# Patient Record
Sex: MALE | Race: OTHER | NOT HISPANIC OR LATINO | URBAN - METROPOLITAN AREA
[De-identification: names, ages, dates, MRNs, and addresses within clinical notes are randomized per-mention and may not be internally consistent; named-entity substitution may affect disease eponyms.]

---

## 2022-11-01 ENCOUNTER — EMERGENCY (EMERGENCY)
Facility: HOSPITAL | Age: 87
LOS: 1 days | Discharge: HOME | End: 2022-11-01
Attending: EMERGENCY MEDICINE | Admitting: EMERGENCY MEDICINE
Payer: COMMERCIAL

## 2022-11-01 VITALS
HEART RATE: 78 BPM | WEIGHT: 164.91 LBS | OXYGEN SATURATION: 98 % | TEMPERATURE: 98 F | HEIGHT: 69 IN | SYSTOLIC BLOOD PRESSURE: 202 MMHG | DIASTOLIC BLOOD PRESSURE: 94 MMHG | RESPIRATION RATE: 17 BRPM

## 2022-11-01 VITALS
DIASTOLIC BLOOD PRESSURE: 86 MMHG | HEART RATE: 70 BPM | SYSTOLIC BLOOD PRESSURE: 153 MMHG | OXYGEN SATURATION: 98 % | RESPIRATION RATE: 17 BRPM | TEMPERATURE: 97 F

## 2022-11-01 PROBLEM — Z00.00 ENCOUNTER FOR PREVENTIVE HEALTH EXAMINATION: Status: ACTIVE | Noted: 2022-11-01

## 2022-11-01 PROCEDURE — 93971 EXTREMITY STUDY: CPT

## 2022-11-01 PROCEDURE — 73610 X-RAY EXAM OF ANKLE: CPT | Mod: 26,LT

## 2022-11-01 PROCEDURE — 99284 EMERGENCY DEPT VISIT MOD MDM: CPT

## 2022-11-01 PROCEDURE — 93971 EXTREMITY STUDY: CPT | Mod: 26,LT

## 2022-11-01 PROCEDURE — 99284 EMERGENCY DEPT VISIT MOD MDM: CPT | Mod: 25

## 2022-11-01 PROCEDURE — 73610 X-RAY EXAM OF ANKLE: CPT

## 2022-11-01 RX ORDER — FUROSEMIDE 40 MG
20 TABLET ORAL ONCE
Refills: 0 | Status: COMPLETED | OUTPATIENT
Start: 2022-11-01 | End: 2022-11-01

## 2022-11-01 RX ORDER — METOPROLOL TARTRATE 50 MG
25 TABLET ORAL ONCE
Refills: 0 | Status: COMPLETED | OUTPATIENT
Start: 2022-11-01 | End: 2022-11-01

## 2022-11-01 RX ORDER — ACETAMINOPHEN 500 MG
650 TABLET ORAL ONCE
Refills: 0 | Status: COMPLETED | OUTPATIENT
Start: 2022-11-01 | End: 2022-11-01

## 2022-11-01 RX ADMIN — Medication 25 MILLIGRAM(S): at 22:15

## 2022-11-01 RX ADMIN — Medication 20 MILLIGRAM(S): at 22:15

## 2022-11-01 RX ADMIN — Medication 650 MILLIGRAM(S): at 22:15

## 2022-11-01 NOTE — ED PROVIDER NOTE - PSYCHIATRIC, MLM
Form received from: [employer]    Form requesting following info/need: STD    TAZ needed?: No    Location of form: [employer]'s desk    When completed the route for return: Fax [phone]     Alert and oriented to person, place, time/situation. normal mood and affect. no apparent risk to self or others.

## 2022-11-01 NOTE — ED PROVIDER NOTE - MUSCULOSKELETAL, MLM
LLE: mild nonpitting edema of ankle and prox foot. mild anterior ankle ttp.  no rash. no increased warmth. normal color. 2+ dp. FROM ankle/toes, knee/hip

## 2022-11-01 NOTE — ED ADULT NURSE NOTE - OBJECTIVE STATEMENT
Pt presents to the ED with c/o swelling to left ankle he noticed today. Denies any recent falls or trauma to leg. Denies cp/sob.

## 2022-11-01 NOTE — ED PROVIDER NOTE - OBJECTIVE STATEMENT
pt c/o L lower leg/ankle swelling for 1 days assoc c mild ankle achy pain. no recalled trauma. worse c walking. no fever. no cp/sob.

## 2022-11-01 NOTE — ED PROVIDER NOTE - PATIENT PORTAL LINK FT
You can access the FollowMyHealth Patient Portal offered by [facility] by registering at the following website: [email or web address]. By joining [employer]’s FollowMyHealth portal, you will also be able to view your health information using other applications (apps) compatible with our system.

## 2022-11-01 NOTE — ED PROVIDER NOTE - NSFOLLOWUPINSTRUCTIONS_ED_ALL_ED_FT
- take tylenol as needed for pain  - rest, elevated swollen leg    Follow Up in 1-3 Days with your own doctor or with  [address] [address]  Phone: [phone]        Leg Edema    WHAT YOU NEED TO KNOW:    Leg edema is swelling caused by fluid buildup. Your legs may swell if you sit or stand for long periods of time, are pregnant, or are injured. Swelling may also occur if you have heart failure or circulation problems. This means that your heart does not pump blood through your body as it should.  Lower Leg Edema         DISCHARGE INSTRUCTIONS:    Call your local emergency number (911 in the [place]) for any of the following:   •You cannot walk.      •You have chest pain or trouble breathing that is worse when you lie down.      •You suddenly feel lightheaded and have trouble breathing.      •You have new and sudden chest pain. You may have more pain when you take deep breaths or cough.      •You cough up blood.      Return to the emergency department if:   •You feel faint or confused.      •Your skin turns blue or gray.      •Your leg feels warm, tender, and painful. It may be swollen and red.      Call your doctor if:   •You have a fever or feel more tired than usual.      •The veins in your legs look larger than usual. They may look full or bulging.      •Your legs itch or feel heavy.      •You have red or white areas or sores on your legs. The skin may also appear dimpled or have indentations.      •You are gaining weight.      •You have trouble moving your ankles.      •The swelling does not go away, or other parts of your body swell.      •You have questions or concerns about your condition or care.      Self-care:   •Elevate your legs. Raise your legs above the level of your heart as often as you can. This will help decrease swelling and pain. Prop your legs on pillows or blankets to keep them elevated comfortably.  Elevate Leg           •Wear pressure stockings, if directed. These tight stockings put pressure on your legs to promote blood flow and prevent blood clots. Put them on before you get out of bed. Wear the stockings during the day. Do not wear them while you sleep.  Pressure Stockings            •Stay active. Do not stand or sit for long periods of time. Ask your healthcare provider about the best exercise plan for you.  Walking for Exercise           •Eat healthy foods. Healthy foods include fruits, vegetables, whole-grain breads, low-fat dairy products, beans, lean meats, and fish. Ask if you need to be on a special diet.  Healthy Foods           •Limit sodium (salt). Salt will make your body hold even more fluid. Your healthcare provider will tell you how many milligrams (mg) of salt you can have each day.             Follow up with your doctor as directed: Write down your questions so you remember to ask them during your visits.

## 2022-11-01 NOTE — ED ADULT TRIAGE NOTE - BMI (KG/M2)
Labor Analgesia    Date/Time: 5/22/2021 6:47 PM  Performed by: Malachi Lopez MD  Authorized by: Malachi Lopez MD       General Information and Staff    Start Time:   Anesthesiologist: Malachi Lopez MD  Performed by:   Anesthesiologist  Patient Loca
24.4

## 2022-11-01 NOTE — ED PROVIDER NOTE - CPE EDP HEME LYMPH NORM
"  Contact Dermatitis  Contact dermatitis is a skin rash caused by something that touches the skin and makes it irritated and inflamed. Your skin may be red, swollen, dry, and may be cracked. Blisters may form and ooze. The rash will itch.  Contact dermatitis can form on the face and neck, backs of hands, forearms, genitals, and lower legs.  People can get contact dermatitis from lots of sources. These include:  · Plants such as poison ivy, oak, or sumac  · Chemicals in hair dyes and rinses, soaps, solvents, waxes, fingernail polish, and deodorants   · Jewelry or watchbands made of nickel  Contact dermatitis is not passed from person to person.  Talk with your healthcare provider about what may have caused the rash. A type of allergy testing called "patch testing" may be used to discover what you are allergic to. You will need to avoid the source of your rash in the future to prevent it from coming back.  Treatment is done to relieve itching and prevent the rash from coming back. The rash should go away in a few days to a few weeks.  Home care  Your healthcare provider may prescribe medicine to relieve swelling and itching. Follow all instructions when using these medicines.  General care:  · Avoid anything that heats up your skin, such as hot showers or baths, or direct sunlight. This can make itching worse.  · Apply cold compresses to soothe your sores to help relieve your symptoms. Do this for 30 minutes 3 to 4 times a day. You can make a cold compress by soaking a cloth in cold water. Squeeze out excess water. You can add colloidal oatmeal to the water to help reduce itching. For severe itching in a small area, apply an ice pack wrapped in a thin towel. Do this for 20 minutes 3 to 4 times a day.  · You can also try wet dressings. One way to do this is to wear a wet piece of clothing under a dry one. Wear a damp shirt under a dry shirt if your upper body is affected. This can relieve itching and prevent you from " scratching the affected area.  · You can also help relieve large areas of itching by taking a lukewarm bath with colloidal oatmeal added to the water.  · Use hydrocortisone cream for redness and irritation, unless another medicine was prescribed. You can also use benzocaine anesthetic cream or spray. Calamine lotion can also relieve mild symptoms.  · Use oral diphenhydramine to help reduce itching. You can buy this antihistamine at drug and grocery stores. It can make you sleepy, so use lower doses during the daytime. Or you can use loratadine. This is an antihistamine that will not make you sleepy. Do not use diphenhydramine if you have glaucoma or have trouble urinating due to an enlarged prostate.  · If a plant causes your rash, make sure to wash your skin and the clothes you were wearing when you came into contact with the plant. This is to wash away the plant oils that gave you the rash and prevent more or worse symptoms.  · Stay away from the substance or object that causes your symptoms. If you cant avoid it, wear gloves or some other type of protection.  Follow-up care  Follow up with your healthcare provider, or as advised.  When to seek medical advice  Call your healthcare provider right away if any of these occur:  · Spreading of the rash to other parts of your body  · Severe swelling of your face, eyelids, mouth, throat or tongue  · Trouble urinating due to swelling in the genital area  · Fever of 100.4°F (38°C) or higher  · Redness or swelling that gets worse  · Pain that gets worse  · Foul-smelling fluid leaking from the skin  · Yellow-brown crusts on the open blisters  Date Last Reviewed: 9/1/2016  © 1804-0741 [employer]. [address] [place] [address], [place] [address]. All rights reserved. This information is not intended as a substitute for professional medical care. Always follow your healthcare professional's instructions.        Nonspecific Dermatitis  Dermatitis is a skin rash caused  by something that touches the skin and makes it irritated and inflamed.  Your skin may be red, swollen, dry, and may be cracked. Blisters may form and ooze. The rash will itch.  Dermatitis can form on the face and neck, backs of hands, forearms, genitals, and lower legs. Dermatitis is not passed from person to person.  Talk with your health care provider about what may have caused the rash. Common things that cause skin allergies are metal in jewelry, plants like poison ivy or poison oak, and certain skin care products. You will need to avoid the source of your rash in the future to prevent it from coming back. In some cases, the cause of the dermatitis may not be found.  Treatment is done to relieve itching and prevent the rash from coming back. The rash should go away in a few days to a few weeks.  Home care  The health care provider may prescribe medications to relieve swelling and itching. Follow all instructions when using these medications.  · Avoid anything that heats up your skin, such as hot showers or baths, or direct sunlight. This can make itching worse.  · Stay away from whatever you think caused the rash.  · Bathe in warm, not hot, water. Apply a moisturizing lotion after bathing to prevent dry skin.  · Avoid skin irritants such as wool or silk clothing, grease, oils, harsh soaps, and detergents.  · Apply cold compresses to soothe your sores to help relieve your symptoms. Do this for 30 minutes 3 to 4 times a day. You can make a cold compress by soaking a cloth in cold water. Squeeze out excess water. You can add colloidal oatmeal to the water to help reduce itching. For severe itching in a small area, apply an ice pack wrapped in a thin towel. Do this for 20 minutes 3 to 4 times a day.  · You can also help relieve large areas of itching by taking a lukewarm bath with colloidal oatmeal added to the water.  · Use hydrocortisone cream for redness and irritation, unless another medicine was prescribed.  You can also use benzocaine anesthetic cream or spray.  · Use oral diphenhydramine to help reduce itching. This is an antihistamine you can buy at drug and grocery stores. It can make you sleepy, so use lower doses during the daytime. Or you can use loratadine. This is an antihistamine that will not make you sleepy. Dont use diphenhydramine if you have glaucoma or have trouble urinating because of an enlarged prostate.  · Wash your hands or use an antibacterial gel often to prevent the spread of the rash.  Follow-up care  Follow up with your health care provider. Make an appointment with your health care provider if your symptoms do not get better in the next 1 to 2 weeks.  When to seek medical advice  Call your health care provider right away if any of these occur:  · Spreading of the rash to other parts of your body  · Severe swelling of your face, eyelids, mouth, throat or tongue  · Trouble urinating due to swelling in the genital area  · Fever of 100.4°F (38°C) or higher  · Redness or swelling that gets worse  · Pain that gets worse  · Foul-smelling fluid leaking from the skin  · Yellow-brown crusts on the open blisters  · Joint pain   Date Last Reviewed: 7/23/2014  © 3727-9097 [employer] [address] [place] [address], [place] [address]. All rights reserved. This information is not intended as a substitute for professional medical care. Always follow your healthcare professional's instructions.         - - -

## 2022-11-01 NOTE — ED PROVIDER NOTE - CLINICAL SUMMARY MEDICAL DECISION MAKING FREE TEXT BOX
patient pt c L ankle/foot pain/swelling 1 days. no cp/sob. no fever. check xr r/o bony injury/fx. check duplex r/o DVT. pt on lasix 20mg qd . will give additional dose for swelling and high bp at triage. no s/s hypertensive emergency. will reassess bp.     xr neg. sono shows NO dvt. bp improved. ?arthritis, mild sprain. tylenol for pain . f/u pmd

## 2022-11-02 ENCOUNTER — APPOINTMENT (OUTPATIENT)
Dept: GERIATRICS | Facility: CLINIC | Age: 87
End: 2022-11-02

## 2022-11-02 ENCOUNTER — NON-APPOINTMENT (OUTPATIENT)
Age: 87
End: 2022-11-02

## 2022-11-02 VITALS
DIASTOLIC BLOOD PRESSURE: 79 MMHG | SYSTOLIC BLOOD PRESSURE: 138 MMHG | OXYGEN SATURATION: 99 % | RESPIRATION RATE: 18 BRPM | TEMPERATURE: 98.2 F | HEIGHT: 69 IN | WEIGHT: 164 LBS | BODY MASS INDEX: 24.29 KG/M2 | HEART RATE: 77 BPM

## 2022-11-02 DIAGNOSIS — Z85.46 PERSONAL HISTORY OF MALIGNANT NEOPLASM OF PROSTATE: ICD-10-CM

## 2022-11-02 DIAGNOSIS — G47.9 SLEEP DISORDER, UNSPECIFIED: ICD-10-CM

## 2022-11-02 DIAGNOSIS — Z82.0 FAMILY HISTORY OF EPILEPSY AND OTHER DISEASES OF THE NERVOUS SYSTEM: ICD-10-CM

## 2022-11-02 DIAGNOSIS — R32 UNSPECIFIED URINARY INCONTINENCE: ICD-10-CM

## 2022-11-02 DIAGNOSIS — G25.0 ESSENTIAL TREMOR: ICD-10-CM

## 2022-11-02 DIAGNOSIS — I25.10 ATHEROSCLEROTIC HEART DISEASE OF NATIVE CORONARY ARTERY W/OUT ANGINA PECTORIS: ICD-10-CM

## 2022-11-02 DIAGNOSIS — I83.93 ASYMPTOMATIC VARICOSE VEINS OF BILATERAL LOWER EXTREMITIES: ICD-10-CM

## 2022-11-02 DIAGNOSIS — Z82.49 FAMILY HISTORY OF ISCHEMIC HEART DISEASE AND OTHER DISEASES OF THE CIRCULATORY SYSTEM: ICD-10-CM

## 2022-11-02 DIAGNOSIS — L03.90 CELLULITIS, UNSPECIFIED: ICD-10-CM

## 2022-11-02 DIAGNOSIS — Z95.1 PRESENCE OF AORTOCORONARY BYPASS GRAFT: ICD-10-CM

## 2022-11-02 DIAGNOSIS — M10.9 GOUT, UNSPECIFIED: ICD-10-CM

## 2022-11-02 DIAGNOSIS — Z95.0 PRESENCE OF CARDIAC PACEMAKER: ICD-10-CM

## 2022-11-02 PROCEDURE — 99205 OFFICE O/P NEW HI 60 MIN: CPT

## 2022-11-02 RX ORDER — CLOPIDOGREL BISULFATE 75 MG/1
75 TABLET, FILM COATED ORAL
Qty: 90 | Refills: 0 | Status: ACTIVE | COMMUNITY
Start: 2022-11-02

## 2022-11-02 RX ORDER — FUROSEMIDE 20 MG/1
20 TABLET ORAL
Qty: 90 | Refills: 2 | Status: ACTIVE | COMMUNITY
Start: 2022-11-02

## 2022-11-02 RX ORDER — COLCHICINE 0.6 MG/1
0.6 TABLET ORAL
Qty: 30 | Refills: 1 | Status: ACTIVE | COMMUNITY
Start: 2022-11-02 | End: 1900-01-01

## 2022-11-02 RX ORDER — MULTIVIT-MIN/FOLIC/VIT K/LYCOP 400-300MCG
1000 TABLET ORAL
Qty: 90 | Refills: 1 | Status: ACTIVE | COMMUNITY
Start: 2022-11-02

## 2022-11-02 RX ORDER — PRIMIDONE 50 MG/1
50 TABLET ORAL
Qty: 180 | Refills: 0 | Status: ACTIVE | COMMUNITY
Start: 2022-11-02

## 2022-11-02 RX ORDER — ELECTROLYTES/DEXTROSE
SOLUTION, ORAL ORAL
Qty: 30 | Refills: 6 | Status: ACTIVE | COMMUNITY
Start: 2022-11-02

## 2022-11-02 RX ORDER — CEPHALEXIN 500 MG/1
500 CAPSULE ORAL
Qty: 10 | Refills: 0 | Status: ACTIVE | COMMUNITY
Start: 2022-11-02 | End: 1900-01-01

## 2022-11-02 RX ORDER — DOXAZOSIN 1 MG/1
1 TABLET ORAL
Refills: 0 | Status: ACTIVE | COMMUNITY
Start: 2022-11-02

## 2022-11-02 RX ORDER — ATORVASTATIN CALCIUM 40 MG/1
40 TABLET, FILM COATED ORAL
Qty: 90 | Refills: 3 | Status: ACTIVE | COMMUNITY
Start: 2022-11-02

## 2022-11-08 NOTE — CHART NOTE - NSCHARTNOTEFT_GEN_A_CORE
SW called pt to discuss and assist with follow up care. Pt is a 89 y/o male presented to ED for swelling of legs.  As per [facility], pt has a scheduled primary care appt with Katerina Marie on 11/2/22.
As per [facility], patient has scheduled NPA with Geriatrics Dr Katerina Marc on 11/2/22.

## 2022-11-09 RX ORDER — METOPROLOL TARTRATE 25 MG/1
25 TABLET, FILM COATED ORAL TWICE DAILY
Qty: 60 | Refills: 6 | Status: ACTIVE | COMMUNITY
Start: 2022-11-02 | End: 1900-01-01

## 2022-11-09 RX ORDER — POTASSIUM CHLORIDE 750 MG/1
10 TABLET, FILM COATED, EXTENDED RELEASE ORAL
Qty: 30 | Refills: 4 | Status: ACTIVE | COMMUNITY
Start: 2022-11-02 | End: 1900-01-01

## 2022-11-10 ENCOUNTER — OUTPATIENT (OUTPATIENT)
Dept: OUTPATIENT SERVICES | Facility: HOSPITAL | Age: 87
LOS: 1 days | End: 2022-11-10
Payer: COMMERCIAL

## 2022-11-10 ENCOUNTER — APPOINTMENT (OUTPATIENT)
Dept: CT IMAGING | Facility: CLINIC | Age: 87
End: 2022-11-10

## 2022-11-10 DIAGNOSIS — S85.182A: ICD-10-CM

## 2022-11-10 PROBLEM — I10 ESSENTIAL (PRIMARY) HYPERTENSION: Chronic | Status: ACTIVE | Noted: 2022-11-03

## 2022-11-10 PROBLEM — Z95.0 PRESENCE OF CARDIAC PACEMAKER: Chronic | Status: ACTIVE | Noted: 2022-11-03

## 2022-11-10 PROBLEM — I25.10 ATHEROSCLEROTIC HEART DISEASE OF NATIVE CORONARY ARTERY WITHOUT ANGINA PECTORIS: Chronic | Status: ACTIVE | Noted: 2022-11-03

## 2022-11-10 PROCEDURE — 73700 CT LOWER EXTREMITY W/O DYE: CPT | Mod: 26,LT

## 2022-11-10 PROCEDURE — 73700 CT LOWER EXTREMITY W/O DYE: CPT

## 2022-11-21 DIAGNOSIS — Z23 ENCOUNTER FOR IMMUNIZATION: ICD-10-CM

## 2022-11-21 NOTE — REVIEW OF SYSTEMS
[Lower Ext Edema] : lower extremity edema [Incontinence] : incontinence [As Noted in HPI] : as noted in HPI [Fever] : no fever [Eyesight Problems] : no eyesight problems [Loss Of Hearing] : no hearing loss [Chest Pain] : no chest pain [Palpitations] : no palpitations [Cough] : no cough [Constipation] : no constipation [Dysuria] : no dysuria

## 2022-11-21 NOTE — HISTORY OF PRESENT ILLNESS
[No falls in past year] : Patient reported no falls in the past year [Completely Independent] : Completely independent. [0] : 2) Feeling down, depressed, or hopeless: Not at all (0) [PHQ-2 Negative - No further assessment needed] : PHQ-2 Negative - No further assessment needed [With Patient/Caregiver] : , with patient/caregiver [Designated Healthcare Proxy] : Designated healthcare proxy [Name: ___] : Health Care Proxy's Name: [unfilled]  [Relationship: ___] : Relationship: [unfilled] [FreeTextEntry1] : 89yoM with Essential Tremor, CAD s/p CABG, PPM, HLD, hx of prostate cancer, gout, seen as new visit.\par dtr Katie\par went to ER 11/1/22 with lower ext edema has Doppler that was negative for DVT.\par \par was living in [place], but moved to [place] to be with his daugther\par \par CAD s/p triple bybass CABG\par PPM- \par PCI x 4\par TAVR 2020\par Dr. Joon Becker [facility] [place]-\par \par left lower leg edema on and off for a while\par hx of trauma with leg wound from  that has now healed\par DVT negative with doppler 11/1/22\par now with redness and pain\par very dry skin\par \par CKD\par pHTN\par preDm2\par \par Functional\par no recent falls\par balance is "fairly good"\par walks independently\par slow and unsteady\par \par no numbness\par \par Gout: hasn't had recent attack\par hx of in toes\par colchicine- PRN- needs refill\par \par sleep disturbance for 3 years\par denies nocturia\par melatonin sporadically\par \par hx of prostate cancer s/p prostate surgery\par no adjuvant treatment\par with chronic incontinence\par no associated skin rashes\par \par lung cancer s/p removal of "cyst" [facility]- will need records\par denies SOB or cough\par COPD on imaging upon review of old records, asytmpomatic\par \par Dr. 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

## 2022-11-21 NOTE — REASON FOR VISIT
[Initial Evaluation] : an initial evaluation [Family Member] : family member [FreeTextEntry1] : swollen left leg, establish care

## 2022-11-21 NOTE — PHYSICAL EXAM
[Normal] : no respiratory distress, no accessory muscle use, normal respiratory rhythm and effort, lungs were clear to auscultation bilaterally [Normal S1, S2] : normal S1 and S2 [Heart Rate And Rhythm] : heart rate was normal and rhythm regular [Bowel Sounds] : normal bowel sounds [Abdomen Tenderness] : non-tender [Abdomen Soft] : soft [Cervical Lymph Nodes Enlarged Posterior Bilaterally] : posterior cervical [Supraclavicular Lymph Nodes Enlarged Bilaterally] : supraclavicular [Cervical Lymph Nodes Enlarged Anterior Bilaterally] : anterior cervical, supraclavicular [No Clubbing, Cyanosis] : no clubbing or cyanosis of the fingernails [No Focal Deficits] : no focal deficits [Normal Affect] : the affect was normal [Normal Mood] : the mood was normal [de-identified] : midline scar [de-identified] : edema of left lower leg with redness and tenderness, varicose veins, reduced pedal pulses [de-identified] : slow unsteady gait [de-identified] : intention tremor

## 2022-11-21 NOTE — ASSESSMENT
[FreeTextEntry1] : cellulitis: keflex x 5 days, closely monitor, return if not improving\par daily lotion, monitor for any wounds/skin breaks\par vascular referral -reduced pedal pulses\par doppler negative for dvt 11/1/22\par \par cad/PPM/ cabg: referral to cardiology to establish care as recently moved up from [place]\par pending records from previous cardiologist\par ++ s/p TAVR 12/21/20\par Pafib \par CAD s/p PCI LM, LCx 12/7/2020\par cardiac cath 10/2020: patent stens, LM 90%, LCx 90%, RCA 70-80%\par CABG 1/1990:  LIMA-LAD, SVG-OM, SVG-RCA\par PHTN\par has valve replacement, but he doesn't know details\par -check labwork\par -c/w current medications\par \par gout: stable\par c/w colchicine prn flare\par \par hx of prostate cancer s/p prostatectomy 1998\par with urinary incontinence\par \par lobectomy 6/2016 [facility]\par Essential tremor: stable c/w primidone bid\par \par MOCA 11/2/22: 21/30\par difficulty with delayed recall- but able to recall with prompting\par \par next visit: f/u HCP/ACP,  referral , f/u records from [place] doctors- including immunizations \par \par \par

## 2023-02-08 ENCOUNTER — APPOINTMENT (OUTPATIENT)
Dept: GERIATRICS | Facility: CLINIC | Age: 88
End: 2023-02-08

## 2024-01-01 ENCOUNTER — INPATIENT (INPATIENT)
Facility: HOSPITAL | Age: 89
LOS: 0 days | DRG: 684 | End: 2024-09-25
Attending: FAMILY MEDICINE | Admitting: FAMILY MEDICINE
Payer: MEDICARE

## 2024-01-01 VITALS
TEMPERATURE: 97 F | DIASTOLIC BLOOD PRESSURE: 50 MMHG | WEIGHT: 166.89 LBS | OXYGEN SATURATION: 98 % | HEART RATE: 99 BPM | SYSTOLIC BLOOD PRESSURE: 111 MMHG | RESPIRATION RATE: 22 BRPM

## 2024-01-01 VITALS
HEIGHT: 69 IN | OXYGEN SATURATION: 97 % | DIASTOLIC BLOOD PRESSURE: 46 MMHG | SYSTOLIC BLOOD PRESSURE: 98 MMHG | HEART RATE: 70 BPM | TEMPERATURE: 98 F | WEIGHT: 199.96 LBS | RESPIRATION RATE: 22 BRPM

## 2024-01-01 DIAGNOSIS — N17.9 ACUTE KIDNEY FAILURE, UNSPECIFIED: ICD-10-CM

## 2024-01-01 LAB
ALBUMIN SERPL ELPH-MCNC: 2.5 G/DL — LOW (ref 3.3–5)
ALP SERPL-CCNC: 187 U/L — HIGH (ref 40–120)
ALT FLD-CCNC: 73 U/L — HIGH (ref 10–45)
ANION GAP SERPL CALC-SCNC: 19 MMOL/L — HIGH (ref 5–17)
ANION GAP SERPL CALC-SCNC: 21 MMOL/L — HIGH (ref 5–17)
ANISOCYTOSIS BLD QL: SLIGHT — SIGNIFICANT CHANGE UP
APTT BLD: 36.5 SEC — HIGH (ref 24.5–35.6)
AST SERPL-CCNC: 131 U/L — HIGH (ref 10–40)
BASOPHILS # BLD AUTO: 0 K/UL — SIGNIFICANT CHANGE UP (ref 0–0.2)
BASOPHILS NFR BLD AUTO: 0 % — SIGNIFICANT CHANGE UP (ref 0–2)
BILIRUB SERPL-MCNC: 2.8 MG/DL — HIGH (ref 0.2–1.2)
BUN SERPL-MCNC: 117 MG/DL — HIGH (ref 7–23)
BUN SERPL-MCNC: 130 MG/DL — HIGH (ref 7–23)
BURR CELLS BLD QL SMEAR: PRESENT — SIGNIFICANT CHANGE UP
CALCIUM SERPL-MCNC: 7.4 MG/DL — LOW (ref 8.4–10.5)
CALCIUM SERPL-MCNC: 9 MG/DL — SIGNIFICANT CHANGE UP (ref 8.4–10.5)
CHLORIDE SERPL-SCNC: 102 MMOL/L — SIGNIFICANT CHANGE UP (ref 96–108)
CHLORIDE SERPL-SCNC: 92 MMOL/L — LOW (ref 96–108)
CO2 SERPL-SCNC: 16 MMOL/L — LOW (ref 22–31)
CO2 SERPL-SCNC: 18 MMOL/L — LOW (ref 22–31)
CREAT SERPL-MCNC: 4.41 MG/DL — HIGH (ref 0.5–1.3)
CREAT SERPL-MCNC: 4.94 MG/DL — HIGH (ref 0.5–1.3)
E FAECALIS DNA BLD POS QL NAA+NON-PROBE: SIGNIFICANT CHANGE UP
EGFR: 10 ML/MIN/1.73M2 — LOW
EGFR: 11 ML/MIN/1.73M2 — LOW
EOSINOPHIL # BLD AUTO: 0 K/UL — SIGNIFICANT CHANGE UP (ref 0–0.5)
EOSINOPHIL NFR BLD AUTO: 0 % — SIGNIFICANT CHANGE UP (ref 0–6)
FLUAV AG NPH QL: SIGNIFICANT CHANGE UP
FLUBV AG NPH QL: SIGNIFICANT CHANGE UP
GLUCOSE BLDC GLUCOMTR-MCNC: 189 MG/DL — HIGH (ref 70–99)
GLUCOSE BLDC GLUCOMTR-MCNC: 191 MG/DL — HIGH (ref 70–99)
GLUCOSE SERPL-MCNC: 114 MG/DL — HIGH (ref 70–99)
GLUCOSE SERPL-MCNC: 158 MG/DL — HIGH (ref 70–99)
GRAM STN FLD: ABNORMAL
HCT VFR BLD CALC: 29.2 % — LOW (ref 39–50)
HGB BLD-MCNC: 9.8 G/DL — LOW (ref 13–17)
HYPOCHROMIA BLD QL: SLIGHT — SIGNIFICANT CHANGE UP
INR BLD: 3.31 RATIO — HIGH (ref 0.85–1.16)
LACTATE SERPL-SCNC: 4.3 MMOL/L — CRITICAL HIGH (ref 0.7–2)
LACTATE SERPL-SCNC: 4.5 MMOL/L — CRITICAL HIGH (ref 0.7–2)
LYMPHOCYTES # BLD AUTO: 0.18 K/UL — LOW (ref 1–3.3)
LYMPHOCYTES # BLD AUTO: 1 % — LOW (ref 13–44)
MANUAL SMEAR VERIFICATION: SIGNIFICANT CHANGE UP
MCHC RBC-ENTMCNC: 30.2 PG — SIGNIFICANT CHANGE UP (ref 27–34)
MCHC RBC-ENTMCNC: 33.6 GM/DL — SIGNIFICANT CHANGE UP (ref 32–36)
MCV RBC AUTO: 90.1 FL — SIGNIFICANT CHANGE UP (ref 80–100)
METHOD TYPE: SIGNIFICANT CHANGE UP
MONOCYTES # BLD AUTO: 0.7 K/UL — SIGNIFICANT CHANGE UP (ref 0–0.9)
MONOCYTES NFR BLD AUTO: 4 % — SIGNIFICANT CHANGE UP (ref 2–14)
NEUTROPHILS # BLD AUTO: 16.69 K/UL — HIGH (ref 1.8–7.4)
NEUTROPHILS NFR BLD AUTO: 93 % — HIGH (ref 43–77)
NEUTS BAND # BLD: 2 % — SIGNIFICANT CHANGE UP (ref 0–8)
NRBC # BLD: 0 /100 WBCS — SIGNIFICANT CHANGE UP (ref 0–0)
PLAT MORPH BLD: NORMAL — SIGNIFICANT CHANGE UP
PLATELET # BLD AUTO: 209 K/UL — SIGNIFICANT CHANGE UP (ref 150–400)
POTASSIUM SERPL-MCNC: 4.9 MMOL/L — SIGNIFICANT CHANGE UP (ref 3.5–5.3)
POTASSIUM SERPL-MCNC: 7 MMOL/L — CRITICAL HIGH (ref 3.5–5.3)
POTASSIUM SERPL-SCNC: 4.9 MMOL/L — SIGNIFICANT CHANGE UP (ref 3.5–5.3)
POTASSIUM SERPL-SCNC: 7 MMOL/L — CRITICAL HIGH (ref 3.5–5.3)
PROT SERPL-MCNC: 6.6 G/DL — SIGNIFICANT CHANGE UP (ref 6–8.3)
PROTHROM AB SERPL-ACNC: 38.6 SEC — HIGH (ref 9.9–13.4)
RBC # BLD: 3.24 M/UL — LOW (ref 4.2–5.8)
RBC # FLD: 19.9 % — HIGH (ref 10.3–14.5)
RBC BLD AUTO: ABNORMAL
RSV RNA NPH QL NAA+NON-PROBE: SIGNIFICANT CHANGE UP
SARS-COV-2 RNA SPEC QL NAA+PROBE: SIGNIFICANT CHANGE UP
SODIUM SERPL-SCNC: 131 MMOL/L — LOW (ref 135–145)
SODIUM SERPL-SCNC: 137 MMOL/L — SIGNIFICANT CHANGE UP (ref 135–145)
SPECIMEN SOURCE: SIGNIFICANT CHANGE UP
SPECIMEN SOURCE: SIGNIFICANT CHANGE UP
WBC # BLD: 17.57 K/UL — HIGH (ref 3.8–10.5)
WBC # FLD AUTO: 17.57 K/UL — HIGH (ref 3.8–10.5)

## 2024-01-01 PROCEDURE — 93010 ELECTROCARDIOGRAM REPORT: CPT

## 2024-01-01 PROCEDURE — 99235 HOSP IP/OBS SAME DATE MOD 70: CPT

## 2024-01-01 PROCEDURE — 71250 CT THORAX DX C-: CPT | Mod: 26,MC

## 2024-01-01 PROCEDURE — 74176 CT ABD & PELVIS W/O CONTRAST: CPT | Mod: 26,MC

## 2024-01-01 PROCEDURE — 99291 CRITICAL CARE FIRST HOUR: CPT

## 2024-01-01 PROCEDURE — 93306 TTE W/DOPPLER COMPLETE: CPT | Mod: 26

## 2024-01-01 PROCEDURE — 76705 ECHO EXAM OF ABDOMEN: CPT | Mod: 26

## 2024-01-01 PROCEDURE — 71045 X-RAY EXAM CHEST 1 VIEW: CPT | Mod: 26

## 2024-01-01 PROCEDURE — 99222 1ST HOSP IP/OBS MODERATE 55: CPT

## 2024-01-01 RX ORDER — SODIUM CHLORIDE IRRIG SOLUTION 0.9 %
1000 SOLUTION, IRRIGATION IRRIGATION
Refills: 0 | Status: DISCONTINUED | OUTPATIENT
Start: 2024-01-01 | End: 2024-01-01

## 2024-01-01 RX ORDER — PETROLATUM,WHITE
1 JELLY (GRAM) TOPICAL DAILY
Refills: 0 | Status: DISCONTINUED | OUTPATIENT
Start: 2024-01-01 | End: 2024-01-01

## 2024-01-01 RX ORDER — METOLAZONE 10 MG/1
1 TABLET ORAL
Refills: 0 | DISCHARGE

## 2024-01-01 RX ORDER — MULTI VITAMIN/MINERAL SUPPLEMENT WITH ASCORBIC ACID, NIACIN, PYRIDOXINE, PANTOTHENIC ACID, FOLIC ACID, RIBOFLAVIN, THIAMIN, BIOTIN, COBALAMIN AND ZINC. 60; 20; 12.5; 10; 10; 1.7; 1.5; 1; .3; .006 MG/1; MG/1; MG/1; MG/1; MG/1; MG/1; MG/1; MG/1; MG/1; MG/1
1 TABLET, COATED ORAL DAILY
Refills: 0 | Status: DISCONTINUED | OUTPATIENT
Start: 2024-01-01 | End: 2024-01-01

## 2024-01-01 RX ORDER — AZITHROMYCIN 250 MG/1
500 TABLET, FILM COATED ORAL ONCE
Refills: 0 | Status: COMPLETED | OUTPATIENT
Start: 2024-01-01 | End: 2024-01-01

## 2024-01-01 RX ORDER — ATORVASTATIN CALCIUM 10 MG/1
40 TABLET, FILM COATED ORAL AT BEDTIME
Refills: 0 | Status: DISCONTINUED | OUTPATIENT
Start: 2024-01-01 | End: 2024-01-01

## 2024-01-01 RX ORDER — ALCOHOL ANTISEPTIC PADS
25 PADS, MEDICATED (EA) TOPICAL ONCE
Refills: 0 | Status: DISCONTINUED | OUTPATIENT
Start: 2024-01-01 | End: 2024-01-01

## 2024-01-01 RX ORDER — ALCOHOL ANTISEPTIC PADS
15 PADS, MEDICATED (EA) TOPICAL ONCE
Refills: 0 | Status: DISCONTINUED | OUTPATIENT
Start: 2024-01-01 | End: 2024-01-01

## 2024-01-01 RX ORDER — INSULIN REGULAR, HUMAN 100/ML
7 VIAL (ML) INJECTION ONCE
Refills: 0 | Status: COMPLETED | OUTPATIENT
Start: 2024-01-01 | End: 2024-01-01

## 2024-01-01 RX ORDER — 5-HYDROXYTRYPTOPHAN (5-HTP) 100 MG
3 TABLET,DISINTEGRATING ORAL AT BEDTIME
Refills: 0 | Status: DISCONTINUED | OUTPATIENT
Start: 2024-01-01 | End: 2024-01-01

## 2024-01-01 RX ORDER — LATANOPROST 50 UG/ML
1 SOLUTION OPHTHALMIC AT BEDTIME
Refills: 0 | Status: DISCONTINUED | OUTPATIENT
Start: 2024-01-01 | End: 2024-01-01

## 2024-01-01 RX ORDER — SODIUM CHLORIDE 0.9 % (FLUSH) 0.9 %
2300 SYRINGE (ML) INJECTION ONCE
Refills: 0 | Status: COMPLETED | OUTPATIENT
Start: 2024-01-01 | End: 2024-01-01

## 2024-01-01 RX ORDER — MAG HYDROX/ALUMINUM HYD/SIMETH 200-200-20
30 SUSPENSION, ORAL (FINAL DOSE FORM) ORAL EVERY 4 HOURS
Refills: 0 | Status: DISCONTINUED | OUTPATIENT
Start: 2024-01-01 | End: 2024-01-01

## 2024-01-01 RX ORDER — ACETAMINOPHEN 325 MG
650 TABLET ORAL EVERY 6 HOURS
Refills: 0 | Status: DISCONTINUED | OUTPATIENT
Start: 2024-01-01 | End: 2024-01-01

## 2024-01-01 RX ORDER — GLUCAGON INJECTION, SOLUTION 0.5 MG/.1ML
1 INJECTION, SOLUTION SUBCUTANEOUS ONCE
Refills: 0 | Status: DISCONTINUED | OUTPATIENT
Start: 2024-01-01 | End: 2024-01-01

## 2024-01-01 RX ORDER — ONDANSETRON HCL/PF 4 MG/2 ML
4 VIAL (ML) INJECTION EVERY 8 HOURS
Refills: 0 | Status: DISCONTINUED | OUTPATIENT
Start: 2024-01-01 | End: 2024-01-01

## 2024-01-01 RX ORDER — ALCOHOL ANTISEPTIC PADS
12.5 PADS, MEDICATED (EA) TOPICAL ONCE
Refills: 0 | Status: DISCONTINUED | OUTPATIENT
Start: 2024-01-01 | End: 2024-01-01

## 2024-01-01 RX ORDER — SODIUM CHLORIDE 0.9 % (FLUSH) 0.9 %
500 SYRINGE (ML) INJECTION ONCE
Refills: 0 | Status: COMPLETED | OUTPATIENT
Start: 2024-01-01 | End: 2024-01-01

## 2024-01-01 RX ORDER — INSULIN LISPRO 100/ML
VIAL (ML) SUBCUTANEOUS
Refills: 0 | Status: DISCONTINUED | OUTPATIENT
Start: 2024-01-01 | End: 2024-01-01

## 2024-01-01 RX ORDER — PIPERACILLIN SODIUM AND TAZOBACTAM SODIUM 12; 1.5 G/60ML; G/60ML
3.38 INJECTION, POWDER, LYOPHILIZED, FOR SOLUTION INTRAVENOUS EVERY 12 HOURS
Refills: 0 | Status: DISCONTINUED | OUTPATIENT
Start: 2024-01-01 | End: 2024-01-01

## 2024-01-01 RX ORDER — SPIRONOLACTONE 100 MG/1
1 TABLET, FILM COATED ORAL
Refills: 0 | DISCHARGE

## 2024-01-01 RX ORDER — SODIUM ZIRCONIUM CYCLOSILICATE 10 G/10G
10 POWDER, FOR SUSPENSION ORAL ONCE
Refills: 0 | Status: COMPLETED | OUTPATIENT
Start: 2024-01-01 | End: 2024-01-01

## 2024-01-01 RX ORDER — HALOPERIDOL LACTATE 2 MG/ML
4 CONCENTRATE, ORAL ORAL ONCE
Refills: 0 | Status: COMPLETED | OUTPATIENT
Start: 2024-01-01 | End: 2024-01-01

## 2024-01-01 RX ORDER — METOPROLOL TARTRATE 50 MG
25 TABLET ORAL DAILY
Refills: 0 | Status: DISCONTINUED | OUTPATIENT
Start: 2024-01-01 | End: 2024-01-01

## 2024-01-01 RX ORDER — ALCOHOL ANTISEPTIC PADS
50 PADS, MEDICATED (EA) TOPICAL ONCE
Refills: 0 | Status: COMPLETED | OUTPATIENT
Start: 2024-01-01 | End: 2024-01-01

## 2024-01-01 RX ORDER — INSULIN LISPRO 100/ML
VIAL (ML) SUBCUTANEOUS AT BEDTIME
Refills: 0 | Status: DISCONTINUED | OUTPATIENT
Start: 2024-01-01 | End: 2024-01-01

## 2024-01-01 RX ORDER — DOXAZOSIN 2 MG/1
2 TABLET ORAL DAILY
Refills: 0 | Status: DISCONTINUED | OUTPATIENT
Start: 2024-01-01 | End: 2024-01-01

## 2024-01-01 RX ORDER — SODIUM ZIRCONIUM CYCLOSILICATE 10 G/10G
10 POWDER, FOR SUSPENSION ORAL THREE TIMES A DAY
Refills: 0 | Status: DISCONTINUED | OUTPATIENT
Start: 2024-01-01 | End: 2024-01-01

## 2024-01-01 RX ORDER — APIXABAN 5 MG/1
2.5 TABLET, FILM COATED ORAL
Refills: 0 | Status: DISCONTINUED | OUTPATIENT
Start: 2024-01-01 | End: 2024-01-01

## 2024-01-01 RX ORDER — SODIUM CHLORIDE 0.9 % (FLUSH) 0.9 %
1000 SYRINGE (ML) INJECTION ONCE
Refills: 0 | Status: DISCONTINUED | OUTPATIENT
Start: 2024-01-01 | End: 2024-01-01

## 2024-01-01 RX ORDER — PEDIATRIC MULTIVIT 61/D3/VIT K 1500-800
1 CAPSULE ORAL DAILY
Refills: 0 | Status: DISCONTINUED | OUTPATIENT
Start: 2024-01-01 | End: 2024-01-01

## 2024-01-01 RX ORDER — FERROUS SULFATE 325(65) MG
325 TABLET ORAL DAILY
Refills: 0 | Status: DISCONTINUED | OUTPATIENT
Start: 2024-01-01 | End: 2024-01-01

## 2024-01-01 RX ORDER — CEFTRIAXONE SODIUM 1 G
1000 VIAL (EA) INJECTION ONCE
Refills: 0 | Status: COMPLETED | OUTPATIENT
Start: 2024-01-01 | End: 2024-01-01

## 2024-01-01 RX ADMIN — PIPERACILLIN SODIUM AND TAZOBACTAM SODIUM 25 GRAM(S): 12; 1.5 INJECTION, POWDER, LYOPHILIZED, FOR SOLUTION INTRAVENOUS at 18:19

## 2024-01-01 RX ADMIN — Medication 1000 MILLILITER(S): at 13:16

## 2024-01-01 RX ADMIN — Medication 1000 MILLILITER(S): at 10:14

## 2024-01-01 RX ADMIN — SODIUM ZIRCONIUM CYCLOSILICATE 10 GRAM(S): 10 POWDER, FOR SUSPENSION ORAL at 10:32

## 2024-01-01 RX ADMIN — Medication 2300 MILLILITER(S): at 10:31

## 2024-01-01 RX ADMIN — AZITHROMYCIN 255 MILLIGRAM(S): 250 TABLET, FILM COATED ORAL at 10:30

## 2024-01-01 RX ADMIN — Medication 1: at 18:31

## 2024-01-01 RX ADMIN — Medication 4 MILLIGRAM(S): at 15:36

## 2024-01-01 RX ADMIN — Medication 7 UNIT(S): at 10:31

## 2024-01-01 RX ADMIN — AZITHROMYCIN 500 MILLIGRAM(S): 250 TABLET, FILM COATED ORAL at 11:30

## 2024-01-01 RX ADMIN — Medication 50 MILLILITER(S): at 14:08

## 2024-01-01 RX ADMIN — Medication 2300 MILLILITER(S): at 12:00

## 2024-01-01 RX ADMIN — Medication 500 MILLILITER(S): at 12:00

## 2024-01-01 RX ADMIN — Medication 100 MILLIGRAM(S): at 10:14

## 2024-01-01 RX ADMIN — Medication 1000 MILLIGRAM(S): at 10:45

## 2024-01-01 RX ADMIN — Medication 50 MILLILITER(S): at 10:31

## 2024-01-01 RX ADMIN — Medication 1000 MILLILITER(S): at 09:25

## 2024-06-01 ENCOUNTER — INPATIENT (INPATIENT)
Facility: HOSPITAL | Age: 89
LOS: 5 days | Discharge: OTHER INSTITUTION | DRG: 291 | End: 2024-06-07
Attending: STUDENT IN AN ORGANIZED HEALTH CARE EDUCATION/TRAINING PROGRAM | Admitting: STUDENT IN AN ORGANIZED HEALTH CARE EDUCATION/TRAINING PROGRAM
Payer: MEDICARE

## 2024-06-01 VITALS
OXYGEN SATURATION: 98 % | HEIGHT: 69 IN | WEIGHT: 184.97 LBS | HEART RATE: 80 BPM | SYSTOLIC BLOOD PRESSURE: 149 MMHG | TEMPERATURE: 98 F | DIASTOLIC BLOOD PRESSURE: 79 MMHG | RESPIRATION RATE: 18 BRPM

## 2024-06-01 DIAGNOSIS — I50.23 ACUTE ON CHRONIC SYSTOLIC (CONGESTIVE) HEART FAILURE: ICD-10-CM

## 2024-06-01 DIAGNOSIS — R79.89 OTHER SPECIFIED ABNORMAL FINDINGS OF BLOOD CHEMISTRY: ICD-10-CM

## 2024-06-01 LAB
ALBUMIN SERPL ELPH-MCNC: 3 G/DL — LOW (ref 3.3–5)
ALP SERPL-CCNC: 173 U/L — HIGH (ref 40–120)
ALT FLD-CCNC: 19 U/L — SIGNIFICANT CHANGE UP (ref 10–45)
ANION GAP SERPL CALC-SCNC: 10 MMOL/L — SIGNIFICANT CHANGE UP (ref 5–17)
APTT BLD: 27.1 SEC — SIGNIFICANT CHANGE UP (ref 24.5–35.6)
AST SERPL-CCNC: 21 U/L — SIGNIFICANT CHANGE UP (ref 10–40)
BASOPHILS # BLD AUTO: 0.04 K/UL — SIGNIFICANT CHANGE UP (ref 0–0.2)
BASOPHILS NFR BLD AUTO: 0.7 % — SIGNIFICANT CHANGE UP (ref 0–2)
BILIRUB SERPL-MCNC: 1.2 MG/DL — SIGNIFICANT CHANGE UP (ref 0.2–1.2)
BUN SERPL-MCNC: 42 MG/DL — HIGH (ref 7–23)
CALCIUM SERPL-MCNC: 8.7 MG/DL — SIGNIFICANT CHANGE UP (ref 8.4–10.5)
CHLORIDE SERPL-SCNC: 107 MMOL/L — SIGNIFICANT CHANGE UP (ref 96–108)
CO2 SERPL-SCNC: 23 MMOL/L — SIGNIFICANT CHANGE UP (ref 22–31)
CREAT SERPL-MCNC: 1.69 MG/DL — HIGH (ref 0.5–1.3)
D DIMER BLD IA.RAPID-MCNC: 686 NG/ML DDU — HIGH
EGFR: 36 ML/MIN/1.73M2 — LOW
EOSINOPHIL # BLD AUTO: 0.18 K/UL — SIGNIFICANT CHANGE UP (ref 0–0.5)
EOSINOPHIL NFR BLD AUTO: 2.9 % — SIGNIFICANT CHANGE UP (ref 0–6)
GLUCOSE BLDC GLUCOMTR-MCNC: 191 MG/DL — HIGH (ref 70–99)
GLUCOSE BLDC GLUCOMTR-MCNC: 200 MG/DL — HIGH (ref 70–99)
GLUCOSE SERPL-MCNC: 184 MG/DL — HIGH (ref 70–99)
HCT VFR BLD CALC: 34 % — LOW (ref 39–50)
HGB BLD-MCNC: 11 G/DL — LOW (ref 13–17)
IMM GRANULOCYTES NFR BLD AUTO: 0.2 % — SIGNIFICANT CHANGE UP (ref 0–0.9)
INR BLD: 1.39 RATIO — HIGH (ref 0.85–1.18)
LIDOCAIN IGE QN: 110 U/L — HIGH (ref 16–77)
LYMPHOCYTES # BLD AUTO: 0.63 K/UL — LOW (ref 1–3.3)
LYMPHOCYTES # BLD AUTO: 10.3 % — LOW (ref 13–44)
MCHC RBC-ENTMCNC: 24.6 PG — LOW (ref 27–34)
MCHC RBC-ENTMCNC: 32.4 GM/DL — SIGNIFICANT CHANGE UP (ref 32–36)
MCV RBC AUTO: 76.1 FL — LOW (ref 80–100)
MONOCYTES # BLD AUTO: 0.73 K/UL — SIGNIFICANT CHANGE UP (ref 0–0.9)
MONOCYTES NFR BLD AUTO: 11.9 % — SIGNIFICANT CHANGE UP (ref 2–14)
NEUTROPHILS # BLD AUTO: 4.52 K/UL — SIGNIFICANT CHANGE UP (ref 1.8–7.4)
NEUTROPHILS NFR BLD AUTO: 74 % — SIGNIFICANT CHANGE UP (ref 43–77)
NRBC # BLD: 0 /100 WBCS — SIGNIFICANT CHANGE UP (ref 0–0)
NT-PROBNP SERPL-SCNC: 3408 PG/ML — HIGH (ref 0–300)
PLATELET # BLD AUTO: 147 K/UL — LOW (ref 150–400)
POTASSIUM SERPL-MCNC: 4.4 MMOL/L — SIGNIFICANT CHANGE UP (ref 3.5–5.3)
POTASSIUM SERPL-SCNC: 4.4 MMOL/L — SIGNIFICANT CHANGE UP (ref 3.5–5.3)
PROT SERPL-MCNC: 7.2 G/DL — SIGNIFICANT CHANGE UP (ref 6–8.3)
PROTHROM AB SERPL-ACNC: 15.7 SEC — HIGH (ref 9.5–13)
RAPID RVP RESULT: SIGNIFICANT CHANGE UP
RBC # BLD: 4.47 M/UL — SIGNIFICANT CHANGE UP (ref 4.2–5.8)
RBC # FLD: 16.5 % — HIGH (ref 10.3–14.5)
SARS-COV-2 RNA SPEC QL NAA+PROBE: SIGNIFICANT CHANGE UP
SODIUM SERPL-SCNC: 140 MMOL/L — SIGNIFICANT CHANGE UP (ref 135–145)
TROPONIN I, HIGH SENSITIVITY RESULT: 16.3 NG/L — SIGNIFICANT CHANGE UP
WBC # BLD: 6.11 K/UL — SIGNIFICANT CHANGE UP (ref 3.8–10.5)
WBC # FLD AUTO: 6.11 K/UL — SIGNIFICANT CHANGE UP (ref 3.8–10.5)

## 2024-06-01 PROCEDURE — 99418 PROLNG IP/OBS E/M EA 15 MIN: CPT

## 2024-06-01 PROCEDURE — 99285 EMERGENCY DEPT VISIT HI MDM: CPT

## 2024-06-01 PROCEDURE — G0316 PROLONG INPT EVAL ADD15 M: CPT

## 2024-06-01 PROCEDURE — 71250 CT THORAX DX C-: CPT | Mod: 26

## 2024-06-01 PROCEDURE — 93010 ELECTROCARDIOGRAM REPORT: CPT

## 2024-06-01 PROCEDURE — 71045 X-RAY EXAM CHEST 1 VIEW: CPT | Mod: 26

## 2024-06-01 PROCEDURE — 93970 EXTREMITY STUDY: CPT | Mod: 26

## 2024-06-01 PROCEDURE — 99223 1ST HOSP IP/OBS HIGH 75: CPT | Mod: GC

## 2024-06-01 RX ORDER — SODIUM CHLORIDE 9 MG/ML
1000 INJECTION, SOLUTION INTRAVENOUS
Refills: 0 | Status: DISCONTINUED | OUTPATIENT
Start: 2024-06-01 | End: 2024-06-07

## 2024-06-01 RX ORDER — DEXTROSE 10 % IN WATER 10 %
125 INTRAVENOUS SOLUTION INTRAVENOUS ONCE
Refills: 0 | Status: DISCONTINUED | OUTPATIENT
Start: 2024-06-01 | End: 2024-06-07

## 2024-06-01 RX ORDER — LORATADINE 10 MG/1
10 TABLET ORAL DAILY
Refills: 0 | Status: DISCONTINUED | OUTPATIENT
Start: 2024-06-01 | End: 2024-06-07

## 2024-06-01 RX ORDER — HEPARIN SODIUM 5000 [USP'U]/ML
5000 INJECTION INTRAVENOUS; SUBCUTANEOUS EVERY 8 HOURS
Refills: 0 | Status: DISCONTINUED | OUTPATIENT
Start: 2024-06-01 | End: 2024-06-02

## 2024-06-01 RX ORDER — CEFTRIAXONE 500 MG/1
1000 INJECTION, POWDER, FOR SOLUTION INTRAMUSCULAR; INTRAVENOUS ONCE
Refills: 0 | Status: COMPLETED | OUTPATIENT
Start: 2024-06-01 | End: 2024-06-01

## 2024-06-01 RX ORDER — METOPROLOL TARTRATE 50 MG
50 TABLET ORAL DAILY
Refills: 0 | Status: DISCONTINUED | OUTPATIENT
Start: 2024-06-01 | End: 2024-06-07

## 2024-06-01 RX ORDER — DEXTROSE 50 % IN WATER 50 %
25 SYRINGE (ML) INTRAVENOUS ONCE
Refills: 0 | Status: DISCONTINUED | OUTPATIENT
Start: 2024-06-01 | End: 2024-06-07

## 2024-06-01 RX ORDER — FUROSEMIDE 40 MG
20 TABLET ORAL
Refills: 0 | Status: DISCONTINUED | OUTPATIENT
Start: 2024-06-02 | End: 2024-06-01

## 2024-06-01 RX ORDER — POTASSIUM CHLORIDE 20 MEQ
20 PACKET (EA) ORAL DAILY
Refills: 0 | Status: DISCONTINUED | OUTPATIENT
Start: 2024-06-01 | End: 2024-06-02

## 2024-06-01 RX ORDER — FUROSEMIDE 40 MG
40 TABLET ORAL DAILY
Refills: 0 | Status: DISCONTINUED | OUTPATIENT
Start: 2024-06-01 | End: 2024-06-06

## 2024-06-01 RX ORDER — LANOLIN ALCOHOL/MO/W.PET/CERES
3 CREAM (GRAM) TOPICAL AT BEDTIME
Refills: 0 | Status: DISCONTINUED | OUTPATIENT
Start: 2024-06-01 | End: 2024-06-07

## 2024-06-01 RX ORDER — ATORVASTATIN CALCIUM 80 MG/1
40 TABLET, FILM COATED ORAL AT BEDTIME
Refills: 0 | Status: DISCONTINUED | OUTPATIENT
Start: 2024-06-01 | End: 2024-06-07

## 2024-06-01 RX ORDER — IPRATROPIUM/ALBUTEROL SULFATE 18-103MCG
3 AEROSOL WITH ADAPTER (GRAM) INHALATION EVERY 6 HOURS
Refills: 0 | Status: DISCONTINUED | OUTPATIENT
Start: 2024-06-01 | End: 2024-06-02

## 2024-06-01 RX ORDER — INSULIN LISPRO 100/ML
VIAL (ML) SUBCUTANEOUS
Refills: 0 | Status: DISCONTINUED | OUTPATIENT
Start: 2024-06-01 | End: 2024-06-03

## 2024-06-01 RX ORDER — INSULIN LISPRO 100/ML
VIAL (ML) SUBCUTANEOUS AT BEDTIME
Refills: 0 | Status: DISCONTINUED | OUTPATIENT
Start: 2024-06-01 | End: 2024-06-03

## 2024-06-01 RX ORDER — CEFTRIAXONE 500 MG/1
1000 INJECTION, POWDER, FOR SOLUTION INTRAMUSCULAR; INTRAVENOUS EVERY 24 HOURS
Refills: 0 | Status: DISCONTINUED | OUTPATIENT
Start: 2024-06-02 | End: 2024-06-03

## 2024-06-01 RX ORDER — DOXAZOSIN MESYLATE 4 MG
2 TABLET ORAL DAILY
Refills: 0 | Status: DISCONTINUED | OUTPATIENT
Start: 2024-06-01 | End: 2024-06-07

## 2024-06-01 RX ORDER — LATANOPROST 0.05 MG/ML
1 SOLUTION/ DROPS OPHTHALMIC; TOPICAL AT BEDTIME
Refills: 0 | Status: DISCONTINUED | OUTPATIENT
Start: 2024-06-01 | End: 2024-06-07

## 2024-06-01 RX ORDER — ACETAMINOPHEN 500 MG
650 TABLET ORAL EVERY 6 HOURS
Refills: 0 | Status: DISCONTINUED | OUTPATIENT
Start: 2024-06-01 | End: 2024-06-07

## 2024-06-01 RX ORDER — CLOPIDOGREL BISULFATE 75 MG/1
75 TABLET, FILM COATED ORAL DAILY
Refills: 0 | Status: DISCONTINUED | OUTPATIENT
Start: 2024-06-01 | End: 2024-06-07

## 2024-06-01 RX ORDER — DEXTROSE 50 % IN WATER 50 %
15 SYRINGE (ML) INTRAVENOUS ONCE
Refills: 0 | Status: DISCONTINUED | OUTPATIENT
Start: 2024-06-01 | End: 2024-06-07

## 2024-06-01 RX ORDER — GLUCAGON INJECTION, SOLUTION 0.5 MG/.1ML
1 INJECTION, SOLUTION SUBCUTANEOUS ONCE
Refills: 0 | Status: DISCONTINUED | OUTPATIENT
Start: 2024-06-01 | End: 2024-06-07

## 2024-06-01 RX ORDER — ONDANSETRON 8 MG/1
4 TABLET, FILM COATED ORAL EVERY 8 HOURS
Refills: 0 | Status: DISCONTINUED | OUTPATIENT
Start: 2024-06-01 | End: 2024-06-07

## 2024-06-01 RX ORDER — DEXTROSE 50 % IN WATER 50 %
12.5 SYRINGE (ML) INTRAVENOUS ONCE
Refills: 0 | Status: DISCONTINUED | OUTPATIENT
Start: 2024-06-01 | End: 2024-06-07

## 2024-06-01 RX ORDER — FUROSEMIDE 40 MG
40 TABLET ORAL ONCE
Refills: 0 | Status: COMPLETED | OUTPATIENT
Start: 2024-06-01 | End: 2024-06-01

## 2024-06-01 RX ADMIN — LATANOPROST 1 DROP(S): 0.05 SOLUTION/ DROPS OPHTHALMIC; TOPICAL at 22:26

## 2024-06-01 RX ADMIN — Medication 40 MILLIGRAM(S): at 14:52

## 2024-06-01 RX ADMIN — CEFTRIAXONE 1000 MILLIGRAM(S): 500 INJECTION, POWDER, FOR SOLUTION INTRAMUSCULAR; INTRAVENOUS at 20:00

## 2024-06-01 RX ADMIN — CEFTRIAXONE 100 MILLIGRAM(S): 500 INJECTION, POWDER, FOR SOLUTION INTRAMUSCULAR; INTRAVENOUS at 19:30

## 2024-06-01 RX ADMIN — HEPARIN SODIUM 5000 UNIT(S): 5000 INJECTION INTRAVENOUS; SUBCUTANEOUS at 22:26

## 2024-06-01 RX ADMIN — Medication 3 MILLILITER(S): at 21:43

## 2024-06-01 RX ADMIN — ATORVASTATIN CALCIUM 40 MILLIGRAM(S): 80 TABLET, FILM COATED ORAL at 22:26

## 2024-06-01 RX ADMIN — Medication 100 MILLIGRAM(S): at 21:33

## 2024-06-01 NOTE — H&P ADULT - ASSESSMENT
89-year-old male with PMHx HTN, CAD with pacemaker, PVD, T2DM, essential tremor, hx prostate cancer, presented from [facility] for concern of shortness of breath for the past 6-8 weeks and associated b/l swelling for the past 1.5 months, admited for CHF exacerbation PNA, and r/o PE.    #CHF exacerbation  BNP 3408. Trop x1 neg  EKG sinus with complete heart block, ventricular placed, .  s/p lasix 40mg IV in ED  -telemetry  -repeat trop  -echo, TSH  -lasix 20mg IV BID  -strict I&Os q8, daily weights  -2L H2O restriction, 2g sodium restriction  -cardiology consult for pacemaker interrogation and CHF exacerbation    #PNA  CXR right lower lung opacity  rocephin and doxy  c/w NC at 2L, wean as tolerated  check RVP, procal  -duonebs q6  -claritin qd  -f/u BCx  -UA, UCx, urine legionella Ag, urine strep  PT/OT    #PE  d-dimer 686  LE doppler  VQ scan  wound care for LE     #ALVIN vs CKD  Cr 1.69, GFR 36  renal sono  nephro consult    #T2DM  hold home metformin  A1c ordered ISS    DVT ppx: Heparin  Diet: DASH, consistent carb  Code: Full 89-year-old male with PMHx HTN, CAD with pacemaker, PVD, T2DM, essential tremor, hx prostate cancer, presented from [facility] for concern of shortness of breath for the past 6-8 weeks and associated b/l swelling for the past 1.5 months, admited for CHF exacerbation PNA, and r/o PE.    #CHF exacerbation  BNP 3408. Trop x1 neg  EKG sinus with complete heart block, ventricular placed, .  s/p lasix 40mg IV in ED  -telemetry  -repeat trop  -echo, TSH  -lasix 20mg IV BID  -strict I&Os q8, daily weights  -2L H2O restriction, 2g sodium restriction  -cardiology consult for pacemaker interrogation and CHF exacerbation    #PNA  CXR right lower lung opacity  rocephin and doxy  c/w NC at 2L, wean as tolerated  check RVP, procal  -duonebs q6  -claritin qd  -f/u BCx  -UA, UCx, urine legionella Ag, urine strep  -PT/OT    #PE  d-dimer 686  -LE doppler  -V/Q scan   -hold off heparin drip, patient not tachy or tachypneic   -wound care for LE    #ALVIN vs CKD  -Cr 1.69, GFR 36  -renal sono  -nephro consult    #T2DM  hold home metformin  A1c ordered ISS    DVT ppx: Heparin  Diet: DASH, consistent carb  Code: Full 89-year-old male with PMHx HTN, CAD with pacemaker, PVD, T2DM, essential tremor, hx prostate cancer, presented from [facility] for concern of shortness of breath for the past 6-8 weeks and associated b/l swelling for the past 1.5 months, admited for CHF exacerbation PNA, and r/o PE.    #CHF exacerbation  BNP 3408. Trop x1 neg  EKG sinus with complete heart block, ventricular placed, .  s/p lasix 40mg IV in ED  -telemetry  -repeat trop  -echo, TSH  -lasix 20mg IV BID  -strict I&Os q8, daily weights  -2L H2O restriction, 2g sodium restriction  -cardiology consult for pacemaker interrogation and CHF exacerbation    #PNA  CXR right lower lung opacity  CT chest prelim: Loculated right pleural effusion in the right major and minor fissures. Small posterior bilateral pleural effusions (right greater than left) and mild bibasilar atelectasis.  -f/u final read of CT chest  -rocephin and doxy  -claritin qd  -check RVP, procal  -duonebs q6  -c/w NC at 2L, wean as tolerated  -f/u BCx  -UA, UCx, urine legionella Ag, urine strep ordered  -pulm consult for pulm effusion seen on prelim CT finding  -PT/OT consult    #Elevated d-dimer, r/o PE  d-dimer 686  -LE doppler  -V/Q scan   -hold off heparin drip, patient not tachy or tachypneic   -wound care for LE    #ALVIN vs CKD  -Cr 1.69, GFR 36  -renal sono  -nephro consult    #T2DM  hold home metformin  A1c ordered ISS    DVT ppx: Heparin  Diet: DASH, consistent carb  Code: Full 89-year-old male with PMHx HTN, CAD with pacemaker, PVD, T2DM, essential tremor, hx prostate cancer, presented from [facility] for concern of shortness of breath for the past 6-8 weeks and associated b/l swelling for the past 1.5 months, admited for CHF exacerbation PNA, and r/o PE.    #CHF exacerbation  BNP 3408. Trop x1 neg  EKG sinus with complete heart block, ventricular placed, .  s/p lasix 40mg IV in ED  -telemetry  -repeat trop  -echo, TSH  -lasix 20mg IV BID  -strict I&Os q8, daily weights  -2L H2O restriction, 2g sodium restriction  -cardiology consult for pacemaker interrogation and CHF exacerbation    #PNA  CXR right lower lung opacity  CT chest prelim: Loculated right pleural effusion in the right major and minor fissures. Small posterior bilateral pleural effusions (right greater than left) and mild bibasilar atelectasis.  -f/u final read of CT chest  -rocephin and doxy  -claritin qd  -check RVP, procal  -duonebs q6  -c/w NC at 2L, wean as tolerated  -f/u BCx  -UA, UCx, urine legionella Ag, urine strep ordered  -pulm consult for pulm effusion seen on prelim CT finding  -PT/OT consult    #Elevated d-dimer, r/o PE  d-dimer 686  -LE doppler  -V/Q scan   -hold off heparin drip, patient not tachy or tachypneic   -wound care for LE    #ALVIN vs CKD  -Cr 1.69, GFR 36  -renal sono  -nephro consult    #T2DM  hold home metformin  A1c ordered  ISS  hypoglycemic protocol    DVT ppx: Heparin  Diet: DASH, consistent carb  Code: Full

## 2024-06-01 NOTE — ED PROVIDER NOTE - CARE PLAN
1 Principal Discharge DX:	Acute on chronic systolic congestive heart failure  Secondary Diagnosis:	Bilateral lower leg cellulitis

## 2024-06-01 NOTE — H&P ADULT - NSHPLABSRESULTS_GEN_ALL_CORE
< from: Xray Chest 1 View- PORTABLE-Urgent (06.01.24 @ 13:52) >    FINDINGS:    Limited exam. The patient's right hand overlies the right lower chest.    Sternotomy, CABG and TAVR.    Left-sided pacemaker.    Cardiomegaly.    Likely right lower lung opacity. The left lung is clear. No pneumothorax.    Degenerative changes of the spine. No acute osseous abnormalities.    IMPRESSION:  Limited exam due to patient's right hand overlying the right lower chest.   Likely right lower lung opacity is indeterminate. Rigo    < end of copied text >    < from: US Duplex Venous Lower Ext Complete, Bilateral (06.01.24 @ 19:02) >    IMPRESSION:  No evidence of deep venous thrombosis in either lower extremity.    < end of copied text > < from: Xray Chest 1 View- PORTABLE-Urgent (06.01.24 @ 13:52) >    FINDINGS:    Limited exam. The patient's right hand overlies the right lower chest.    Sternotomy, CABG and TAVR.    Left-sided pacemaker.    Cardiomegaly.    Likely right lower lung opacity. The left lung is clear. No pneumothorax.    Degenerative changes of the spine. No acute osseous abnormalities.    IMPRESSION:  Limited exam due to patient's right hand overlying the right lower chest.   Likely right lower lung opacity is indeterminate.   < from: US Duplex Venous Lower Ext Complete, Bilateral (06.01.24 @ 19:02) >    IMPRESSION:  No evidence of deep venous thrombosis in either lower extremity.    < end of copied text >      CT chest (Prelim):  Loculated right pleural effusion in the right major and minor fissures. Small posterior bilateral pleural effusions (right greater than left) and mild bibasilar atelectasis.

## 2024-06-01 NOTE — H&P ADULT - NSICDXPASTMEDICALHX_GEN_ALL_CORE_FT
PAST MEDICAL HISTORY:  CAD (coronary artery disease)     Diabetes     HTN (hypertension)     Pacemaker

## 2024-06-01 NOTE — H&P ADULT - ATTENDING COMMENTS
#CHF exacerbation  #RLL PNA  #Possible PE  #ALVIN vs CKD?  - CT chest non contrast  - RVP  - Duonebs q6hr  - Ceftriaxone and doxycycline  - Lasix 40IV daily  - LE doppler negative  - Renal sonogram  - TSH  - v/q scan in AM  - hold off heparin drip, patient not tachy or tachypneic   - echocardiogram  - UA/Ucx, Urine strep, urine legionella   - Claritan 10mg daily  - Wound care consult for LE  - PT/OT  - Cards consult - pacemaker interrogation, shortness of breath  - Renal consult  - pulm consult

## 2024-06-01 NOTE — ED ADULT NURSE NOTE - CHIEF COMPLAINT QUOTE
Patient brought in by EMS from [facility] with complaint of SOB for the past month. Patient states to have SOB when walking. Patient also complaint of increased swelling in his legs

## 2024-06-01 NOTE — ED ADULT TRIAGE NOTE - CHIEF COMPLAINT QUOTE
Patient brought in by EMS from [facility] with complaint of SOB for the past month. Patient states to have SOB when walking. Patient also complaint of increased swelling in his legs

## 2024-06-01 NOTE — H&P ADULT - NSHPREVIEWOFSYSTEMS_GEN_ALL_CORE
#CHF exacerbation  BNP 3408. Trop x1 neg  s/p lasix 40mg IV in ED  -telemetry  -repeat trop  -echo, TSH  -strict I&Os q8, daily weights  -2L H2O restriction, 2g sodium restriction  -cardiology consult      #PNA  CXR right lower lung opacity  rocephin  azithro  c/w NC at 2L, wean as tolerated  check RVP, procal, MRSA PCR?  duonebs q6  f/u BCx    #PE  d-dimer 686  VQ scan    #ALVIN vs CKD  Cr 1.69, GFR 36

## 2024-06-01 NOTE — ED PROVIDER NOTE - OBJECTIVE STATEMENT
89-year-old male with shortness of breath for a few weeks, worsening over the past couple of days, sent from  [facility].  Patient reports  dry cough,  denies fever, NVD, dysuria, chest/abdominal/back/neck pain, headache, focal weakness/numbness.  Reports bilateral worsening leg swelling.  Denies any other symptoms.

## 2024-06-01 NOTE — H&P ADULT - NSHPPHYSICALEXAM_GEN_ALL_CORE
· CONSTITUTIONAL: acute on chronic ill appearing, awake, alert, oriented to person, place, time/situation and in no apparent distress.  · ENMT: Airway patent, Nasal mucosa clear. Mouth with normal mucosa. Throat has no vesicles, no oropharyngeal exudates and uvula is midline.  · EYES: Clear bilaterally, pupils equal, round and reactive to light.  · CARDIAC: Normal rate, regular rhythm.  Heart sounds S1, S2.  No murmurs, rubs or gallops.  · RESPIRATORY: Breath sounds clear and equal bilaterally.  · GASTROINTESTINAL: Abdomen soft, non-tender, no guarding.  · MUSCULOSKELETAL: bl lower leg pitting edema, 1+  · NEUROLOGICAL: Alert and oriented, no focal deficits, no motor or sensory deficits.  · SKIN: Skin normal color for race, warm, dry and intact. No evidence of rash.  · PSYCHIATRIC: no apparent risk to self or others. CONSTITUTIONAL: Awake, alert, NAD  ENMT: Airway patent, Nasal mucosa clear. Mouth with normal mucosa.  EYES: Clear bilaterally, pupils equal.  CARDIAC: Normal rate, regular rhythm.  Heart sounds S1, S2.  No murmurs.  RESPIRATORY: crackles at RLL. no respiratory distress  GASTROINTESTINAL: Abdomen soft, non-tender, no guarding.  MUSCULOSKELETAL: bl lower leg pitting edema, dry, abrasion on R thigh  NEUROLOGICAL: Alert and oriented x3, no focal deficits. +b/l hand tremors  SKIN: Skin normal color, warm, dry and intact.  PSYCHIATRIC: no apparent risk to self or others.

## 2024-06-01 NOTE — ED PROVIDER NOTE - CONSTITUTIONAL, MLM
normal... acute on chronic ill appearing, awake, alert, oriented to person, place, time/situation and in no apparent distress.

## 2024-06-01 NOTE — ED PROVIDER NOTE - CLINICAL SUMMARY MEDICAL DECISION MAKING FREE TEXT BOX
89-year-old male with worsening shortness of breath/bilateral lower swelling.  labs reviewed- ALVIN, elevated  proBNP, D-dimer noted.  x-ray shows possible right infiltrate, will cover with Rocephin, culture sent.   Bilateral Doppler ultrasound showed no DVT, unable to get CTA due to ALVIN. Patient remains on 2 L oxygen saturating high 90s. will admit for CHFE  with superimposed PNA, r/o PE

## 2024-06-01 NOTE — H&P ADULT - HISTORY OF PRESENT ILLNESS
89-year-old male with PMHX HTN, CAD with pacemaker? presenting from [facility] for c/o shortness of breath for a few weeks. SOB worsening over the past couple of days, Patient reports  dry cough,  denies fever, NVD, dysuria, chest/abdominal/back/neck pain, headache, focal weakness/numbness.  Reports bilateral worsening leg swelling.  Denies any other symptoms.    In ED, afebrile. HR 70-80, -151/, RR 18-21, SpO2 97-98% on 2L NC. Labs remarkable for Hg 11.0, MCV 76.1, d-dimer 686, Cr 1.69, GFR 36, BNP 3408. Trop x1 neg. US LE neg for DVT. CXR  with indeterminate right lower lung opacity. Given rocephin x1, furosemide 40mg, BCx 89-year-old male with PMHX HTN, CAD with pacemaker, PVD, T2DM, essential tremor, hx prostate cancer, presenting from [facility] for concern of shortness of breath for the past 6-8 weeks. Associated b/l swelling for the past 1.5 months. Pt also states dry cough x2.5 weeks likely 2/2 seasonal allergies. Pt denies acute worsening of SOB and is unsure why nursing home sent him here today. Cough improves with lozenges.  Denies fever, N/V, diarrhea, chest pain, abdominal pain, headache.     In ED, afebrile. HR 70-80, -151/, RR 18-21, SpO2 97-98% on 2L NC. Labs remarkable for Hg 11.0, MCV 76.1, d-dimer 686, Cr 1.69, GFR 36, BNP 3408. Trop x1 neg. US LE neg for DVT. EKG sinus with complete heart block, ventricular placed, . CXR  with indeterminate right lower lung opacity. Given rocephin x1, furosemide 40mg, BCx 89-year-old male with PMHX HTN, CAD with pacemaker, PVD, T2DM, essential tremor, hx prostate cancer, presenting from [facility] for concern of shortness of breath for the past 6-8 weeks. Associated b/l swelling for the past 1.5 months. Pt also states dry cough x2.5 weeks likely 2/2 seasonal allergies. Pt denies acute worsening of SOB and is unsure why nursing home sent him here today. Cough improves with lozenges.  Denies fever, N/V, diarrhea, chest pain, abdominal pain, headache.     In ED, afebrile. HR 70-80, -151/, RR 18-21, SpO2 97-98% on 2L NC. Labs remarkable for Hg 11.0, MCV 76.1, d-dimer 686, Cr 1.69, GFR 36, BNP 3408. Trop x1 neg. US LE neg for DVT. EKG sinus with complete heart block, ventricular placed, . CXR  with indeterminate right lower lung opacity. Given rocephin x1, furosemide 40mg. 89-year-old male with PMHx HTN, CAD with pacemaker, PVD, T2DM, essential tremor, hx prostate cancer, presenting from [facility] for concern of shortness of breath for the past 6-8 weeks. Associated b/l swelling for the past 1.5 months. Pt also states dry cough x2.5 weeks likely 2/2 seasonal allergies. Pt denies acute worsening of SOB and is unsure why the nursing home sent him here today. Cough improves with lozenges.  Denies fever, N/V, diarrhea, chest pain, abdominal pain, headache.     In ED, afebrile. HR 70-80, -151/, RR 18-21, SpO2 97-98% on 2L NC. Labs remarkable for Hg 11.0, MCV 76.1, d-dimer 686, Cr 1.69, GFR 36, BNP 3408. Trop x1 neg. US LE neg for DVT. EKG sinus with complete heart block, ventricular placed, . CXR  with indeterminate right lower lung opacity. Given rocephin x1, furosemide 40mg.

## 2024-06-01 NOTE — PATIENT PROFILE ADULT - FALL HARM RISK - HARM RISK INTERVENTIONS

## 2024-06-02 LAB
-  COAGULASE NEGATIVE STAPHYLOCOCCUS: SIGNIFICANT CHANGE UP
A1C WITH ESTIMATED AVERAGE GLUCOSE RESULT: 8.2 % — HIGH (ref 4–5.6)
ALBUMIN SERPL ELPH-MCNC: 2.9 G/DL — LOW (ref 3.3–5)
ALP SERPL-CCNC: 169 U/L — HIGH (ref 40–120)
ALT FLD-CCNC: 22 U/L — SIGNIFICANT CHANGE UP (ref 10–45)
ANION GAP SERPL CALC-SCNC: 9 MMOL/L — SIGNIFICANT CHANGE UP (ref 5–17)
AST SERPL-CCNC: 21 U/L — SIGNIFICANT CHANGE UP (ref 10–40)
BILIRUB SERPL-MCNC: 1.1 MG/DL — SIGNIFICANT CHANGE UP (ref 0.2–1.2)
BUN SERPL-MCNC: 40 MG/DL — HIGH (ref 7–23)
CALCIUM SERPL-MCNC: 8.8 MG/DL — SIGNIFICANT CHANGE UP (ref 8.4–10.5)
CHLORIDE SERPL-SCNC: 103 MMOL/L — SIGNIFICANT CHANGE UP (ref 96–108)
CO2 SERPL-SCNC: 27 MMOL/L — SIGNIFICANT CHANGE UP (ref 22–31)
CREAT SERPL-MCNC: 1.57 MG/DL — HIGH (ref 0.5–1.3)
EGFR: 40 ML/MIN/1.73M2 — LOW
ESTIMATED AVERAGE GLUCOSE: 189 MG/DL — HIGH (ref 68–114)
GLUCOSE BLDC GLUCOMTR-MCNC: 147 MG/DL — HIGH (ref 70–99)
GLUCOSE BLDC GLUCOMTR-MCNC: 163 MG/DL — HIGH (ref 70–99)
GLUCOSE BLDC GLUCOMTR-MCNC: 167 MG/DL — HIGH (ref 70–99)
GLUCOSE BLDC GLUCOMTR-MCNC: 167 MG/DL — HIGH (ref 70–99)
GLUCOSE BLDC GLUCOMTR-MCNC: 190 MG/DL — HIGH (ref 70–99)
GLUCOSE SERPL-MCNC: 159 MG/DL — HIGH (ref 70–99)
GRAM STN FLD: ABNORMAL
HCT VFR BLD CALC: 33.8 % — LOW (ref 39–50)
HGB BLD-MCNC: 10.9 G/DL — LOW (ref 13–17)
MAGNESIUM SERPL-MCNC: 1.8 MG/DL — SIGNIFICANT CHANGE UP (ref 1.6–2.6)
MCHC RBC-ENTMCNC: 24.4 PG — LOW (ref 27–34)
MCHC RBC-ENTMCNC: 32.2 GM/DL — SIGNIFICANT CHANGE UP (ref 32–36)
MCV RBC AUTO: 75.6 FL — LOW (ref 80–100)
METHOD TYPE: SIGNIFICANT CHANGE UP
NRBC # BLD: 0 /100 WBCS — SIGNIFICANT CHANGE UP (ref 0–0)
NT-PROBNP SERPL-SCNC: 3280 PG/ML — HIGH (ref 0–300)
PHOSPHATE SERPL-MCNC: 3.4 MG/DL — SIGNIFICANT CHANGE UP (ref 2.5–4.5)
PLATELET # BLD AUTO: 148 K/UL — LOW (ref 150–400)
POTASSIUM SERPL-MCNC: 4 MMOL/L — SIGNIFICANT CHANGE UP (ref 3.5–5.3)
POTASSIUM SERPL-SCNC: 4 MMOL/L — SIGNIFICANT CHANGE UP (ref 3.5–5.3)
PROCALCITONIN SERPL-MCNC: 0.14 NG/ML — HIGH
PROT SERPL-MCNC: 7.2 G/DL — SIGNIFICANT CHANGE UP (ref 6–8.3)
RAPID RVP RESULT: SIGNIFICANT CHANGE UP
RBC # BLD: 4.47 M/UL — SIGNIFICANT CHANGE UP (ref 4.2–5.8)
RBC # FLD: 16.8 % — HIGH (ref 10.3–14.5)
S PNEUM AG UR QL: NEGATIVE — SIGNIFICANT CHANGE UP
SARS-COV-2 RNA SPEC QL NAA+PROBE: SIGNIFICANT CHANGE UP
SODIUM SERPL-SCNC: 139 MMOL/L — SIGNIFICANT CHANGE UP (ref 135–145)
SPECIMEN SOURCE: SIGNIFICANT CHANGE UP
TROPONIN I, HIGH SENSITIVITY RESULT: 20.8 NG/L — SIGNIFICANT CHANGE UP
TSH SERPL-MCNC: 3.67 UIU/ML — SIGNIFICANT CHANGE UP (ref 0.36–3.74)
WBC # BLD: 6.89 K/UL — SIGNIFICANT CHANGE UP (ref 3.8–10.5)
WBC # FLD AUTO: 6.89 K/UL — SIGNIFICANT CHANGE UP (ref 3.8–10.5)

## 2024-06-02 PROCEDURE — 99233 SBSQ HOSP IP/OBS HIGH 50: CPT

## 2024-06-02 PROCEDURE — 99222 1ST HOSP IP/OBS MODERATE 55: CPT

## 2024-06-02 PROCEDURE — 76775 US EXAM ABDO BACK WALL LIM: CPT | Mod: 26

## 2024-06-02 RX ORDER — RIVAROXABAN 15 MG-20MG
15 KIT ORAL
Refills: 0 | Status: DISCONTINUED | OUTPATIENT
Start: 2024-06-02 | End: 2024-06-07

## 2024-06-02 RX ORDER — INSULIN LISPRO 100/ML
VIAL (ML) SUBCUTANEOUS EVERY 6 HOURS
Refills: 0 | Status: DISCONTINUED | OUTPATIENT
Start: 2024-06-02 | End: 2024-06-02

## 2024-06-02 RX ORDER — BENZOCAINE AND MENTHOL 5; 1 G/100ML; G/100ML
1 LIQUID ORAL ONCE
Refills: 0 | Status: COMPLETED | OUTPATIENT
Start: 2024-06-02 | End: 2024-06-02

## 2024-06-02 RX ADMIN — RIVAROXABAN 15 MILLIGRAM(S): KIT at 19:16

## 2024-06-02 RX ADMIN — CEFTRIAXONE 100 MILLIGRAM(S): 500 INJECTION, POWDER, FOR SOLUTION INTRAMUSCULAR; INTRAVENOUS at 18:13

## 2024-06-02 RX ADMIN — Medication 50 MILLIGRAM(S): at 05:36

## 2024-06-02 RX ADMIN — LATANOPROST 1 DROP(S): 0.05 SOLUTION/ DROPS OPHTHALMIC; TOPICAL at 21:27

## 2024-06-02 RX ADMIN — LORATADINE 10 MILLIGRAM(S): 10 TABLET ORAL at 12:08

## 2024-06-02 RX ADMIN — ATORVASTATIN CALCIUM 40 MILLIGRAM(S): 80 TABLET, FILM COATED ORAL at 21:27

## 2024-06-02 RX ADMIN — Medication 1 TABLET(S): at 12:08

## 2024-06-02 RX ADMIN — Medication 100 MILLIGRAM(S): at 05:36

## 2024-06-02 RX ADMIN — CLOPIDOGREL BISULFATE 75 MILLIGRAM(S): 75 TABLET, FILM COATED ORAL at 12:08

## 2024-06-02 RX ADMIN — Medication 0: at 08:21

## 2024-06-02 RX ADMIN — Medication 1: at 12:09

## 2024-06-02 RX ADMIN — Medication 100 MILLIGRAM(S): at 18:13

## 2024-06-02 RX ADMIN — HEPARIN SODIUM 5000 UNIT(S): 5000 INJECTION INTRAVENOUS; SUBCUTANEOUS at 05:36

## 2024-06-02 RX ADMIN — Medication 40 MILLIGRAM(S): at 05:36

## 2024-06-02 RX ADMIN — Medication 1: at 17:11

## 2024-06-02 RX ADMIN — Medication 2 MILLIGRAM(S): at 05:36

## 2024-06-02 NOTE — PHYSICAL THERAPY INITIAL EVALUATION ADULT - PERTINENT HX OF CURRENT PROBLEM, REHAB EVAL
89-year-old male with PMHx HTN, CAD with pacemaker, PVD, T2DM, essential tremor, hx prostate cancer, presented from [facility] for concern of shortness of breath for the past 6-8 weeks and associated b/l swelling for the past 1.5 months, admited for CHF exacerbation PNA, and r/o PE.    #CHF exacerbation  BNP 3408. Trop x1 neg  EKG sinus with complete heart block, ventricular placed, .  s/p lasix 40mg IV in ED

## 2024-06-02 NOTE — CHART NOTE - NSCHARTNOTEFT_GEN_A_CORE
Notified by RN, that Pt had a visitor today, who tested positive for COVID. Pt c/o cough tonight. Will place in isolation due to exposure, Pt will need to be transferred from surgical floor. Ordered RVP, and lozenge. Will sign out to day team. Notified by RN, that Pt had a visitor today, who tested positive for COVID. Pt c/o cough tonight. Ordered RVP, and lozenge. Pt will need to be transferred from surgical floor as per mgt. RVP negative. Will sign out to day team. Notified by RN, that Pt had a visitor today, who tested positive for COVID. Pt c/o cough tonight. Ordered RVP, and lozenge. Pt will need to be transferred from surgical floor as per mgt due to positive blood culture with gram +cocci in pairs. RVP negative. Will sign out to day team.

## 2024-06-02 NOTE — PHYSICAL THERAPY INITIAL EVALUATION ADULT - ORIENTATION, REHAB EVAL
See ultrasound report for details of ultrasound evaluation and recommendations.     
Normal ultrasound
oriented to person, place, time and situation

## 2024-06-02 NOTE — CONSULT NOTE ADULT - SUBJECTIVE AND OBJECTIVE BOX
PULMONARY CONSULT  Location of Patient :  2SUR [id number] ([facility] 2SUR)        Initial HPI on admission:  HPI:  89 year old male with history of lung cancer (unsure weather right or left) s/p ? resection denies chemo and unsure of radiation, non smoker, denies h/o COPD/Asthma, HTN, CAD with pacemaker, PVD, T2DM, essential tremor, hx prostate cancer, presenting from [facility] for concern of shortness of breath for the past 6-8 weeks. Associated b/l swelling for the past 1.5 months. Pt also states dry cough years worsening over past few weeeks likely 2/2 seasonal allergies (based on dtrs bedside, as occurs this time of year) . Pt denies acute worsening of SOB and is unsure why the nursing home sent him here today. Cough improves with lozenges.  Denies fever, N/V, diarrhea, chest pain, abdominal pain, headache.     In ED, afebrile. HR 70-80, -151/, RR 18-21, SpO2 97-98% on 2L NC. Labs remarkable for Hg 11.0, MCV 76.1, d-dimer 686, Cr 1.69, GFR 36, BNP 3408. Trop x1 neg. US LE neg for DVT. EKG sinus with complete heart block, ventricular placed, . CXR  with indeterminate right lower lung opacity. Given rocephin x1, furosemide 40mg. (01 Jun 2024 19:25)    Pulmonary consult called today to evaluate sob  patient seen and examined in chair on room air  states sob slightly better  less sob, cough, secretions    PAST MEDICAL & SURGICAL HISTORY:  Pacemaker  CAD (coronary artery disease)  HTN (hypertension)  Diabetes        Allergies  No Known Allergies      FAMILY HISTORY: Denies    Social history     Smoking: denies     Drinking:     Drug use:    Review of Systems: as stated above  patient states is forgetful and poor historian     CONSTITUTIONAL: No fever, No chills, +fatigue  EYES: No eye pain, No visual disturbances, No discharge  ENMT:  No difficulty hearing, No tinnitus, No vertigo; No sinus or throat pain  NECK: No pain, No stiffness  RESPIRATORY: + Cough, +SO/DOEB, No Secretions  CARDIOVASCULAR: No chest pain, No palpitations, No dizziness, or +leg swelling  GASTROINTESTINAL: No abdominal or epigastric pain. No nausea, No vomiting, No hematemesis; No diarrhea, No constipation. No melena, No hematochezia.  GENITOURINARY: No dysuria, No frequency, No hematuria, No incontinence  NEUROLOGICAL: No headaches, No memory loss, No loss of strength, No numbness, No tremors  SKIN: No itching, No burning, No rashes, No lesions   MUSCULOSKELETAL: No joint pain or swelling; No muscle, back, No extremity pain  PSYCHIATRIC: No depression, No anxiety, No mood swings, No difficulty sleeping      Medications:  MEDICATIONS  (STANDING):  albuterol/ipratropium for Nebulization 3 milliLiter(s) Nebulizer every 6 hours  atorvastatin 40 milliGRAM(s) Oral at bedtime  cefTRIAXone   IVPB 1000 milliGRAM(s) IV Intermittent every 24 hours  clopidogrel Tablet 75 milliGRAM(s) Oral daily  dextrose 10% Bolus 125 milliLiter(s) IV Bolus once  dextrose 5%. 1000 milliLiter(s) (100 mL/Hr) IV Continuous <Continuous>  dextrose 5%. 1000 milliLiter(s) (50 mL/Hr) IV Continuous <Continuous>  dextrose 50% Injectable 25 Gram(s) IV Push once  dextrose 50% Injectable 12.5 Gram(s) IV Push once  doxazosin 2 milliGRAM(s) Oral daily  doxycycline IVPB      doxycycline IVPB 100 milliGRAM(s) IV Intermittent every 12 hours  furosemide   Injectable 40 milliGRAM(s) IV Push daily  glucagon  Injectable 1 milliGRAM(s) IntraMuscular once  heparin   Injectable 5000 Unit(s) SubCutaneous every 8 hours  insulin lispro (ADMELOG) corrective regimen sliding scale   SubCutaneous three times a day before meals  insulin lispro (ADMELOG) corrective regimen sliding scale   SubCutaneous at bedtime  latanoprost 0.005% Ophthalmic Solution 1 Drop(s) Both EYES at bedtime  loratadine 10 milliGRAM(s) Oral daily  metoprolol succinate ER 50 milliGRAM(s) Oral daily  multivitamin 1 Tablet(s) Oral daily    MEDICATIONS  (PRN):  acetaminophen     Tablet .. 650 milliGRAM(s) Oral every 6 hours PRN Temp greater or equal to 38C (100.4F), Mild Pain (1 - 3)  aluminum hydroxide/magnesium hydroxide/simethicone Suspension 30 milliLiter(s) Oral every 4 hours PRN Dyspepsia  dextrose Oral Gel 15 Gram(s) Oral once PRN Blood Glucose LESS THAN 70 milliGRAM(s)/deciliter  melatonin 3 milliGRAM(s) Oral at bedtime PRN Insomnia  ondansetron Injectable 4 milliGRAM(s) IV Push every 8 hours PRN Nausea and/or Vomiting      Antibiotics History  cefTRIAXone   IVPB 1000 milliGRAM(s) IV Intermittent once, 06-01-24 @ 18:47, Stop order after: 1 Doses  cefTRIAXone   IVPB 1000 milliGRAM(s) IV Intermittent every 24 hours, 06-02-24 @ 18:00, Stop order after: 5 Days  doxycycline IVPB    , 06-01-24 @ 21:33  doxycycline IVPB 100 milliGRAM(s) IV Intermittent once, 06-01-24 @ 20:24  doxycycline IVPB 100 milliGRAM(s) IV Intermittent every 12 hours, 06-02-24 @ 06:00      Heme Medications   clopidogrel Tablet 75 milliGRAM(s) Oral daily, 06-01-24 @ 21:04  heparin   Injectable 5000 Unit(s) SubCutaneous every 8 hours, 06-01-24 @ 20:27      GI Medications  aluminum hydroxide/magnesium hydroxide/simethicone Suspension 30 milliLiter(s) Oral every 4 hours, 06-01-24 @ 20:30, Routine PRN        Home Medications:  Last Order Reconciliation Date: 06-01-24 @ 21:05 (Admission Reconciliation)  atorvastatin 80 mg oral tablet: 0.5 tab(s) orally once a day (at bedtime) (06-01-24 @ 20:55)  doxazosin 2 mg oral tablet: 1 tab(s) orally once a day (06-01-24 @ 20:54)  furosemide 20 mg oral tablet: 2 tab(s) orally once a day (06-01-24 @ 20:54)  latanoprost 0.005% ophthalmic solution: 1 drop(s) in each eye once a day (at bedtime) (06-01-24 @ 20:55)  metFORMIN 500 mg oral tablet: 1 tab(s) orally once a day (06-01-24 @ 20:55)  metoprolol succinate 100 mg oral tablet, extended release: 0.5 tab(s) orally once a day (06-01-24 @ 20:55)  Multiple Vitamins oral tablet: 1 tab(s) orally once a day (06-01-24 @ 21:05)  Plavix 75 mg oral tablet: 1 tab(s) orally once a day (06-01-24 @ 20:54)  potassium chloride 10 mEq oral capsule, extended release: 2 cap(s) orally once a day (06-01-24 @ 20:55)  PreserVision AREDS 2 oral capsule: 1 cap(s) orally once a day (06-01-24 @ 20:55)      LABS:                        10.9   6.89  )-----------( 148      ( 02 Jun 2024 06:00 )             33.8     06-02    139  |  103  |  40<H>  ----------------------------<  159<H>  4.0   |  27  |  1.57<H>    Ca    8.8      02 Jun 2024 06:00  Phos  3.4     06-02  Mg     1.8     06-02    TPro  7.2  /  Alb  2.9<L>  /  TBili  1.1  /  DBili  x   /  AST  21  /  ALT  22  /  AlkPhos  169<H>  06-02    HIT ab -- 06-01 @ 13:50  HIT ab EIA --  D Dimer -686  Procalcitonin: 0.14 ng/mL (06-02-24 @ 06:00)         RADIOLOGY  CXR:      CT:  < from: CT Chest No Cont (06.01.24 @ 22:58) >    ACC: [id number] EXAM:  CT CHEST   ORDERED BY:  MANDO LUX     PROCEDURE DATE:  06/01/2024          INTERPRETATION:  EXAMINATION: CT CHEST    CLINICAL INDICATION: Lung opacity.    TECHNIQUE: Noncontrast CT of the chest was obtained.    COMPARISON: None.    FINDINGS:    AIRWAYS AND LUNGS: The central tracheobronchial tree is patent.  Ill-defined patchy and clustered bilateral lung opacities. Left lower lobe subpleural 4 mm nodule versus intrapulmonary lymph node. Right lung   postsurgical changes.    MEDIASTINUM AND PLEURA: There are no enlarged mediastinal, hilar or axillary lymph nodes. The visualized portion of the thyroid gland is heterogeneous. Right-sided pleural thickening. Small loculated and   nodular bilateral pleural effusions. There is no pneumothorax.    HEART AND VESSELS: There is cardiomegaly.  There are atherosclerotic calcifications of the aorta and coronary arteries.  There is no pericardial effusion.  TAVR. Left-sided pacemaker. Sternotomy and CABG.    UPPER ABDOMEN: Images of the upper abdomen demonstrate cholecystectomy.    BONES AND SOFT TISSUES: There are degenerative changes of the spine.  The   soft tissues are unremarkable.      IMPRESSION:  Ill-defined patchy and clustered bilateral lung opacities.    Right-sided pleural thickening. Small loculated and nodular bilateral pleural effusions.    --- End of Report ---    < end of copied text >    ECHO:  Pending    VITALS:  T(C): 36.9 (06-02-24 @ 05:42), Max: 36.9 (06-02-24 @ 05:42)  T(F): 98.4 (06-02-24 @ 05:42), Max: 98.4 (06-02-24 @ 05:42)  HR: 70 (06-02-24 @ 10:04) (70 - 83)  BP: 131/101 (06-02-24 @ 05:42) (131/101 - 157/78)  BP(mean): 104 (06-01-24 @ 20:50) (102 - 119)  ABP: --  ABP(mean): --  RR: 20 (06-02-24 @ 05:42) (15 - 21)  SpO2: 97% (06-02-24 @ 10:04) (97% - 99%)  CVP(mm Hg): --  CVP(cm H2O): --    Ins and Outs     06-01-24 @ 07:01  -  06-02-24 @ 07:00  --------------------------------------------------------  IN: 0 mL / OUT: 600 mL / NET: -600 mL    06-02-24 @ 07:01  -  06-02-24 @ 12:03  --------------------------------------------------------  IN: 0 mL / OUT: 150 mL / NET: -150 mL        Height (cm): 175.3 (06-01-24 @ 13:07)  Weight (kg): 83.9 (06-01-24 @ 13:07)  BMI (kg/m2): 27.3 (06-01-24 @ 13:07)        I&O's Detail    01 Jun 2024 07:01  -  02 Jun 2024 07:00  --------------------------------------------------------  IN:  Total IN: 0 mL    OUT:    Voided (mL): 600 mL  Total OUT: 600 mL    Total NET: -600 mL      02 Jun 2024 07:01  -  02 Jun 2024 12:03  --------------------------------------------------------  IN:  Total IN: 0 mL    OUT:    Voided (mL): 150 mL  Total OUT: 150 mL    Total NET: -150 mL     Physical Examination:  GENERAL:               Alert, Oriented, No acute distress.    HEENT:                    No JVD, Dry MM  PULM:                     Bilateral air entry, Clear to auscultation bilaterally, no significant sputum production, bibasilarRales, No Rhonchi, No Wheezing  CVS:                         S1, S2,  + Murmur  ABD:                        Soft, nondistended, nontender, normoactive bowel sounds,   EXT:                         Chronic LE venous stasis and edema, nontender, No Cyanosis or Clubbing   Vascular:                Warm Extremities, Normal Capillary refill, Normal Distal Pulses  NEURO:                  Alert, oriented, interactive, nonfocal, follows commands  PSYC:                      Calm, + Insight.

## 2024-06-02 NOTE — PHYSICAL THERAPY INITIAL EVALUATION ADULT - ADDITIONAL COMMENTS
pt lives in [facility]. per daughter 2 weeks ago pt was amb no device.  independent with ADL's

## 2024-06-02 NOTE — PROGRESS NOTE ADULT - CONVERSATION DETAILS
Advance directives discussed with patient and Daughters at bedside. Daughter Diana present at bedside. Discussed GOC including CPR and intubation. Patient does to wish to pursue aggressive measures. Does not want CPR/intubation. Wants to be DNR. Family present for this discussion. MOLST filled and placed in chart.

## 2024-06-02 NOTE — CHART NOTE - NSCHARTNOTEFT_GEN_A_CORE
Notified by RN. That Pt has prelim BCx growing gram + cocci in pairs. Pt already on antibiotics, including Ceftriaxone.

## 2024-06-02 NOTE — CONSULT NOTE ADULT - ASSESSMENT
Assessment:  Richard Davila is a 89 year old man with past medical history of Coronary artery disease (s/p CABG & PCIs), TAVR, Permanent pacemaker, Hypertension, Chronic kidney disease, Prostate cancer (s/p surgery) brought in by EMS from assisted living facility due to progressive dyspnea and leg swelling, found to have signs of congestive heart failure.    ECG consistent with ventricular paced rhythm. Troponins negative x 2. CT chest consistent with bilateral lung opacities and small loculated and nodular bilateral pleural effusions. Pro BNP elevated, Cr elevated. Leg US negative for DVT.      Recommendations:  [] PPM interrogation  Assessment:  Richard Davila is a 89 year old man with past medical history of Coronary artery disease (s/p CABG & PCIs), HFrEF (LVEF 30%), Atrial fibrillation (on Xarelto), TAVR, Permanent pacemaker, Hypertension, Chronic kidney disease, Prostate cancer (s/p surgery) brought in by EMS from assisted living facility due to progressive dyspnea and leg swelling, found to have signs of congestive heart failure.    ECG consistent with ventricular paced rhythm. Troponins negative x 2. CT chest consistent with bilateral lung opacities and small loculated and nodular bilateral pleural effusions. Pro BNP elevated, Cr elevated. Leg US negative for DVT.    Recommendations:  [] HFrEF: Family at bedside have notes from recent cardiologist office, Dr. Richard Avitia at [facility] which indicates patient has LVEF of 30%. He was on Torsemide 20 mg PO daily. Continue Lasix 40 mg IVP daily with close monitoring of renal function. Check echo, LVEF will determine guideline directed medical therapy, likely will benefit from Entresto if renal function stabilizes, continue Metoprolol succinate. Recommend Kairos4 PPM interrogation (tele tech made aware).    [] Atrial fibrillation: Continue Xarelto for stroke prophylaxis  [] CAD s/p CABG and PCI: Appears stable. Continue Plavix and high intensity statin  [] Possible pneumonia, allergies: Follow up Pulmonary, patient receiving antibiotics and Claritin    Discussed with patient and his daughters at bedside. We will continue to follow along.    Natasha Bains MD  Cardiology

## 2024-06-02 NOTE — CONSULT NOTE ADULT - ASSESSMENT
Assessment  89 year old male with history of lung cancer (unsure weather right or left) s/p ? resection denies chemo and unsure of radiation, non smoker, denies h/o COPD/Asthma, HTN, CAD with pacemaker s/p cabg, PVD, T2DM, essential tremor, hx prostate cancer, presenting from [facility] for concern of shortness of breath/GRANADOS      Problem List  1. Dyspnea suspect cardiogenic causes,   2. Chronic cough, possible from loculated pl effusions vs seasonal allergies  3. Abnormal CT chest with -       Ill-defined patchy and clustered bilateral lung opacities.       Left lower lobe subpleural 4 mm nodule versus intrapulmonary lymph node.       Right lung postsurgical changes.       Small loculated and nodular bilateral pleural effusions      Ill-defined patchy and clustered bilateral lung opacities.     Plan  Cardio workup for cause of SOB  empiric antibiotics ill defined patchy infiltrates  Loculated effusion on right could be post surgical and chronic, requested family to obtain old imaging  left lower lobe subpleural 4 mm nodule can consider repeat CT chest in 6-12 months if desire for cancer workup desired  Start Anna vazquez prn  will follow  d/w dtr cassi

## 2024-06-02 NOTE — CONSULT NOTE ADULT - SUBJECTIVE AND OBJECTIVE BOX
NEPHROLOGY CONSULTATION    CHIEF COMPLAINT: SOB    HPI:  Pt is 89 yo M with PMH HTN, CAD, pacemaker, PVD, T2 DM, essential tremor, hx prostate cancer, presenting from [facility] for concern of shortness of breath for the past few weeks associated w/swelling of b/l LE. No fever, N/V, diarrhea, chest pain, abdominal pain, headache. Noted to have abnormal renal fx.    ROS:  as above    Allergies:  No Known Allergies    PAST MEDICAL & SURGICAL HISTORY:  Pacemaker  CAD (coronary artery disease)  HTN (hypertension)  Diabetes    SOCIAL HISTORY:  negative    FAMILY HISTORY:  NC    MEDICATIONS  (STANDING):  albuterol/ipratropium for Nebulization 3 milliLiter(s) Nebulizer every 6 hours  atorvastatin 40 milliGRAM(s) Oral at bedtime  cefTRIAXone   IVPB 1000 milliGRAM(s) IV Intermittent every 24 hours  clopidogrel Tablet 75 milliGRAM(s) Oral daily  dextrose 10% Bolus 125 milliLiter(s) IV Bolus once  dextrose 5%. 1000 milliLiter(s) (100 mL/Hr) IV Continuous <Continuous>  dextrose 5%. 1000 milliLiter(s) (50 mL/Hr) IV Continuous <Continuous>  dextrose 50% Injectable 25 Gram(s) IV Push once  dextrose 50% Injectable 12.5 Gram(s) IV Push once  doxazosin 2 milliGRAM(s) Oral daily  doxycycline IVPB      doxycycline IVPB 100 milliGRAM(s) IV Intermittent every 12 hours  furosemide   Injectable 40 milliGRAM(s) IV Push daily  glucagon  Injectable 1 milliGRAM(s) IntraMuscular once  heparin   Injectable 5000 Unit(s) SubCutaneous every 8 hours  insulin lispro (ADMELOG) corrective regimen sliding scale   SubCutaneous three times a day before meals  insulin lispro (ADMELOG) corrective regimen sliding scale   SubCutaneous at bedtime  latanoprost 0.005% Ophthalmic Solution 1 Drop(s) Both EYES at bedtime  loratadine 10 milliGRAM(s) Oral daily  metoprolol succinate ER 50 milliGRAM(s) Oral daily  multivitamin 1 Tablet(s) Oral daily  potassium chloride    Tablet ER 20 milliEquivalent(s) Oral daily    Home Medications:  atorvastatin 80 mg oral tablet: 0.5 tab(s) orally once a day (at bedtime) (01 Jun 2024 20:55)  doxazosin 2 mg oral tablet: 1 tab(s) orally once a day (01 Jun 2024 20:54)  furosemide 20 mg oral tablet: 2 tab(s) orally once a day (01 Jun 2024 20:54)  latanoprost 0.005% ophthalmic solution: 1 drop(s) in each eye once a day (at bedtime) (01 Jun 2024 20:55)  metFORMIN 500 mg oral tablet: 1 tab(s) orally once a day (01 Jun 2024 20:55)  metoprolol succinate 100 mg oral tablet, extended release: 0.5 tab(s) orally once a day (01 Jun 2024 20:55)  Multiple Vitamins oral tablet: 1 tab(s) orally once a day (01 Jun 2024 21:05)  Plavix 75 mg oral tablet: 1 tab(s) orally once a day (01 Jun 2024 20:54)  potassium chloride 10 mEq oral capsule, extended release: 2 cap(s) orally once a day (01 Jun 2024 20:55)  PreserVision AREDS 2 oral capsule: 1 cap(s) orally once a day (01 Jun 2024 20:55)    Vital Signs Last 24 Hrs  T(C): 36.9 (06-02-24 @ 05:42), Max: 36.9 (06-02-24 @ 05:42)  T(F): 98.4 (06-02-24 @ 05:42), Max: 98.4 (06-02-24 @ 05:42)  HR: 70 (06-02-24 @ 10:04) (70 - 83)  BP: 131/101 (06-02-24 @ 05:42) (131/101 - 157/78)  BP(mean): 104 (06-01-24 @ 20:50) (102 - 119)  RR: 20 (06-02-24 @ 05:42) (15 - 21)  SpO2: 97% (06-02-24 @ 10:04) (97% - 99%)    I&O's Detail    01 Jun 2024 07:01  -  02 Jun 2024 07:00  --------------------------------------------------------  OUT:    Voided (mL): 600 mL  Total OUT: 600 mL    02 Jun 2024 07:01 - 02 Jun 2024 10:46  --------------------------------------------------------  OUT:    Voided (mL): 150 mL  Total OUT: 150 mL    LABS:                        10.9   6.89  )-----------( 148      ( 02 Jun 2024 06:00 )             33.8     06-02    139  |  103  |  40<H>  ----------------------------<  159<H>  4.0   |  27  |  1.57<H>    Ca    8.8      02 Jun 2024 06:00  Phos  3.4     06-02  Mg     1.8     06-02    TPro  7.2  /  Alb  2.9<L>  /  TBili  1.1  /  DBili  x   /  AST  21  /  ALT  22  /  AlkPhos  169<H>  06-02    LIVER FUNCTIONS - ( 02 Jun 2024 06:00 )  Alb: 2.9 g/dL / Pro: 7.2 g/dL / ALK PHOS: 169 U/L / ALT: 22 U/L / AST: 21 U/L / GGT: x           PT/INR - ( 01 Jun 2024 13:50 )   PT: 15.7 sec;   INR: 1.39 ratio       PTT - ( 01 Jun 2024 13:50 )  PTT:27.1 sec    A/P:    full consult to follow    [phone]     NEPHROLOGY CONSULTATION    CHIEF COMPLAINT: SOB    HPI:  Pt is 89 yo M with PMH HTN, CAD, pacemaker, PVD, T2 DM, essential tremor, hx prostate cancer, presenting from [facility] for concern of shortness of breath for the past few weeks associated w/swelling of b/l LE. No N/V, diarrhea, chest pain, abdominal pain, headache. Noted to have abnormal renal fx.    ROS:  as above    Allergies:  No Known Allergies    PAST MEDICAL & SURGICAL HISTORY:  Pacemaker  CAD (coronary artery disease)  HTN (hypertension)  Diabetes    SOCIAL HISTORY:  negative    FAMILY HISTORY:  NC    MEDICATIONS  (STANDING):  albuterol/ipratropium for Nebulization 3 milliLiter(s) Nebulizer every 6 hours  atorvastatin 40 milliGRAM(s) Oral at bedtime  cefTRIAXone   IVPB 1000 milliGRAM(s) IV Intermittent every 24 hours  clopidogrel Tablet 75 milliGRAM(s) Oral daily  dextrose 10% Bolus 125 milliLiter(s) IV Bolus once  dextrose 5%. 1000 milliLiter(s) (100 mL/Hr) IV Continuous <Continuous>  dextrose 5%. 1000 milliLiter(s) (50 mL/Hr) IV Continuous <Continuous>  dextrose 50% Injectable 25 Gram(s) IV Push once  dextrose 50% Injectable 12.5 Gram(s) IV Push once  doxazosin 2 milliGRAM(s) Oral daily  doxycycline IVPB      doxycycline IVPB 100 milliGRAM(s) IV Intermittent every 12 hours  furosemide   Injectable 40 milliGRAM(s) IV Push daily  glucagon  Injectable 1 milliGRAM(s) IntraMuscular once  heparin   Injectable 5000 Unit(s) SubCutaneous every 8 hours  insulin lispro (ADMELOG) corrective regimen sliding scale   SubCutaneous three times a day before meals  insulin lispro (ADMELOG) corrective regimen sliding scale   SubCutaneous at bedtime  latanoprost 0.005% Ophthalmic Solution 1 Drop(s) Both EYES at bedtime  loratadine 10 milliGRAM(s) Oral daily  metoprolol succinate ER 50 milliGRAM(s) Oral daily  multivitamin 1 Tablet(s) Oral daily  potassium chloride    Tablet ER 20 milliEquivalent(s) Oral daily    Home Medications:  atorvastatin 80 mg oral tablet: 0.5 tab(s) orally once a day (at bedtime) (01 Jun 2024 20:55)  doxazosin 2 mg oral tablet: 1 tab(s) orally once a day (01 Jun 2024 20:54)  furosemide 20 mg oral tablet: 2 tab(s) orally once a day (01 Jun 2024 20:54)  latanoprost 0.005% ophthalmic solution: 1 drop(s) in each eye once a day (at bedtime) (01 Jun 2024 20:55)  metFORMIN 500 mg oral tablet: 1 tab(s) orally once a day (01 Jun 2024 20:55)  metoprolol succinate 100 mg oral tablet, extended release: 0.5 tab(s) orally once a day (01 Jun 2024 20:55)  Multiple Vitamins oral tablet: 1 tab(s) orally once a day (01 Jun 2024 21:05)  Plavix 75 mg oral tablet: 1 tab(s) orally once a day (01 Jun 2024 20:54)  potassium chloride 10 mEq oral capsule, extended release: 2 cap(s) orally once a day (01 Jun 2024 20:55)  PreserVision AREDS 2 oral capsule: 1 cap(s) orally once a day (01 Jun 2024 20:55)    Vital Signs Last 24 Hrs  T(C): 36.9 (06-02-24 @ 05:42), Max: 36.9 (06-02-24 @ 05:42)  T(F): 98.4 (06-02-24 @ 05:42), Max: 98.4 (06-02-24 @ 05:42)  HR: 70 (06-02-24 @ 10:04) (70 - 83)  BP: 131/101 (06-02-24 @ 05:42) (131/101 - 157/78)  BP(mean): 104 (06-01-24 @ 20:50) (102 - 119)  RR: 20 (06-02-24 @ 05:42) (15 - 21)  SpO2: 97% (06-02-24 @ 10:04) (97% - 99%)    I&O's Detail    01 Jun 2024 07:01  -  02 Jun 2024 07:00  --------------------------------------------------------  OUT:    Voided (mL): 600 mL  Total OUT: 600 mL    02 Jun 2024 07:01  -  02 Jun 2024 10:46  --------------------------------------------------------  OUT:    Voided (mL): 150 mL  Total OUT: 150 mL    s1s2  b/l air entry  soft, ND  + edema    LABS:                        10.9   6.89  )-----------( 148      ( 02 Jun 2024 06:00 )             33.8     06-02    139  |  103  |  40<H>  ----------------------------<  159<H>  4.0   |  27  |  1.57<H>    Ca    8.8      02 Jun 2024 06:00  Phos  3.4     06-02  Mg     1.8     06-02    TPro  7.2  /  Alb  2.9<L>  /  TBili  1.1  /  DBili  x   /  AST  21  /  ALT  22  /  AlkPhos  169<H>  06-02    LIVER FUNCTIONS - ( 02 Jun 2024 06:00 )  Alb: 2.9 g/dL / Pro: 7.2 g/dL / ALK PHOS: 169 U/L / ALT: 22 U/L / AST: 21 U/L / GGT: x           PT/INR - ( 01 Jun 2024 13:50 )   PT: 15.7 sec;   INR: 1.39 ratio       PTT - ( 01 Jun 2024 13:50 )  PTT:27.1 sec    A/P:    Adm w/edema, SOB  Baseline Cr unknown  Overall stable renal indices  No objection to diuresis  SONO w/o hydro  Check UA  Avoid nephrotoxins   F/u BMP, UO    [phone]

## 2024-06-02 NOTE — PROGRESS NOTE ADULT - SUBJECTIVE AND OBJECTIVE BOX
Patient is a 89y old  Male who presents with a chief complaint of SOB (02 Jun 2024 10:43)    SUBJECTIVE / OVERNIGHT EVENTS: Patient seen and examined. Reports unable to urinate since yesterday evening. He says that he has a urinary device that he has to press to urinate however unable to find the device today in his groin. He says it was placed years ago in [place]. He feels pressure in the abdomen and has urgency to urinate.     He also reports SOB is chronic but maybe worse over the last few weeks. Also reports LE edema for last 6 weeks. He has cough which he attributes to allergies.     Reports his daughter Diana is his HCP    MEDICATIONS  (STANDING):  albuterol/ipratropium for Nebulization 3 milliLiter(s) Nebulizer every 6 hours  atorvastatin 40 milliGRAM(s) Oral at bedtime  cefTRIAXone   IVPB 1000 milliGRAM(s) IV Intermittent every 24 hours  clopidogrel Tablet 75 milliGRAM(s) Oral daily  dextrose 10% Bolus 125 milliLiter(s) IV Bolus once  dextrose 5%. 1000 milliLiter(s) (100 mL/Hr) IV Continuous <Continuous>  dextrose 5%. 1000 milliLiter(s) (50 mL/Hr) IV Continuous <Continuous>  dextrose 50% Injectable 25 Gram(s) IV Push once  dextrose 50% Injectable 12.5 Gram(s) IV Push once  doxazosin 2 milliGRAM(s) Oral daily  doxycycline IVPB      doxycycline IVPB 100 milliGRAM(s) IV Intermittent every 12 hours  furosemide   Injectable 40 milliGRAM(s) IV Push daily  glucagon  Injectable 1 milliGRAM(s) IntraMuscular once  heparin   Injectable 5000 Unit(s) SubCutaneous every 8 hours  insulin lispro (ADMELOG) corrective regimen sliding scale   SubCutaneous three times a day before meals  insulin lispro (ADMELOG) corrective regimen sliding scale   SubCutaneous at bedtime  latanoprost 0.005% Ophthalmic Solution 1 Drop(s) Both EYES at bedtime  loratadine 10 milliGRAM(s) Oral daily  metoprolol succinate ER 50 milliGRAM(s) Oral daily  multivitamin 1 Tablet(s) Oral daily  potassium chloride    Tablet ER 20 milliEquivalent(s) Oral daily    MEDICATIONS  (PRN):  acetaminophen     Tablet .. 650 milliGRAM(s) Oral every 6 hours PRN Temp greater or equal to 38C (100.4F), Mild Pain (1 - 3)  aluminum hydroxide/magnesium hydroxide/simethicone Suspension 30 milliLiter(s) Oral every 4 hours PRN Dyspepsia  dextrose Oral Gel 15 Gram(s) Oral once PRN Blood Glucose LESS THAN 70 milliGRAM(s)/deciliter  melatonin 3 milliGRAM(s) Oral at bedtime PRN Insomnia  ondansetron Injectable 4 milliGRAM(s) IV Push every 8 hours PRN Nausea and/or Vomiting    CAPILLARY BLOOD GLUCOSE  POCT Blood Glucose.: 147 mg/dL (02 Jun 2024 08:10)  POCT Blood Glucose.: 191 mg/dL (01 Jun 2024 23:14)  POCT Blood Glucose.: 200 mg/dL (01 Jun 2024 22:23)    I&O's Summary    01 Jun 2024 07:01  -  02 Jun 2024 07:00  --------------------------------------------------------  IN: 0 mL / OUT: 600 mL / NET: -600 mL    02 Jun 2024 07:01  -  02 Jun 2024 11:04  --------------------------------------------------------  IN: 0 mL / OUT: 150 mL / NET: -150 mL    Vital Signs Last 24 Hrs  T(C): 36.9 (02 Jun 2024 05:42), Max: 36.9 (02 Jun 2024 05:42)  T(F): 98.4 (02 Jun 2024 05:42), Max: 98.4 (02 Jun 2024 05:42)  HR: 70 (02 Jun 2024 10:04) (70 - 83)  BP: 131/101 (02 Jun 2024 05:42) (131/101 - 157/78)  BP(mean): 104 (01 Jun 2024 20:50) (102 - 119)  RR: 20 (02 Jun 2024 05:42) (15 - 21)  SpO2: 97% (02 Jun 2024 10:04) (97% - 99%)    Parameters below as of 02 Jun 2024 10:04  Patient On (Oxygen Delivery Method): room air      PHYSICAL EXAM:  GENERAL: NAD, well-developed  HEAD:  Atraumatic, Normocephalic  EYES: EOMI, PERRLA, conjunctiva and sclera clear  NECK: Supple, No JVD  CHEST/LUNG: decreased BS bilateral bases  HEART: Regular rate and rhythm; No murmurs, rubs, or gallops  ABDOMEN: Soft, Nontender, Nondistended; Bowel sounds present  EXTREMITIES:  2+ edema, chronic venous changes, dry skin   PSYCH: AAOx3  NEUROLOGY: non-focal  SKIN: No rashes or lesions   - pump palpated in scrotum     LABS:                        10.9   6.89  )-----------( 148      ( 02 Jun 2024 06:00 )             33.8     06-02    139  |  103  |  40<H>  ----------------------------<  159<H>  4.0   |  27  |  1.57<H>    Ca    8.8      02 Jun 2024 06:00  Phos  3.4     06-02  Mg     1.8     06-02    TPro  7.2  /  Alb  2.9<L>  /  TBili  1.1  /  DBili  x   /  AST  21  /  ALT  22  /  AlkPhos  169<H>  06-02    PT/INR - ( 01 Jun 2024 13:50 )   PT: 15.7 sec;   INR: 1.39 ratio         PTT - ( 01 Jun 2024 13:50 )  PTT:27.1 sec      Urinalysis Basic - ( 02 Jun 2024 06:00 )    Color: x / Appearance: x / SG: x / pH: x  Gluc: 159 mg/dL / Ketone: x  / Bili: x / Urobili: x   Blood: x / Protein: x / Nitrite: x   Leuk Esterase: x / RBC: x / WBC x   Sq Epi: x / Non Sq Epi: x / Bacteria: x        RADIOLOGY & ADDITIONAL TESTS:    < from: CT Chest No Cont (06.01.24 @ 22:58) >  IMPRESSION:  Ill-defined patchy and clustered bilateral lung opacities.    Right-sided pleural thickening. Small loculated and nodular bilateral   pleural effusions.    < end of copied text >      Imaging Personally Reviewed:    Consultant(s) Notes Reviewed:      Care Discussed with Consultants/Other Providers: Spoke to Dr. Omalley from     Assessment and Plan:

## 2024-06-02 NOTE — CONSULT NOTE ADULT - SUBJECTIVE AND OBJECTIVE BOX
DIANA DAVILA  [id number]      HPI:    Diana Davila is a 89 year old man with past medical history of Coronary artery disease (s/p CABG & PCIs), TAVR, Permanent pacemaker, Hypertension, Chronic kidney disease, Prostate cancer (s/p surgery) brought in by EMS from assisted living facility due to progressive dyspnea and leg swelling.       ALLERGIES:  No Known Allergies    CURRENT MEDICATIONS:  acetaminophen     Tablet .. 650 milliGRAM(s) Oral every 6 hours PRN  albuterol/ipratropium for Nebulization 3 milliLiter(s) Nebulizer every 6 hours  aluminum hydroxide/magnesium hydroxide/simethicone Suspension 30 milliLiter(s) Oral every 4 hours PRN  atorvastatin 40 milliGRAM(s) Oral at bedtime  cefTRIAXone   IVPB 1000 milliGRAM(s) IV Intermittent every 24 hours  clopidogrel Tablet 75 milliGRAM(s) Oral daily  dextrose 10% Bolus 125 milliLiter(s) IV Bolus once  dextrose 5%. 1000 milliLiter(s) IV Continuous <Continuous>  dextrose 5%. 1000 milliLiter(s) IV Continuous <Continuous>  dextrose 50% Injectable 25 Gram(s) IV Push once  dextrose 50% Injectable 12.5 Gram(s) IV Push once  dextrose Oral Gel 15 Gram(s) Oral once PRN  doxazosin 2 milliGRAM(s) Oral daily  doxycycline IVPB      doxycycline IVPB 100 milliGRAM(s) IV Intermittent every 12 hours  furosemide   Injectable 40 milliGRAM(s) IV Push daily  glucagon  Injectable 1 milliGRAM(s) IntraMuscular once  heparin   Injectable 5000 Unit(s) SubCutaneous every 8 hours  insulin lispro (ADMELOG) corrective regimen sliding scale   SubCutaneous three times a day before meals  insulin lispro (ADMELOG) corrective regimen sliding scale   SubCutaneous at bedtime  latanoprost 0.005% Ophthalmic Solution 1 Drop(s) Both EYES at bedtime  loratadine 10 milliGRAM(s) Oral daily  melatonin 3 milliGRAM(s) Oral at bedtime PRN  metoprolol succinate ER 50 milliGRAM(s) Oral daily  multivitamin 1 Tablet(s) Oral daily  ondansetron Injectable 4 milliGRAM(s) IV Push every 8 hours PRN    ROS:  All 10 systems reviewed and positives noted in HPI    OBJECTIVE:    VITAL SIGNS:  Vital Signs Last 24 Hrs  T(C): 36.9 (02 Jun 2024 05:42), Max: 36.9 (02 Jun 2024 05:42)  T(F): 98.4 (02 Jun 2024 05:42), Max: 98.4 (02 Jun 2024 05:42)  HR: 70 (02 Jun 2024 10:04) (70 - 83)  BP: 131/101 (02 Jun 2024 05:42) (131/101 - 157/78)  BP(mean): 104 (01 Jun 2024 20:50) (102 - 119)  RR: 20 (02 Jun 2024 05:42) (15 - 21)  SpO2: 97% (02 Jun 2024 10:04) (97% - 99%)    Parameters below as of 02 Jun 2024 10:04  Patient On (Oxygen Delivery Method): room air        PHYSICAL EXAM:  General: well appearing, no distress  HEENT: sclera anicteric  Neck: supple, no carotid bruits b/l  CVS: JVP ~ 7 cm H20, RRR, s1, s2, no murmurs/rubs/gallops  Chest: unlabored respirations, clear to auscultation b/l  Abdomen: non-distended  Extremities: no lower extremity edema b/l  Neuro: awake, alert & oriented x 3  Psych: normal affect      LABS:                        10.9   6.89  )-----------( 148      ( 02 Jun 2024 06:00 )             33.8     06-02    139  |  103  |  40<H>  ----------------------------<  159<H>  4.0   |  27  |  1.57<H>    Ca    8.8      02 Jun 2024 06:00  Phos  3.4     06-02  Mg     1.8     06-02    TPro  7.2  /  Alb  2.9<L>  /  TBili  1.1  /  DBili  x   /  AST  21  /  ALT  22  /  AlkPhos  169<H>  06-02        PT/INR - ( 01 Jun 2024 13:50 )   PT: 15.7 sec;   INR: 1.39 ratio         PTT - ( 01 Jun 2024 13:50 )  PTT:27.1 sec      TTE Report from [place] (2021):  LVEF 55-60%  Normal RV systolic function  S/p TAVR, normally functioning   Mild MR, Mild TR  No pericardial effusion       ECG (6/1/24): ventricular paced     DIANA DAVILA  [id number]      HPI:    Diana Davila is a 89 year old man with past medical history of Coronary artery disease (s/p CABG & PCIs), HFrEF (LVEF 30%), Atrial fibrillation (on Xarelto), TAVR, Permanent pacemaker, Hypertension, Chronic kidney disease, Prostate cancer (s/p surgery) brought in by EMS from assisted living facility due to progressive dyspnea and leg swelling.       ALLERGIES:  No Known Allergies    CURRENT MEDICATIONS:  acetaminophen     Tablet .. 650 milliGRAM(s) Oral every 6 hours PRN  albuterol/ipratropium for Nebulization 3 milliLiter(s) Nebulizer every 6 hours  aluminum hydroxide/magnesium hydroxide/simethicone Suspension 30 milliLiter(s) Oral every 4 hours PRN  atorvastatin 40 milliGRAM(s) Oral at bedtime  cefTRIAXone   IVPB 1000 milliGRAM(s) IV Intermittent every 24 hours  clopidogrel Tablet 75 milliGRAM(s) Oral daily  dextrose 10% Bolus 125 milliLiter(s) IV Bolus once  dextrose 5%. 1000 milliLiter(s) IV Continuous <Continuous>  dextrose 5%. 1000 milliLiter(s) IV Continuous <Continuous>  dextrose 50% Injectable 25 Gram(s) IV Push once  dextrose 50% Injectable 12.5 Gram(s) IV Push once  dextrose Oral Gel 15 Gram(s) Oral once PRN  doxazosin 2 milliGRAM(s) Oral daily  doxycycline IVPB      doxycycline IVPB 100 milliGRAM(s) IV Intermittent every 12 hours  furosemide   Injectable 40 milliGRAM(s) IV Push daily  glucagon  Injectable 1 milliGRAM(s) IntraMuscular once  heparin   Injectable 5000 Unit(s) SubCutaneous every 8 hours  insulin lispro (ADMELOG) corrective regimen sliding scale   SubCutaneous three times a day before meals  insulin lispro (ADMELOG) corrective regimen sliding scale   SubCutaneous at bedtime  latanoprost 0.005% Ophthalmic Solution 1 Drop(s) Both EYES at bedtime  loratadine 10 milliGRAM(s) Oral daily  melatonin 3 milliGRAM(s) Oral at bedtime PRN  metoprolol succinate ER 50 milliGRAM(s) Oral daily  multivitamin 1 Tablet(s) Oral daily  ondansetron Injectable 4 milliGRAM(s) IV Push every 8 hours PRN    ROS:  All 10 systems reviewed and positives noted in HPI    OBJECTIVE:    VITAL SIGNS:  Vital Signs Last 24 Hrs  T(C): 36.9 (02 Jun 2024 05:42), Max: 36.9 (02 Jun 2024 05:42)  T(F): 98.4 (02 Jun 2024 05:42), Max: 98.4 (02 Jun 2024 05:42)  HR: 70 (02 Jun 2024 10:04) (70 - 83)  BP: 131/101 (02 Jun 2024 05:42) (131/101 - 157/78)  BP(mean): 104 (01 Jun 2024 20:50) (102 - 119)  RR: 20 (02 Jun 2024 05:42) (15 - 21)  SpO2: 97% (02 Jun 2024 10:04) (97% - 99%)    Parameters below as of 02 Jun 2024 10:04  Patient On (Oxygen Delivery Method): room air        PHYSICAL EXAM:  General: no distress  HEENT: sclera anicteric  Neck: supple  CVS: JVP ~ 10 cm H20, irregularly irregular, s1, s2  Chest: unlabored respirations, crackles  Abdomen: non-distended  Extremities: chronic lower extremity venous skin changes  Neuro: awake, alert & oriented  Psych: normal affect      LABS:                        10.9   6.89  )-----------( 148      ( 02 Jun 2024 06:00 )             33.8     06-02    139  |  103  |  40<H>  ----------------------------<  159<H>  4.0   |  27  |  1.57<H>    Ca    8.8      02 Jun 2024 06:00  Phos  3.4     06-02  Mg     1.8     06-02    TPro  7.2  /  Alb  2.9<L>  /  TBili  1.1  /  DBili  x   /  AST  21  /  ALT  22  /  AlkPhos  169<H>  06-02        PT/INR - ( 01 Jun 2024 13:50 )   PT: 15.7 sec;   INR: 1.39 ratio         PTT - ( 01 Jun 2024 13:50 )  PTT:27.1 sec      TTE Report from [place] (2021):  LVEF 55-60%  Normal RV systolic function  S/p TAVR, normally functioning   Mild MR, Mild TR  No pericardial effusion       ECG (6/1/24): ventricular paced

## 2024-06-02 NOTE — CHART NOTE - NSCHARTNOTEFT_GEN_A_CORE
Pt seen with Urologist,   89 yo M with PMH HTN, CAD, pacemaker, PVD, T2 DM, essential tremor, hx prostate cancer, presenting from [facility] for concern of shortness of breath for the past few weeks associated w/swelling of b/l LE.  Urology called for pt with urinary retention, Pt states he had a urinary sphincter device placed 30 years ago in [place].  Urologist  manipulated device and currently urine flowing, Output total of 800cc  Advised RN if urinary retention a concern to have pt stand and he should be able to located the device.   will continue to follow

## 2024-06-02 NOTE — PROGRESS NOTE ADULT - ASSESSMENT
89-year-old male with PMHx HTN, CAD with pacemaker, PVD, T2DM, essential tremor, hx prostate cancer, presented from [facility] for concern of shortness of breath for the past 6-8 weeks and associated b/l swelling for the past 1.5 months, admited for CHF exacerbation PNA, and r/o PE.    #Acute Urinary retention  -developed overnight  -bladder scan with 464 volume   -has urinary device, attempted to de-activate as per urologist direction - only 100cc output noted  -Formal urology evaluation requested to ensure device is functioning to avoid worsening of retention    # Suspect CHF exacerbation, no prior formal diagnosis  -worsening SOB, LE edema  -BNP 3408. Trop x1 neg  -EKG sinus with complete heart block, ventricular placed, .  -home dose is lasix 40 daily  -on lasix 20IVBID now  -cardio evaluation requested  -TTE pending  -strict I&Os q8, daily weights  -Age adjusted D-dimer WNL, not hypoxic or tachy, low suspicion for PE    #PNA, suspected community acquired   -ongoing cough, mostly dry  -CT chest prelim: patchy opacities b/l, small loculated effusion b/l   -rocephin and doxy  -pulmonary evaluation requested  -F/u urine legionella Ag  -Saturating well on RA, does not require 02 at this time     #ALVIN vs CKD3, baseline unknown  -urinary retention maybe causing ALVIN  -Cr 1.69, GFR 36  -renal US  -monitor renal function     #T2DM  -hold home metformin  -monitor FS with sliding scale     DVT ppx: Heparin  Diet: DASH, consistent carb  Code: Full   89-year-old male with PMHx HTN, CAD with pacemaker, PVD, T2DM, essential tremor, hx prostate cancer, presented from [facility] for concern of shortness of breath for the past 6-8 weeks and associated b/l swelling for the past 1.5 months, admited for CHF exacerbation PNA, and r/o PE.    #Acute Urinary retention  -developed overnight  -bladder scan with 464 volume   -has urinary device, attempted to de-activate as per urologist direction - only 100cc output noted  -Formal urology evaluation requested to ensure device is functioning to avoid worsening of retention    # Suspect CHF exacerbation, no prior formal diagnosis  -worsening SOB, LE edema  -BNP 3408. Trop x1 neg  -EKG sinus with complete heart block, ventricular placed, .  -home dose is lasix 40 daily  -on lasix 40IVdaily now  -cardio evaluation requested  -TTE pending  -strict I&Os q8, daily weights  -Age adjusted D-dimer WNL, not hypoxic or tachy, low suspicion for PE    #PNA, suspected community acquired   -ongoing cough, mostly dry  -CT chest prelim: patchy opacities b/l, small loculated effusion b/l   -rocephin and doxy  -pulmonary evaluation requested  -F/u urine legionella Ag  -Saturating well on RA, does not require 02 at this time     #ALVIN vs CKD3, baseline unknown  -urinary retention maybe causing ALVIN  -Cr 1.69, GFR 36  -renal US  -monitor renal function     #T2DM  -hold home metformin  -monitor FS with sliding scale     #h/o CAD  -c/w plavix, statin and BB    DVT ppx: Heparin  Diet: DASH, consistent carb  Code: Full   89-year-old male with PMHx HTN, CAD with pacemaker, PVD, T2DM, essential tremor, hx prostate cancer, presented from [facility] for concern of shortness of breath for the past 6-8 weeks and associated b/l swelling for the past 1.5 months, admited for CHF exacerbation PNA, and r/o PE.    #Acute Urinary retention  -developed overnight  -bladder scan with 464 volume   -has urinary device, attempted to de-activate as per urologist direction - only 100cc output noted  -Formal urology evaluation requested to ensure device is functioning to avoid worsening of retention    #HFrEF, EF 30%  -outpt records provided by family reviewed. Patient with EF 30%  -Recently seen by cardiologist Dr. Avitia who increased torsemide to 40mg, added xarelto 15mg and increased Metoprolol to 50BID  -worsening SOB, LE edema  -BNP 3408. Trop neg  -EKG sinus with complete heart block, ventricular placed, .  -on lasix 40IVdaily now  -cardio evaluation requested  -TTE pending  -strict I&Os q8, daily weights  -Age adjusted D-dimer WNL, not hypoxic or tachy, low suspicion for PE    #h/o lung ca  #chronic cough, dry   #CT with loculated effusion/patchy opacities   -rocephin and doxy to cover for infectious process  -loculated effusion likely chronic from lung Ca  -pulmonary evaluation appreciated. Will need outpt follow up  -F/u urine legionella Ag  -claritin, cough suppressant for seasonal allergies   -Saturating well on RA, does not require 02 at this time     #ALVIN vs CKD3, baseline unknown  -urinary retention maybe causing ALVIN  -Cr 1.69, GFR 36  -renal US  -monitor renal function     #T2DM  -hold home metformin  -monitor FS with sliding scale     #h/o CAD  -c/w plavix, statin and BB    DVT ppx: Heparin  Diet: DASH, consistent carb  Code: DNR    Plan discussed with family at bedside.   20 mins spent in discussing GOC, face to face, filling MOLST and documentation

## 2024-06-03 ENCOUNTER — RESULT REVIEW (OUTPATIENT)
Age: 89
End: 2024-06-03

## 2024-06-03 LAB
ANION GAP SERPL CALC-SCNC: 9 MMOL/L — SIGNIFICANT CHANGE UP (ref 5–17)
BUN SERPL-MCNC: 40 MG/DL — HIGH (ref 7–23)
CALCIUM SERPL-MCNC: 8.8 MG/DL — SIGNIFICANT CHANGE UP (ref 8.4–10.5)
CHLORIDE SERPL-SCNC: 103 MMOL/L — SIGNIFICANT CHANGE UP (ref 96–108)
CO2 SERPL-SCNC: 25 MMOL/L — SIGNIFICANT CHANGE UP (ref 22–31)
CREAT SERPL-MCNC: 1.56 MG/DL — HIGH (ref 0.5–1.3)
CULTURE RESULTS: ABNORMAL
EGFR: 40 ML/MIN/1.73M2 — LOW
GLUCOSE BLDC GLUCOMTR-MCNC: 144 MG/DL — HIGH (ref 70–99)
GLUCOSE BLDC GLUCOMTR-MCNC: 157 MG/DL — HIGH (ref 70–99)
GLUCOSE BLDC GLUCOMTR-MCNC: 187 MG/DL — HIGH (ref 70–99)
GLUCOSE BLDC GLUCOMTR-MCNC: 198 MG/DL — HIGH (ref 70–99)
GLUCOSE SERPL-MCNC: 138 MG/DL — HIGH (ref 70–99)
HCT VFR BLD CALC: 33.6 % — LOW (ref 39–50)
HGB BLD-MCNC: 10.9 G/DL — LOW (ref 13–17)
LEGIONELLA AG UR QL: NEGATIVE — SIGNIFICANT CHANGE UP
MCHC RBC-ENTMCNC: 24.8 PG — LOW (ref 27–34)
MCHC RBC-ENTMCNC: 32.4 GM/DL — SIGNIFICANT CHANGE UP (ref 32–36)
MCV RBC AUTO: 76.5 FL — LOW (ref 80–100)
NRBC # BLD: 0 /100 WBCS — SIGNIFICANT CHANGE UP (ref 0–0)
NT-PROBNP SERPL-SCNC: 3036 PG/ML — HIGH (ref 0–300)
ORGANISM # SPEC MICROSCOPIC CNT: ABNORMAL
ORGANISM # SPEC MICROSCOPIC CNT: SIGNIFICANT CHANGE UP
PLATELET # BLD AUTO: 145 K/UL — LOW (ref 150–400)
POTASSIUM SERPL-MCNC: 3.5 MMOL/L — SIGNIFICANT CHANGE UP (ref 3.5–5.3)
POTASSIUM SERPL-SCNC: 3.5 MMOL/L — SIGNIFICANT CHANGE UP (ref 3.5–5.3)
RBC # BLD: 4.39 M/UL — SIGNIFICANT CHANGE UP (ref 4.2–5.8)
RBC # FLD: 16.6 % — HIGH (ref 10.3–14.5)
SODIUM SERPL-SCNC: 137 MMOL/L — SIGNIFICANT CHANGE UP (ref 135–145)
SPECIMEN SOURCE: SIGNIFICANT CHANGE UP
WBC # BLD: 4.88 K/UL — SIGNIFICANT CHANGE UP (ref 3.8–10.5)
WBC # FLD AUTO: 4.88 K/UL — SIGNIFICANT CHANGE UP (ref 3.8–10.5)

## 2024-06-03 PROCEDURE — 99232 SBSQ HOSP IP/OBS MODERATE 35: CPT

## 2024-06-03 PROCEDURE — 93306 TTE W/DOPPLER COMPLETE: CPT | Mod: 26

## 2024-06-03 PROCEDURE — 99222 1ST HOSP IP/OBS MODERATE 55: CPT

## 2024-06-03 PROCEDURE — 99233 SBSQ HOSP IP/OBS HIGH 50: CPT

## 2024-06-03 RX ORDER — INSULIN LISPRO 100/ML
VIAL (ML) SUBCUTANEOUS
Refills: 0 | Status: DISCONTINUED | OUTPATIENT
Start: 2024-06-03 | End: 2024-06-07

## 2024-06-03 RX ORDER — INSULIN LISPRO 100/ML
VIAL (ML) SUBCUTANEOUS AT BEDTIME
Refills: 0 | Status: DISCONTINUED | OUTPATIENT
Start: 2024-06-03 | End: 2024-06-07

## 2024-06-03 RX ADMIN — BENZOCAINE AND MENTHOL 1 LOZENGE: 5; 1 LIQUID ORAL at 01:50

## 2024-06-03 RX ADMIN — Medication 1 TABLET(S): at 12:08

## 2024-06-03 RX ADMIN — Medication 1: at 12:07

## 2024-06-03 RX ADMIN — Medication 2: at 17:05

## 2024-06-03 RX ADMIN — Medication 50 MILLIGRAM(S): at 05:46

## 2024-06-03 RX ADMIN — ATORVASTATIN CALCIUM 40 MILLIGRAM(S): 80 TABLET, FILM COATED ORAL at 22:47

## 2024-06-03 RX ADMIN — Medication 2 MILLIGRAM(S): at 05:46

## 2024-06-03 RX ADMIN — RIVAROXABAN 15 MILLIGRAM(S): KIT at 17:05

## 2024-06-03 RX ADMIN — LATANOPROST 1 DROP(S): 0.05 SOLUTION/ DROPS OPHTHALMIC; TOPICAL at 22:47

## 2024-06-03 RX ADMIN — Medication 100 MILLIGRAM(S): at 05:46

## 2024-06-03 RX ADMIN — CLOPIDOGREL BISULFATE 75 MILLIGRAM(S): 75 TABLET, FILM COATED ORAL at 12:07

## 2024-06-03 RX ADMIN — LORATADINE 10 MILLIGRAM(S): 10 TABLET ORAL at 12:07

## 2024-06-03 RX ADMIN — Medication 40 MILLIGRAM(S): at 07:07

## 2024-06-03 NOTE — PROGRESS NOTE ADULT - ASSESSMENT
89-year-old male with PMHx HTN, CAD with pacemaker, PVD, T2DM, essential tremor, hx prostate cancer, presented from [facility] for concern of shortness of breath for the past 6-8 weeks and associated b/l swelling for the past 1.5 months, admited for CHF exacerbation PNA, and r/o PE.    #Acute Urinary retention  #Artifical urinary sphincter in place (AUS)  -improved  -has urinary device  -Formal urology evaluation requested to ensure device is functioning to avoid worsening of retention    #HFrEF, EF 30%  -outpt records provided by family reviewed. Patient with EF 30%  -Recently seen by cardiologist Dr. Avitia who increased torsemide to 40mg, added xarelto 15mg and increased Metoprolol to 50BID  -worsening SOB, LE edema improved  -BNP 3408. Trop neg  -EKG sinus with complete heart block, ventricular placed, .  -on lasix 40IVdaily now  -cardio recs:  Check echo, LVEF will determine guideline directed medical therapy, likely will benefit from Entresto if renal function stabilizes, continue Metoprolol succinate. Recommend [employer] PPM interrogation  -strict I&Os q8, daily weights  -Age adjusted D-dimer WNL, not hypoxic or tachy, low suspicion for PE    #h/o lung ca  #chronic cough, dry   #CT with loculated effusion/patchy opacities.   -rocephin and doxy to cover for infectious process  -loculated effusion likely chronic from lung Ca  -pulmonary evaluation appreciated. Will need outpt follow up.   -left lower lobe subpleural 4 mm nodule can consider repeat CT chest in 6-12 months if desire for cancer workup desired  -F/u urine legionella Ag  Claritin cough suppressant for seasonal allergies   -Saturating well on RA, does not require 02 at this time     #ALVIN vs CKD3, baseline unknown  -urinary retention maybe causing ALVIN  -renal US: no obstructive uropathy  -monitor renal function     #T2DM  -hold home metformin  -monitor FS with sliding scale     #h/o CAD  -c/w plavix, statin and BB    DVT ppx: Heparin  Diet: DASH, consistent carb  Code: DNR    Plan discussed with family at bedside.   Updated daughter Diana    89-year-old male with PMHx HTN, CAD with pacemaker, PVD, T2DM, essential tremor, hx prostate cancer, presented from [facility] for concern of shortness of breath for the past 6-8 weeks and associated b/l swelling for the past 1.5 months, admited for CHF exacerbation PNA, and r/o PE.    #Acute Urinary retention  #Artifical urinary sphincter in place (AUS)  -improved  -has urinary device  -Formal urology evaluation requested to ensure device is functioning to avoid worsening of retention    #HFrEF, EF 30%  -outpt records provided by family reviewed. Patient with EF 30%  -Recently seen by cardiologist Dr. Avitia who increased torsemide to 40mg, added xarelto 15mg and increased Metoprolol to 50BID  -worsening SOB, LE edema improved  -BNP 3408. Trop neg  -EKG sinus with complete heart block, ventricular placed, .  -on lasix 40IVdaily now  -cardio recs:  Check echo, LVEF will determine guideline directed medical therapy, likely will benefit from Entresto if renal function stabilizes, continue Metoprolol succinate. Recommend [employer] PPM interrogation  -strict I&Os q8, daily weights  -Age adjusted D-dimer WNL, not hypoxic or tachy, low suspicion for PE    #h/o lung ca  #chronic cough, dry   #CT with loculated effusion/patchy opacities.   -rocephin and doxy to cover for infectious process  -loculated effusion likely chronic from lung Ca  -pulmonary evaluation appreciated. Will need outpt follow up.   -left lower lobe subpleural 4 mm nodule can consider repeat CT chest in 6-12 months if desire for cancer workup desired  -F/u urine legionella Ag  Claritin cough suppressant for seasonal allergies   -Saturating well on RA, does not require 02 at this time   -BC x1 positive likely contaminant. Will repeat BC.     #ALVIN vs CKD3, baseline unknown  -urinary retention maybe causing ALVIN  -renal US: no obstructive uropathy  -monitor renal function     #T2DM  -hold home metformin  -monitor FS with moderate sliding scale     #h/o CAD  -c/w plavix, statin and BB    DVT ppx: Heparin  Diet: DASH, consistent carb  Code: DNR    Plan discussed with family at bedside.   Updated daughter Diana

## 2024-06-03 NOTE — OCCUPATIONAL THERAPY INITIAL EVALUATION ADULT - MD ORDER
MD orders received, chart reviewed, RN cleared pt for OT initial evaluation, Refer below for findings.

## 2024-06-03 NOTE — OCCUPATIONAL THERAPY INITIAL EVALUATION ADULT - LEVEL OF INDEPENDENCE: EATING, OT EVAL
Action Requested: Summary for Provider     []  Critical Lab, Recommendations Needed  [] Need Additional Advice  []   FYI    []   Need Orders  [] Need Medications Sent to Pharmacy  []  Other     SUMMARY:     Patient called stating she fell hard onto left wr
supervision

## 2024-06-03 NOTE — PROGRESS NOTE ADULT - ASSESSMENT
Physical Examination:  GENERAL:               Alert, Oriented, No acute distress.    HEENT:                    No JVD, Dry MM  PULM:                     Bilateral air entry, Clear to auscultation bilaterally, no significant sputum production, bibasilarRales, No Rhonchi, No Wheezing  CVS:                         S1, S2,  + Murmur  ABD:                        Soft, nondistended, nontender, normoactive bowel sounds,   EXT:                         Chronic LE venous stasis and edema, nontender, No Cyanosis or Clubbing   NEURO:                  Alert, oriented, interactive, nonfocal, follows commands  PSYC:                      Calm, + Insight.      Assessment  89 year old male with history of lung cancer (unsure weather right or left) s/p ? resection denies chemo and unsure of radiation, non smoker, denies h/o COPD/Asthma, HTN, CAD with pacemaker s/p cabg, PVD, T2DM, essential tremor, hx prostate cancer, presenting from [facility] for concern of shortness of breath/GRANADOS      Problem List  1. Dyspnea suspect cardiogenic causes,   2. Chronic cough, possible from loculated pl effusions vs seasonal allergies  3. Abnormal CT chest with -       Ill-defined patchy and clustered bilateral lung opacities.       Left lower lobe subpleural 4 mm nodule versus intrapulmonary lymph node.       Right lung postsurgical changes.       Small loculated and nodular bilateral pleural effusions      Ill-defined patchy and clustered bilateral lung opacities.     Plan  Cardio workup for cause of SOB, f/u echo results  empiric antibiotics ill defined patchy infiltrates  Loculated effusion on right could be post surgical and chronic, requested family to obtain old imaging  left lower lobe subpleural 4 mm nodule can consider repeat CT chest in 6-12 months if desire for cancer workup desired  Continue claritin  will add tessalon for cough  nebs prn  d/w Dr Escobar

## 2024-06-03 NOTE — PROGRESS NOTE ADULT - ASSESSMENT
Assessment:  Richard Davila is a 89 year old man with past medical history of Coronary artery disease (s/p CABG & PCIs), HFrEF (LVEF 30%), Atrial fibrillation (on Xarelto), TAVR, Permanent pacemaker, Hypertension, Chronic kidney disease, Prostate cancer (s/p surgery) brought in by EMS from assisted living facility due to progressive dyspnea and leg swelling, found to have signs of congestive heart failure.    ECG consistent with ventricular paced rhythm. Troponins negative x 2. CT chest consistent with bilateral lung opacities and small loculated and nodular bilateral pleural effusions. Pro BNP elevated, Cr elevated. Leg US negative for DVT.    Recommendations:  [] HFrEF: Family at bedside have notes from recent cardiologist office, Dr. Richard Avitia at [facility] which indicates patient has LVEF of 30%. He was on Torsemide 20 mg PO daily. Continue Lasix 40 mg IVP daily with close monitoring of renal function. Check echo, LVEF will determine guideline directed medical therapy, likely will benefit from Entresto if renal function stabilizes, continue Metoprolol succinate. Recommend [email or web address] PPM interrogation (tele tech made aware).    [] Atrial fibrillation: Continue Xarelto for stroke prophylaxis  [] CAD s/p CABG and PCI: Appears stable. Continue Plavix and high intensity statin  [] Pneumonia: Blood cultures now positive, follow up Pulmonary, patient receiving antibiotics    Discussed with patient and his daughters at bedside. We will continue to follow along.    Natasha Bains MD  Cardiology

## 2024-06-03 NOTE — CONSULT NOTE ADULT - ASSESSMENT
89 yo male with  artifical urinary sphincter in place (AUS) currently deactivated due to pts medical conditioning.   Renal sono with obstruction     will reactivate AUS once pt is medically optimized for discharge.       DO not attempt hansen placement in pt with AUS

## 2024-06-03 NOTE — OCCUPATIONAL THERAPY INITIAL EVALUATION ADULT - GENERAL OBSERVATIONS, REHAB EVAL
Pt received semi-supine in bed, +telemetry and hansen catheter, +bed alarm, agreeable to OT IE without complaints of pain. Pt A&Ox4. Pt left seated in bedside chair with all lines intact, chair alarm activated, all needs met, call bell in reach. Handoff PCA

## 2024-06-03 NOTE — PROGRESS NOTE ADULT - SUBJECTIVE AND OBJECTIVE BOX
DIANA CARVER  [id number]      Chief Complaint: Congestive heart failure/Pneumonia    Interval History: The patient reports minimal cough. Reports breathing is stable. Denies chest pain.     Tele: ventricular paced 70s BPM      Current meds:   acetaminophen     Tablet .. 650 milliGRAM(s) Oral every 6 hours PRN  aluminum hydroxide/magnesium hydroxide/simethicone Suspension 30 milliLiter(s) Oral every 4 hours PRN  atorvastatin 40 milliGRAM(s) Oral at bedtime  cefTRIAXone   IVPB 1000 milliGRAM(s) IV Intermittent every 24 hours  clopidogrel Tablet 75 milliGRAM(s) Oral daily  dextrose 10% Bolus 125 milliLiter(s) IV Bolus once  dextrose 5%. 1000 milliLiter(s) IV Continuous <Continuous>  dextrose 5%. 1000 milliLiter(s) IV Continuous <Continuous>  dextrose 50% Injectable 25 Gram(s) IV Push once  dextrose 50% Injectable 12.5 Gram(s) IV Push once  dextrose Oral Gel 15 Gram(s) Oral once PRN  doxazosin 2 milliGRAM(s) Oral daily  doxycycline IVPB      doxycycline IVPB 100 milliGRAM(s) IV Intermittent every 12 hours  furosemide   Injectable 40 milliGRAM(s) IV Push daily  glucagon  Injectable 1 milliGRAM(s) IntraMuscular once  insulin lispro (ADMELOG) corrective regimen sliding scale   SubCutaneous three times a day before meals  insulin lispro (ADMELOG) corrective regimen sliding scale   SubCutaneous at bedtime  latanoprost 0.005% Ophthalmic Solution 1 Drop(s) Both EYES at bedtime  loratadine 10 milliGRAM(s) Oral daily  melatonin 3 milliGRAM(s) Oral at bedtime PRN  metoprolol succinate ER 50 milliGRAM(s) Oral daily  multivitamin 1 Tablet(s) Oral daily  ondansetron Injectable 4 milliGRAM(s) IV Push every 8 hours PRN  rivaroxaban 15 milliGRAM(s) Oral with dinner      Objective:     Vital Signs:   T(C): 37 (06-02-24 @ 22:16), Max: 37 (06-02-24 @ 22:16)  HR: 72 (06-03-24 @ 05:39) (70 - 72)  BP: 138/75 (06-03-24 @ 05:39) (138/75 - 156/83)  RR: 16 (06-03-24 @ 05:39) (16 - 18)  SpO2: 97% (06-03-24 @ 05:39) (97% - 100%)  Wt(kg): --    Physical Exam:   General: no distress  HEENT: sclera anicteric  Neck: supple  CVS: JVP ~ 10 cm H20, irregularly irregular, s1, s2  Chest: unlabored respirations, crackles  Abdomen: non-distended  Extremities: chronic lower extremity venous skin changes  Neuro: awake, alert & oriented  Psych: normal affect    Labs:   03 Jun 2024 06:15    137    |  103    |  40     ----------------------------<  138    3.5     |  25     |  1.56     Ca    8.8        03 Jun 2024 06:15  Phos  3.4       02 Jun 2024 06:00  Mg     1.8       02 Jun 2024 06:00    TPro  7.2    /  Alb  2.9    /  TBili  1.1    /  DBili  x      /  AST  21     /  ALT  22     /  AlkPhos  169    02 Jun 2024 06:00                          10.9   4.88  )-----------( 145      ( 03 Jun 2024 06:15 )             33.6     PT/INR - ( 01 Jun 2024 13:50 )   PT: 15.7 sec;   INR: 1.39 ratio         PTT - ( 01 Jun 2024 13:50 )  PTT:27.1 sec          TTE Report from [place] (2021):  LVEF 55-60%  Normal RV systolic function  S/p TAVR, normally functioning   Mild MR, Mild TR  No pericardial effusion       ECG (6/1/24): ventricular paced

## 2024-06-03 NOTE — OCCUPATIONAL THERAPY INITIAL EVALUATION ADULT - NSACTIVITYREC_GEN_A_OT
Assist x1 on medical floor  Recommending inpatient rehab skilled OT services prior to d/c to [facility].

## 2024-06-03 NOTE — CONSULT NOTE ADULT - SUBJECTIVE AND OBJECTIVE BOX
HPI:  Patient is a 89 yo Male who had a prostatectomy 30 years ago with urinary incontinence that after managed with artificial urethral sphincter placed in [place] 30 years ago. He notes that he has had difficulty manipulating the device upon arrival.  He was admitted due to shortness of breath and lower extremity edema.  Concern for CHF/pneumonia. Patient seen and examined today.  He feels well and wishes to be discharged from the hospital without complaints.  No fevers, chills, flank pain, sensation of retention.  Has been incontinent with yellow urine managed with condom catheter      PAST MEDICAL & SURGICAL HISTORY:  Pacemaker      CAD (coronary artery disease)      HTN (hypertension)      Diabetes    Artifical urinary sphincter     FAMILY HISTORY:   No known  malignancy   SOCIAL HISTORY: Retired   Tobacco hx: smoker/nonsmoker   MEDICATIONS  (STANDING):  atorvastatin 40 milliGRAM(s) Oral at bedtime  cefTRIAXone   IVPB 1000 milliGRAM(s) IV Intermittent every 24 hours  clopidogrel Tablet 75 milliGRAM(s) Oral daily  dextrose 10% Bolus 125 milliLiter(s) IV Bolus once  dextrose 5%. 1000 milliLiter(s) (100 mL/Hr) IV Continuous <Continuous>  dextrose 5%. 1000 milliLiter(s) (50 mL/Hr) IV Continuous <Continuous>  dextrose 50% Injectable 25 Gram(s) IV Push once  dextrose 50% Injectable 12.5 Gram(s) IV Push once  doxazosin 2 milliGRAM(s) Oral daily  doxycycline IVPB      doxycycline IVPB 100 milliGRAM(s) IV Intermittent every 12 hours  furosemide   Injectable 40 milliGRAM(s) IV Push daily  glucagon  Injectable 1 milliGRAM(s) IntraMuscular once  insulin lispro (ADMELOG) corrective regimen sliding scale   SubCutaneous three times a day before meals  insulin lispro (ADMELOG) corrective regimen sliding scale   SubCutaneous at bedtime  latanoprost 0.005% Ophthalmic Solution 1 Drop(s) Both EYES at bedtime  loratadine 10 milliGRAM(s) Oral daily  metoprolol succinate ER 50 milliGRAM(s) Oral daily  multivitamin 1 Tablet(s) Oral daily  rivaroxaban 15 milliGRAM(s) Oral with dinner    MEDICATIONS  (PRN):  acetaminophen     Tablet .. 650 milliGRAM(s) Oral every 6 hours PRN Temp greater or equal to 38C (100.4F), Mild Pain (1 - 3)  aluminum hydroxide/magnesium hydroxide/simethicone Suspension 30 milliLiter(s) Oral every 4 hours PRN Dyspepsia  dextrose Oral Gel 15 Gram(s) Oral once PRN Blood Glucose LESS THAN 70 milliGRAM(s)/deciliter  melatonin 3 milliGRAM(s) Oral at bedtime PRN Insomnia  ondansetron Injectable 4 milliGRAM(s) IV Push every 8 hours PRN Nausea and/or Vomiting    Allergies    No Known Allergies    Intolerances        REVIEW OF SYSTEMS: Pertinent positives and negatives as stated in HPI, otherwise negative    Vital signs  T(C): 37 (06-02-24 @ 22:16), Max: 37 (06-02-24 @ 22:16)  HR: 72 (06-03-24 @ 05:39)  BP: 138/75 (06-03-24 @ 05:39)  SpO2: 97% (06-03-24 @ 05:39)  Wt(kg): --    Physical Exam    Gen: awake alert NAD    HEENT: normocephalic, atraumatic, no scleral icterus    Pulm: No respiratory distress, no subcostal retractions, no accessory muscle use     CV: S1 S2,    Abd: Soft, NT, ND,    : nonpalp bladder  circ phallus voiding yellow urine     MSK:  No CVAT bl  Moving all extremities, full ROM in all extremities, No edema present    NEURO: A&Ox3, no focal neurological deficits, CN 2-12 grossly intact    SKIN: warm and dry     LABS:                            10.9   4.88  )-----------( 145      ( 03 Jun 2024 06:15 )             33.6       06-03    137  |  103  |  40<H>  ----------------------------<  138<H>  3.5   |  25  |  1.56<H>    Ca    8.8      03 Jun 2024 06:15  Phos  3.4     06-02  Mg     1.8     06-02    TPro  7.2  /  Alb  2.9<L>  /  TBili  1.1  /  DBili  x   /  AST  21  /  ALT  22  /  AlkPhos  169<H>  06-02    PT/INR - ( 01 Jun 2024 13:50 )   PT: 15.7 sec;   INR: 1.39 ratio         PTT - ( 01 Jun 2024 13:50 )  PTT:27.1 sec  Urinalysis Basic - ( 03 Jun 2024 06:15 )    Color: x / Appearance: x / SG: x / pH: x  Gluc: 138 mg/dL / Ketone: x  / Bili: x / Urobili: x   Blood: x / Protein: x / Nitrite: x   Leuk Esterase: x / RBC: x / WBC x   Sq Epi: x / Non Sq Epi: x / Bacteria: x        Urine Cx: Culture - Blood (06.01.24 @ 18:47)    Gram Stain:   Growth in aerobic bottle: Gram positive cocci in pairs   -  Coagulase negative Staphylococcus: Detec   Specimen Source: .Blood Blood-Peripheral   Organism: Blood Culture PCR   Culture Results:   Growth in aerobic bottle: Gram positive cocci in pairs  Direct identification is available within approximately 3-5  hours either by Blood Panel Multiplexed PCR or Direct  MALDI-TOF. Details: [email or web address]   Organism Identification: Blood Culture PCR   Method Type: PCR    Culture - Blood (06.01.24 @ 18:50)    Specimen Source: .Blood Blood-Peripheral   Culture Results:   No growth at 24 hours    Radiology:  < from: US Renal (06.02.24 @ 14:21) >  FINDINGS:  Right kidney: 8.4 cm. No renal mass, hydronephrosis or calculi.    Left kidney: 8.0 cm. No renal mass, hydronephrosis or calculi.    Urinary bladder: Relatively collapsed urinary bladder.      IMPRESSION:  No obstructive uropathy.      < end of copied text >

## 2024-06-03 NOTE — PROGRESS NOTE ADULT - SUBJECTIVE AND OBJECTIVE BOX
No distress    Vital Signs Last 24 Hrs  T(C): 37.2 (06-03-24 @ 19:23), Max: 37.2 (06-03-24 @ 19:23)  T(F): 99 (06-03-24 @ 19:23), Max: 99 (06-03-24 @ 19:23)  HR: 71 (06-03-24 @ 19:23) (70 - 74)  BP: 143/74 (06-03-24 @ 19:23) (138/75 - 158/78)  RR: 18 (06-03-24 @ 19:23) (16 - 18)  SpO2: 98% (06-03-24 @ 19:23) (96% - 99%)    I&O's Detail    02 Jun 2024 07:01  -  03 Jun 2024 07:00  --------------------------------------------------------  OUT:    Incontinent per Condom Catheter (mL): 100 mL    Voided (mL): 1050 mL  Total OUT: 1150 mL    03 Jun 2024 07:01  -  03 Jun 2024 22:15  --------------------------------------------------------  OUT:    Incontinent per Condom Catheter (mL): 1050 mL  Total OUT: 1050 mL    s1s2  b/l air entry  soft, ND  + edema                        10.9   4.88  )-----------( 145      ( 03 Jun 2024 06:15 )             33.6     03 Jun 2024 06:15    137    |  103    |  40     ----------------------------<  138    3.5     |  25     |  1.56     Ca    8.8        03 Jun 2024 06:15  Phos  3.4       02 Jun 2024 06:00  Mg     1.8       02 Jun 2024 06:00    TPro  7.2    /  Alb  2.9    /  TBili  1.1    /  DBili  x      /  AST  21     /  ALT  22     /  AlkPhos  169    02 Jun 2024 06:00    LIVER FUNCTIONS - ( 02 Jun 2024 06:00 )  Alb: 2.9 g/dL / Pro: 7.2 g/dL / ALK PHOS: 169 U/L / ALT: 22 U/L / AST: 21 U/L / GGT: x           acetaminophen     Tablet .. 650 milliGRAM(s) Oral every 6 hours PRN  aluminum hydroxide/magnesium hydroxide/simethicone Suspension 30 milliLiter(s) Oral every 4 hours PRN  atorvastatin 40 milliGRAM(s) Oral at bedtime  clopidogrel Tablet 75 milliGRAM(s) Oral daily  dextrose 10% Bolus 125 milliLiter(s) IV Bolus once  dextrose 5%. 1000 milliLiter(s) IV Continuous <Continuous>  dextrose 5%. 1000 milliLiter(s) IV Continuous <Continuous>  dextrose 50% Injectable 25 Gram(s) IV Push once  dextrose 50% Injectable 12.5 Gram(s) IV Push once  dextrose Oral Gel 15 Gram(s) Oral once PRN  doxazosin 2 milliGRAM(s) Oral daily  furosemide   Injectable 40 milliGRAM(s) IV Push daily  glucagon  Injectable 1 milliGRAM(s) IntraMuscular once  insulin lispro (ADMELOG) corrective regimen sliding scale   SubCutaneous three times a day before meals  insulin lispro (ADMELOG) corrective regimen sliding scale   SubCutaneous at bedtime  latanoprost 0.005% Ophthalmic Solution 1 Drop(s) Both EYES at bedtime  loratadine 10 milliGRAM(s) Oral daily  melatonin 3 milliGRAM(s) Oral at bedtime PRN  metoprolol succinate ER 50 milliGRAM(s) Oral daily  multivitamin 1 Tablet(s) Oral daily  ondansetron Injectable 4 milliGRAM(s) IV Push every 8 hours PRN  rivaroxaban 15 milliGRAM(s) Oral with dinner    A/P:    CM, EF ~ 40%  Adm w/edema, SOB  Baseline Cr is unknown  Overall stable renal indices  No objection to diuresis  SONO w/o hydro  Check UA  Avoid nephrotoxins   F/u TRUE CASILLAS    [phone]

## 2024-06-03 NOTE — PROGRESS NOTE ADULT - SUBJECTIVE AND OBJECTIVE BOX
Patient is a 89y old  Male who presents with a chief complaint of SOB (03 Jun 2024 11:45)      Patient seen and examined at bedside. Pt states they feel well today denies current complaints including chest pain, shortness of breath, dizziness, nausea, vomiting, diarrhea, fever or chills.      ALLERGIES:  No Known Allergies    MEDICATIONS  (STANDING):  atorvastatin 40 milliGRAM(s) Oral at bedtime  cefTRIAXone   IVPB 1000 milliGRAM(s) IV Intermittent every 24 hours  clopidogrel Tablet 75 milliGRAM(s) Oral daily  dextrose 10% Bolus 125 milliLiter(s) IV Bolus once  dextrose 5%. 1000 milliLiter(s) (100 mL/Hr) IV Continuous <Continuous>  dextrose 5%. 1000 milliLiter(s) (50 mL/Hr) IV Continuous <Continuous>  dextrose 50% Injectable 25 Gram(s) IV Push once  dextrose 50% Injectable 12.5 Gram(s) IV Push once  doxazosin 2 milliGRAM(s) Oral daily  doxycycline IVPB      doxycycline IVPB 100 milliGRAM(s) IV Intermittent every 12 hours  furosemide   Injectable 40 milliGRAM(s) IV Push daily  glucagon  Injectable 1 milliGRAM(s) IntraMuscular once  insulin lispro (ADMELOG) corrective regimen sliding scale   SubCutaneous three times a day before meals  insulin lispro (ADMELOG) corrective regimen sliding scale   SubCutaneous at bedtime  latanoprost 0.005% Ophthalmic Solution 1 Drop(s) Both EYES at bedtime  loratadine 10 milliGRAM(s) Oral daily  metoprolol succinate ER 50 milliGRAM(s) Oral daily  multivitamin 1 Tablet(s) Oral daily  rivaroxaban 15 milliGRAM(s) Oral with dinner    MEDICATIONS  (PRN):  acetaminophen     Tablet .. 650 milliGRAM(s) Oral every 6 hours PRN Temp greater or equal to 38C (100.4F), Mild Pain (1 - 3)  aluminum hydroxide/magnesium hydroxide/simethicone Suspension 30 milliLiter(s) Oral every 4 hours PRN Dyspepsia  dextrose Oral Gel 15 Gram(s) Oral once PRN Blood Glucose LESS THAN 70 milliGRAM(s)/deciliter  melatonin 3 milliGRAM(s) Oral at bedtime PRN Insomnia  ondansetron Injectable 4 milliGRAM(s) IV Push every 8 hours PRN Nausea and/or Vomiting    Vital Signs Last 24 Hrs  T(F): 98.6 (03 Jun 2024 13:18), Max: 98.6 (02 Jun 2024 22:16)  HR: 74 (03 Jun 2024 13:18) (70 - 74)  BP: 140/74 (03 Jun 2024 13:18) (138/75 - 158/78)  RR: 16 (03 Jun 2024 13:18) (16 - 18)  SpO2: 99% (03 Jun 2024 13:18) (96% - 99%)  I&O's Summary    02 Jun 2024 07:01  -  03 Jun 2024 07:00  --------------------------------------------------------  IN: 0 mL / OUT: 1150 mL / NET: -1150 mL    03 Jun 2024 07:01  -  03 Jun 2024 13:32  --------------------------------------------------------  IN: 0 mL / OUT: 1050 mL / NET: -1050 mL      PHYSICAL EXAM:  General: NAD, A/O x 3  ENT: MMM, no oral thrush   Neck: Supple, No JVD  Lungs: Clear to auscultation bilaterally, non labored breathing  Cardio: RRR, S1/S2, No murmurs  Abdomen: Soft, Nontender, Nondistended; Bowel sounds present  Extremities: No calf tenderness, chronic LE skin changes    LABS:                        10.9   4.88  )-----------( 145      ( 03 Jun 2024 06:15 )             33.6     06-03    137  |  103  |  40  ----------------------------<  138  3.5   |  25  |  1.56    Ca    8.8      03 Jun 2024 06:15  Phos  3.4     06-02  Mg     1.8     06-02    TPro  7.2  /  Alb  2.9  /  TBili  1.1  /  DBili  x   /  AST  21  /  ALT  22  /  AlkPhos  169  06-02      PT/INR - ( 01 Jun 2024 13:50 )   PT: 15.7 sec;   INR: 1.39 ratio         PTT - ( 01 Jun 2024 13:50 )  PTT:27.1 sec    CARDIAC MARKERS ( 02 Jun 2024 06:00 )  x     / 20.8 ng/L / x     / x     / x      CARDIAC MARKERS ( 01 Jun 2024 13:50 )  x     / 16.3 ng/L / x     / x     / x            TSH 3.669   TSH with FT4 reflex --  Total T3 --              POCT Blood Glucose.: 187 mg/dL (03 Jun 2024 11:40)  POCT Blood Glucose.: 144 mg/dL (03 Jun 2024 08:25)  POCT Blood Glucose.: 190 mg/dL (02 Jun 2024 21:26)  POCT Blood Glucose.: 167 mg/dL (02 Jun 2024 17:05)  POCT Blood Glucose.: 167 mg/dL (02 Jun 2024 16:59)      Urinalysis Basic - ( 03 Jun 2024 06:15 )    Color: x / Appearance: x / SG: x / pH: x  Gluc: 138 mg/dL / Ketone: x  / Bili: x / Urobili: x   Blood: x / Protein: x / Nitrite: x   Leuk Esterase: x / RBC: x / WBC x   Sq Epi: x / Non Sq Epi: x / Bacteria: x        Culture - Blood (collected 01 Jun 2024 18:50)  Source: .Blood Blood-Peripheral  Preliminary Report (03 Jun 2024 03:02):    No growth at 24 hours    Culture - Blood (collected 01 Jun 2024 18:47)  Source: .Blood Blood-Peripheral  Gram Stain (02 Jun 2024 19:11):    Growth in aerobic bottle: Gram positive cocci in pairs  Preliminary Report (02 Jun 2024 19:12):    Growth in aerobic bottle: Gram positive cocci in pairs    Direct identification is available within approximately 3-5    hours either by Blood Panel Multiplexed PCR or Direct    MALDI-TOF. Details: [email or web address]  Organism: Blood Culture PCR (02 Jun 2024 21:26)  Organism: Blood Culture PCR (02 Jun 2024 21:26)      Method Type: PCR      -  Coagulase negative Staphylococcus: Detec          RADIOLOGY & ADDITIONAL TESTS:  < from: US Renal (06.02.24 @ 14:21) >      IMPRESSION:  No obstructive uropathy.        --- End of Report ---            PASTORA PICKENS MD; Attending Radiologist  This document has been electronically signed. Jun 2 2024  3:21PM    < end of copied text >    Care Discussed with Consultants/Other Providers:

## 2024-06-03 NOTE — PROGRESS NOTE ADULT - NS ATTEND AMEND GEN_ALL_CORE FT
89 y/o M with PMH HTN, CAD with PPM, PVD, Type 2 DM, essential tremor, hx prostate cancer, presented from [facility] c/o SOB admitted for acute on chronic CHF exacerbation. PNA, PE ruled out. Discontinue antibiotics, monitor for fevers. continue lasix IV daily. follow urology, cardio consults

## 2024-06-03 NOTE — OCCUPATIONAL THERAPY INITIAL EVALUATION ADULT - PERTINENT HX OF CURRENT PROBLEM, REHAB EVAL
Richard Davila is a 89 year old man with past medical history of Coronary artery disease (s/p CABG & PCIs), HFrEF (LVEF 30%), Atrial fibrillation (on Xarelto), TAVR, Permanent pacemaker, Hypertension, Chronic kidney disease, Prostate cancer (s/p surgery) brought in by EMS from assisted living facility due to progressive dyspnea and leg swelling, found to have signs of congestive heart failure.

## 2024-06-04 LAB
ANION GAP SERPL CALC-SCNC: 13 MMOL/L — SIGNIFICANT CHANGE UP (ref 5–17)
APPEARANCE UR: CLEAR — SIGNIFICANT CHANGE UP
BACTERIA # UR AUTO: SIGNIFICANT CHANGE UP /HPF
BILIRUB UR-MCNC: NEGATIVE — SIGNIFICANT CHANGE UP
BUN SERPL-MCNC: 41 MG/DL — HIGH (ref 7–23)
CALCIUM SERPL-MCNC: 9 MG/DL — SIGNIFICANT CHANGE UP (ref 8.4–10.5)
CHLORIDE SERPL-SCNC: 102 MMOL/L — SIGNIFICANT CHANGE UP (ref 96–108)
CO2 SERPL-SCNC: 23 MMOL/L — SIGNIFICANT CHANGE UP (ref 22–31)
COLOR SPEC: YELLOW — SIGNIFICANT CHANGE UP
COMMENT - URINE: SIGNIFICANT CHANGE UP
CREAT SERPL-MCNC: 1.64 MG/DL — HIGH (ref 0.5–1.3)
DIFF PNL FLD: NEGATIVE — SIGNIFICANT CHANGE UP
EGFR: 38 ML/MIN/1.73M2 — LOW
EPI CELLS # UR: 4 — SIGNIFICANT CHANGE UP
GLUCOSE BLDC GLUCOMTR-MCNC: 149 MG/DL — HIGH (ref 70–99)
GLUCOSE BLDC GLUCOMTR-MCNC: 159 MG/DL — HIGH (ref 70–99)
GLUCOSE BLDC GLUCOMTR-MCNC: 203 MG/DL — HIGH (ref 70–99)
GLUCOSE BLDC GLUCOMTR-MCNC: 212 MG/DL — HIGH (ref 70–99)
GLUCOSE SERPL-MCNC: 138 MG/DL — HIGH (ref 70–99)
GLUCOSE UR QL: NEGATIVE MG/DL — SIGNIFICANT CHANGE UP
GRAM STN FLD: ABNORMAL
HYALINE CASTS # UR AUTO: SIGNIFICANT CHANGE UP
KETONES UR-MCNC: NEGATIVE MG/DL — SIGNIFICANT CHANGE UP
LEUKOCYTE ESTERASE UR-ACNC: ABNORMAL
NITRITE UR-MCNC: NEGATIVE — SIGNIFICANT CHANGE UP
PH UR: 5 — SIGNIFICANT CHANGE UP (ref 5–8)
POTASSIUM SERPL-MCNC: 3.5 MMOL/L — SIGNIFICANT CHANGE UP (ref 3.5–5.3)
POTASSIUM SERPL-SCNC: 3.5 MMOL/L — SIGNIFICANT CHANGE UP (ref 3.5–5.3)
PROT UR-MCNC: 30 MG/DL
RBC CASTS # UR COMP ASSIST: 3 /HPF — SIGNIFICANT CHANGE UP (ref 0–4)
SODIUM SERPL-SCNC: 138 MMOL/L — SIGNIFICANT CHANGE UP (ref 135–145)
SP GR SPEC: 1.01 — SIGNIFICANT CHANGE UP (ref 1–1.03)
UROBILINOGEN FLD QL: 0.2 MG/DL — SIGNIFICANT CHANGE UP (ref 0.2–1)
WBC UR QL: 7 /HPF — HIGH (ref 0–5)

## 2024-06-04 PROCEDURE — 99233 SBSQ HOSP IP/OBS HIGH 50: CPT

## 2024-06-04 PROCEDURE — 99232 SBSQ HOSP IP/OBS MODERATE 35: CPT

## 2024-06-04 RX ADMIN — LORATADINE 10 MILLIGRAM(S): 10 TABLET ORAL at 12:03

## 2024-06-04 RX ADMIN — Medication 2: at 08:45

## 2024-06-04 RX ADMIN — Medication 1 TABLET(S): at 12:03

## 2024-06-04 RX ADMIN — CLOPIDOGREL BISULFATE 75 MILLIGRAM(S): 75 TABLET, FILM COATED ORAL at 12:03

## 2024-06-04 RX ADMIN — LATANOPROST 1 DROP(S): 0.05 SOLUTION/ DROPS OPHTHALMIC; TOPICAL at 22:29

## 2024-06-04 RX ADMIN — RIVAROXABAN 15 MILLIGRAM(S): KIT at 17:06

## 2024-06-04 RX ADMIN — Medication 50 MILLIGRAM(S): at 06:34

## 2024-06-04 RX ADMIN — ATORVASTATIN CALCIUM 40 MILLIGRAM(S): 80 TABLET, FILM COATED ORAL at 22:29

## 2024-06-04 RX ADMIN — Medication 2 MILLIGRAM(S): at 06:33

## 2024-06-04 RX ADMIN — Medication 4: at 12:02

## 2024-06-04 RX ADMIN — Medication 40 MILLIGRAM(S): at 06:34

## 2024-06-04 RX ADMIN — Medication 3 MILLIGRAM(S): at 22:32

## 2024-06-04 NOTE — PROGRESS NOTE ADULT - ASSESSMENT
Physical Examination:  GENERAL:               Alert, Oriented, No acute distress.    HEENT:                    No JVD, Dry MM  PULM:                     Bilateral air entry, Clear to auscultation bilaterally, no significant sputum production, bibasilarRales, No Rhonchi, No Wheezing  CVS:                         S1, S2,  + Murmur  ABD:                        Soft, nondistended, nontender, normoactive bowel sounds,   EXT:                         Chronic LE venous stasis and edema, nontender, No Cyanosis or Clubbing   NEURO:                  Alert, oriented, interactive, nonfocal, follows commands  PSYC:                      Calm, + Insight.      Assessment  89 year old male with history of lung cancer (unsure weather right or left) s/p ? resection denies chemo and unsure of radiation, non smoker, denies h/o COPD/Asthma, HTN, CAD with pacemaker s/p cabg, PVD, T2DM, essential tremor, hx prostate cancer, presenting from [facility] for concern of shortness of breath/GRANADOS      Problem List  1. Dyspnea suspect cardiogenic causes,   2. Chronic cough, possible from loculated pl effusions vs seasonal allergies  3. Abnormal CT chest with -       Ill-defined patchy and clustered bilateral lung opacities.       Left lower lobe subpleural 4 mm nodule versus intrapulmonary lymph node.       Right lung postsurgical changes.       Small loculated and nodular bilateral pleural effusions      Ill-defined patchy and clustered bilateral lung opacities.     Plan   Finish course empiric antibiotics ill defined patchy infiltrates  Loculated effusion on right could be post surgical and chronic, requested family to obtain old imaging  left lower lobe subpleural 4 mm nodule can consider repeat CT chest in 6-12 months if desire for cancer workup desired  Continue Claritin and Tessalon   nebs prn  d/w Dr Escobar

## 2024-06-04 NOTE — DIETITIAN INITIAL EVALUATION ADULT - PERTINENT LABORATORY DATA
06-04    138  |  102  |  41<H>  ----------------------------<  138<H>  3.5   |  23  |  1.64<H>    Ca    9.0      04 Jun 2024 06:41    POCT Blood Glucose.: 159 mg/dL (06-04-24 @ 08:13)  A1C with Estimated Average Glucose Result: 8.2 % (06-02-24 @ 06:00)

## 2024-06-04 NOTE — DIETITIAN INITIAL EVALUATION ADULT - ORAL INTAKE PTA/DIET HISTORY
Pt reported PTA consumed a diet controlled in CHO. Pt lives in a nursing home where 3 daily well balanced meals are provided. Pt had good appetite. NKFA.

## 2024-06-04 NOTE — CHART NOTE - NSCHARTNOTEFT_GEN_A_CORE
The patient has a mobility limitation that significantly impairs their ability to perform mobility related activities of daily living in the home.  The patient can safely use the rolling walker and their functional mobility deficit is sufficiently resolved with the use of a rolling walker.

## 2024-06-04 NOTE — DIETITIAN INITIAL EVALUATION ADULT - ADD RECOMMEND
Continue with Therapeutic Diabetes and Heart Healthy Diet education at f/u interview.   [place] pt's food preferences as feasible with prescribed diet.   Obtain and record PO intake and weights daily

## 2024-06-04 NOTE — PROGRESS NOTE ADULT - ASSESSMENT
Richard Davila is a 89 year old man with past medical history of Coronary artery disease (s/p CABG & PCIs), HFrEF (LVEF 30%), Atrial fibrillation (on Xarelto), TAVR, Permanent pacemaker, Hypertension, Chronic kidney disease, Prostate cancer (s/p surgery) brought in by EMS from assisted living facility due to progressive dyspnea and leg swelling, found to have signs of congestive heart failure. Richard Davila is a 89 year old man with past medical history of Coronary artery disease (s/p CABG & PCIs), HFrEF (LVEF 30%), Atrial fibrillation (on Xarelto), TAVR, Permanent pacemaker, Hypertension, Chronic kidney disease, Prostate cancer (s/p surgery) brought in by EMS from assisted living facility due to progressive dyspnea and leg swelling, found to have signs of congestive heart failure.    Acute on Chronic Systolic CHF  pt presented with worsening SOB, LE edema, BNP 3408, Tn negative  EKG sinus with CHB, ventricular paced, , pt has PPM  Cardiology following, pt is followed by Dr. Richard Avitia at [facility], previous ECHO showed EF ~ 30%  ECHO done during this admission shows EF ~ 40%, mild MR/TR, bioprosthetic valve in aortic position  Continue lasix 40 IV daily, cardiology to assist with GDMT baed on ECHO results  Follow up [employer] PPM interrogation, monitor renal function, continue metoprolol succinate  Anticipate one more day of IV diuresis prior to transition to oral, pt may benefit from entresto if renal function stabilizes  Monitor daily weights, Strict I/Os, follow up cardiology recommendations  Dopplers negative    Urinary Retention in pt with AUS  urology evaluation appreciated  do not attempt hansen placement in pt with artificial urinary sphincter  AUS has been re activated by Urology, monitor to ensure pt is properly voiding    Abnormal CT Chest  Hx of Lung Cancer  Chronic Cough, dry  CT chest revealed ill defined patchy and clustered bilateral lung opacities  left lower lobe subpleural 4 mm nodule vs intrapulmonary lymph node, RT lung post surgical changes  small loculated and nodular bilateral pleural effusions  pulmonology evaluation appreciated  stopped zosyn, low suspicion for acute pneumonia, follow up repeat blood cultures (BC x 1 with gram positive cocci, likely contaminated)  Strep pneumo Ag and Urine legionella negative, RVP negative  Continue nebulizers for now, dyspnea likely r/t CHF  Stable respiratory status off oxygen requirements, continue claritin for possible seasonal allergies    ALVIN/?CKD3  baseline Cr unknown  renal evaluation appreciated, overall stable renal indices  SONO without hydro, no objection to diuresis  avoid nephrotoxins, follow BMP  UA reviewed    T2DM  metformin being held  continue moderate ISS, FS    CAD s/p CABG and PCI  Atrial Fibrillation, persistent  cardiology following, ECHO as above  continue xarelto for stroke ppx  no evidence of ACS, continue plavix and high intensity statin therapies  rate control with metoprolol, monitor on telemetry    Prophylactic Measure  heparin    plan discussed with family at bedside, all questions answered         Richard Davila is a 89 year old man with past medical history of Coronary artery disease (s/p CABG & PCIs), HFrEF (LVEF 30%), Atrial fibrillation (on Xarelto), TAVR, Permanent pacemaker, Hypertension, Chronic kidney disease, Prostate cancer (s/p surgery) brought in by EMS from assisted living facility due to progressive dyspnea and leg swelling, found to have signs of congestive heart failure.    Acute on Chronic Systolic CHF  pt presented with worsening SOB, LE edema, BNP 3408, Tn negative  EKG sinus with CHB, ventricular paced, , pt has PPM  Cardiology following, pt is followed by Dr. Richard Avitia at [facility], previous ECHO showed EF ~ 30%  ECHO done during this admission shows EF ~ 40%, mild MR/TR, bioprosthetic valve in aortic position  Continue lasix 40 IV daily, cardiology to assist with GDMT baed on ECHO results  Follow up [employer] PPM interrogation, monitor renal function, continue metoprolol succinate  Anticipate one more day of IV diuresis prior to transition to oral, pt may benefit from entresto if renal function stabilizes  Monitor daily weights, Strict I/Os, follow up cardiology recommendations  Dopplers negative    Urinary Retention in pt with AUS  urology evaluation appreciated  do not attempt hansen placement in pt with artificial urinary sphincter  AUS has been re activated by Urology, monitor to ensure pt is properly voiding    Abnormal CT Chest  Hx of Lung Cancer  Chronic Cough, dry  CT chest revealed ill defined patchy and clustered bilateral lung opacities  left lower lobe subpleural 4 mm nodule vs intrapulmonary lymph node, RT lung post surgical changes  small loculated and nodular bilateral pleural effusions  pulmonology evaluation appreciated  stopped zosyn, low suspicion for acute pneumonia, follow up repeat blood cultures (BC grew coag neg staph in one bottle, presumed to be contaminant)  Strep pneumo Ag and Urine legionella negative, RVP negative  Continue nebulizers for now, dyspnea likely r/t CHF  Stable respiratory status off oxygen requirements, continue claritin for possible seasonal allergies    ALVIN/?CKD3  baseline Cr unknown  renal evaluation appreciated, overall stable renal indices  SONO without hydro, no objection to diuresis  avoid nephrotoxins, follow BMP  UA reviewed    T2DM  metformin being held  continue moderate ISS, FS    CAD s/p CABG and PCI  Atrial Fibrillation, persistent  cardiology following, ECHO as above  continue xarelto for stroke ppx  no evidence of ACS, continue plavix and high intensity statin therapies  rate control with metoprolol, monitor on telemetry    Prophylactic Measure  heparin    plan discussed with family at bedside, all questions answered         Richard Davila is a 89 year old man with past medical history of Coronary artery disease (s/p CABG & PCIs), HFrEF (LVEF 30%), Atrial fibrillation (on Xarelto), TAVR, Permanent pacemaker, Hypertension, Chronic kidney disease, Prostate cancer (s/p surgery) brought in by EMS from assisted living facility due to progressive dyspnea and leg swelling, found to have signs of congestive heart failure.    Acute on Chronic Systolic CHF  pt presented with worsening SOB, LE edema, BNP 3408, Tn negative  EKG sinus with CHB, ventricular paced, , pt has PPM  Cardiology following, pt is followed by Dr. Richard Avitia at [facility], previous ECHO showed EF ~ 30%  ECHO done during this admission shows EF ~ 40%, mild MR/TR, bioprosthetic valve in aortic position  Continue lasix 40 IV daily, cardiology to assist with GDMT baed on ECHO results and patient's ability to tolerate   Follow up [employer] PPM interrogation, monitor renal function, continue metoprolol succinate  Anticipate one more day of IV diuresis prior to transition to oral, pt may benefit from entresto when euvolemic and renal function improves  Monitor daily weights, Strict I/Os, follow up cardiology recommendations  Dopplers negative    Urinary Retention in pt with AUS  urology evaluation appreciated  do not attempt hansen placement in pt with artificial urinary sphincter  AUS has been re activated by Urology, monitor to ensure pt is properly voiding    Abnormal CT Chest  Hx of Lung Cancer  Chronic Cough, dry  CT chest revealed ill defined patchy and clustered bilateral lung opacities  left lower lobe subpleural 4 mm nodule vs intrapulmonary lymph node, RT lung post surgical changes  small loculated and nodular bilateral pleural effusions  pulmonology evaluation appreciated  stopped zosyn, low suspicion for acute pneumonia, follow up repeat blood cultures (BC grew coag neg staph in one bottle, presumed to be contaminant)  Strep pneumo Ag and Urine legionella negative, RVP negative  Continue nebulizers for now, dyspnea likely r/t CHF  Stable respiratory status off oxygen requirements, continue claritin for possible seasonal allergies    ALVIN/?CKD3  baseline Cr unknown  renal evaluation appreciated, overall stable renal indices  SONO without hydro, no objection to diuresis  avoid nephrotoxins, follow BMP  UA reviewed    T2DM  metformin being held  continue moderate ISS, FS    CAD s/p CABG and PCI  Atrial Fibrillation, persistent  cardiology following, ECHO as above  continue xarelto for stroke ppx  no evidence of ACS, continue plavix and high intensity statin therapies  rate control with metoprolol, monitor on telemetry    Prophylactic Measure  heparin    plan discussed with family at bedside, all questions answered

## 2024-06-04 NOTE — PROGRESS NOTE ADULT - NS ATTEND AMEND GEN_ALL_CORE FT
89 y/o M with PMH HTN, CAD with PPM, PVD, Type 2 DM, CKD3 essential tremor, hx prostate cancer, presented from [facility] c/o SOB admitted for acute on chronic CHF exacerbation. PNA, PE ruled out. Off antibiotics, monitor for fevers, follow repeat blood cx. continue lasix IV daily. urology, cardio consult appreciated 89 y/o M with PMH HTN, CAD with PPM, PVD, Type 2 DM, CKD3 essential tremor, hx prostate cancer, presented from [facility] c/o SOB admitted for acute on chronic CHF exacerbation. PNA, PE ruled out. Off antibiotics, monitor for fevers, follow repeat blood cx. continue lasix IV daily. urology, cardio consult appreciated. will start entresto.

## 2024-06-04 NOTE — PROGRESS NOTE ADULT - SUBJECTIVE AND OBJECTIVE BOX
DIANA PIMENTELMARIO  [id number]      Chief Complaint: Congestive heart failure/Pneumonia/Bacteremia    Interval History: The patient reports breathing is stable.    Tele: ventricular paced 70s BPM    Current meds:   acetaminophen     Tablet .. 650 milliGRAM(s) Oral every 6 hours PRN  aluminum hydroxide/magnesium hydroxide/simethicone Suspension 30 milliLiter(s) Oral every 4 hours PRN  atorvastatin 40 milliGRAM(s) Oral at bedtime  clopidogrel Tablet 75 milliGRAM(s) Oral daily  dextrose 10% Bolus 125 milliLiter(s) IV Bolus once  dextrose 5%. 1000 milliLiter(s) IV Continuous <Continuous>  dextrose 5%. 1000 milliLiter(s) IV Continuous <Continuous>  dextrose 50% Injectable 25 Gram(s) IV Push once  dextrose 50% Injectable 12.5 Gram(s) IV Push once  dextrose Oral Gel 15 Gram(s) Oral once PRN  doxazosin 2 milliGRAM(s) Oral daily  furosemide   Injectable 40 milliGRAM(s) IV Push daily  glucagon  Injectable 1 milliGRAM(s) IntraMuscular once  insulin lispro (ADMELOG) corrective regimen sliding scale   SubCutaneous three times a day before meals  insulin lispro (ADMELOG) corrective regimen sliding scale   SubCutaneous at bedtime  latanoprost 0.005% Ophthalmic Solution 1 Drop(s) Both EYES at bedtime  loratadine 10 milliGRAM(s) Oral daily  melatonin 3 milliGRAM(s) Oral at bedtime PRN  metoprolol succinate ER 50 milliGRAM(s) Oral daily  multivitamin 1 Tablet(s) Oral daily  ondansetron Injectable 4 milliGRAM(s) IV Push every 8 hours PRN  rivaroxaban 15 milliGRAM(s) Oral with dinner      Objective:     Vital Signs:   T(C): 36.4 (06-04-24 @ 05:10), Max: 37.2 (06-03-24 @ 19:23)  HR: 76 (06-04-24 @ 05:10) (70 - 76)  BP: 158/70 (06-04-24 @ 05:10) (140/74 - 158/78)  RR: 17 (06-04-24 @ 05:10) (16 - 18)  SpO2: 96% (06-04-24 @ 05:10) (96% - 99%)  Wt(kg): --    Physical Exam:   General: no distress  HEENT: sclera anicteric  Neck: supple  CVS: JVP ~ 10 cm H20, irregularly irregular, s1, s2  Chest: unlabored respirations, coarse breath sounds  Abdomen: non-distended  Extremities: chronic lower extremity venous skin changes  Neuro: awake, alert & oriented  Psych: normal affect    Labs:   04 Jun 2024 06:41    138    |  102    |  41     ----------------------------<  138    3.5     |  23     |  1.64     Ca    9.0        04 Jun 2024 06:41                            10.9   4.88  )-----------( 145      ( 03 Jun 2024 06:15 )             33.6         TTE Report from [place] (2021):  LVEF 55-60%  Normal RV systolic function  S/p TAVR, normally functioning   Mild MR, Mild TR  No pericardial effusion       TTE (6/3/24):   1. Technically difficult study with poor endocardial visualization.   2. The left ventricle endocardium is not well visualized, appears to   have moderately reduced LV systolic function in limited views, estimated visual LVEF 40%. Abnormal septal motion likely due to paced rhythm. Consider use of IV ultrasonic enhancing agent to better evaluate endocardium and for more accurate assessment of LVEF, if clinically indicated.   3. Mildly increased LV wall thickness.   4. Normal left ventricular internal cavity size.   5. The right ventricle is not well visualized, appears dilated.   6. Mild mitral valve regurgitation.   7. Mild tricuspid regurgitation.   8. There is a bioprosthetic valve in the aortic postion, appears well   seated with peak velocity within normal limits.   9. The pericardium was not well visualized.  10. Subcostal window not well visualized.          ECG (6/1/24): ventricular paced

## 2024-06-04 NOTE — PROGRESS NOTE ADULT - ASSESSMENT
Assessment:  Richard Davila is a 89 year old man with past medical history of Coronary artery disease (s/p CABG & PCIs), HFrEF (LVEF 30%), Atrial fibrillation (on Xarelto), TAVR, Permanent pacemaker, Hypertension, Chronic kidney disease, Prostate cancer (s/p surgery) brought in by EMS from assisted living facility due to progressive dyspnea and leg swelling, found to have signs of congestive heart failure.    ECG consistent with ventricular paced rhythm. Troponins negative x 2. CT chest consistent with bilateral lung opacities and small loculated and nodular bilateral pleural effusions. Pro BNP elevated, Cr elevated. Leg US negative for DVT.    Recommendations:  [] HFrEF: Family at bedside have notes from recent cardiologist office, Dr. Richard Avitia at [facility] which indicates patient has LVEF of 30%. TTE here consistent with LVEF 40%. He was on Torsemide 20 mg PO daily. Continue Lasix 40 mg IVP daily with close monitoring of renal function. When euvolemic and if renal function improves, would benefit from Entresto. Continue Metoprolol succinate. Recommend [employer] PPM interrogation (tele tech made aware, awaiting results).    [] Atrial fibrillation: Continue Xarelto for stroke prophylaxis  [] CAD s/p CABG and PCI: Appears stable. Continue Plavix and high intensity statin  [] Pneumonia: Blood cultures now positive, follow up Pulmonary, patient receiving antibiotics    We will continue to follow along.    Natasha Bains MD  Cardiology         Patient/Caregiver provided printed discharge information.

## 2024-06-04 NOTE — DIETITIAN INITIAL EVALUATION ADULT - OTHER INFO
H&P: 89 y/o M with PMHx HTN, CAD with pacemaker, PVD, T2DM, essential tremor, hx prostate cancer, presented from [facility] for concern of shortness of breath for the past 6-8 weeks and associated b/l swelling for the past 1.5 months, admitted for CHF exacerbation PNA, and r/o PE.    Pt reports good appetite. PO intake %. Pt feeds self, reports no chewing/swallowing difficulties. NKFA. Denies N/V/C/D. Last BM: 6/03. UBW: 165 Lbs ~2 month ago, current weight 185 Lbs. Edema on b/l leg +1. Pt likely has 20 Lbs on fluids retention. Skin WDL bruising and L shin abrasion. Discussed w/ patient Heart Healthy and consistent CHO therapeutic diet. Rx to minimize juice consumption, avoiding high salt and processed foods, examples provided. Pt was agreeable w/ recommendations.  No significant muscle/fat loss identified.

## 2024-06-04 NOTE — PROGRESS NOTE ADULT - SUBJECTIVE AND OBJECTIVE BOX
Follow-up Pulmonary Progress Note  Chief Complaint : Acute on chronic systolic congestive heart failure      patient seen and examined  comfortable  cough waxing and waning        Allergies :No Known Allergies      PAST MEDICAL & SURGICAL HISTORY:  Pacemaker    CAD (coronary artery disease)    HTN (hypertension)    Diabetes        Medications:  MEDICATIONS  (STANDING):  atorvastatin 40 milliGRAM(s) Oral at bedtime  clopidogrel Tablet 75 milliGRAM(s) Oral daily  dextrose 10% Bolus 125 milliLiter(s) IV Bolus once  dextrose 5%. 1000 milliLiter(s) (100 mL/Hr) IV Continuous <Continuous>  dextrose 5%. 1000 milliLiter(s) (50 mL/Hr) IV Continuous <Continuous>  dextrose 50% Injectable 25 Gram(s) IV Push once  dextrose 50% Injectable 12.5 Gram(s) IV Push once  doxazosin 2 milliGRAM(s) Oral daily  furosemide   Injectable 40 milliGRAM(s) IV Push daily  glucagon  Injectable 1 milliGRAM(s) IntraMuscular once  insulin lispro (ADMELOG) corrective regimen sliding scale   SubCutaneous three times a day before meals  insulin lispro (ADMELOG) corrective regimen sliding scale   SubCutaneous at bedtime  latanoprost 0.005% Ophthalmic Solution 1 Drop(s) Both EYES at bedtime  loratadine 10 milliGRAM(s) Oral daily  metoprolol succinate ER 50 milliGRAM(s) Oral daily  multivitamin 1 Tablet(s) Oral daily  rivaroxaban 15 milliGRAM(s) Oral with dinner    MEDICATIONS  (PRN):  acetaminophen     Tablet .. 650 milliGRAM(s) Oral every 6 hours PRN Temp greater or equal to 38C (100.4F), Mild Pain (1 - 3)  aluminum hydroxide/magnesium hydroxide/simethicone Suspension 30 milliLiter(s) Oral every 4 hours PRN Dyspepsia  dextrose Oral Gel 15 Gram(s) Oral once PRN Blood Glucose LESS THAN 70 milliGRAM(s)/deciliter  melatonin 3 milliGRAM(s) Oral at bedtime PRN Insomnia  ondansetron Injectable 4 milliGRAM(s) IV Push every 8 hours PRN Nausea and/or Vomiting      Antibiotics History  cefTRIAXone   IVPB 1000 milliGRAM(s) IV Intermittent once, 06-01-24 @ 18:47, Stop order after: 1 Doses  cefTRIAXone   IVPB 1000 milliGRAM(s) IV Intermittent every 24 hours, 06-02-24 @ 18:00, Stop order after: 5 Days  doxycycline IVPB    , 06-01-24 @ 21:33  doxycycline IVPB 100 milliGRAM(s) IV Intermittent once, 06-01-24 @ 20:24  doxycycline IVPB 100 milliGRAM(s) IV Intermittent every 12 hours, 06-02-24 @ 06:00      Heme Medications   clopidogrel Tablet 75 milliGRAM(s) Oral daily, 06-01-24 @ 21:04  rivaroxaban 15 milliGRAM(s) Oral with dinner, 06-02-24 @ 12:39      GI Medications  aluminum hydroxide/magnesium hydroxide/simethicone Suspension 30 milliLiter(s) Oral every 4 hours, 06-01-24 @ 20:30, Routine PRN        LABS:                        10.9   4.88  )-----------( 145      ( 03 Jun 2024 06:15 )             33.6     06-04    138  |  102  |  41<H>  ----------------------------<  138<H>  3.5   |  23  |  1.64<H>    Ca    9.0      04 Jun 2024 06:41      HIT ab -- 06-01 @ 13:50  HIT ab EIA --  D Dimer -686     < from: TTE Echo Complete w/o Contrast w/ Doppler (06.03.24 @ 09:42) >  Summary:   1. Technically difficult study with poor endocardial visualization.   2. The left ventricle endocardium is not well visualized, appears to   have moderately reduced LV systolic function in limited views, estimated visual LVEF  40%. Abnormal septal motion likely dueto paced rhythm. Consider use of IV ultrasonic enhancing agent to better evaluate endocardium and for more accurate assessment of LVEF, if clinically indicated.   3. Mildly increased LV wall thickness.   4. Normal left ventricular internal cavitysize.   5. The right ventricle is not well visualized, appears dilated.   6. Mild mitral valve regurgitation.   7. Mild tricuspid regurgitation.   8. There is a bioprosthetic valve in the aortic postion, appears well seated with peak velocity within normal limits.   9. The pericardium was not well visualized.  10. Subcostal window not well visualized.    < end of copied text >      CULTURES: (if applicable)    Culture - Blood (collected 06-01-24 @ 18:50)  Source: .Blood Blood-Peripheral  Gram Stain (06-04-24 @ 03:44):    Growth in anaerobic bottle: Gram Positive Cocci in Clusters  Preliminary Report (06-04-24 @ 03:44):    Growth in anaerobic bottle: Gram Positive Cocci in Clusters    Culture - Blood (collected 06-01-24 @ 18:47)  Source: .Blood Blood-Peripheral  Gram Stain (06-02-24 @ 19:11):    Growth in aerobic bottle: Gram positive cocci in pairs  Final Report (06-03-24 @ 17:35):    Growth in aerobic bottle: Staphylococcus haemolyticus    Isolation of Coagulase negative Staphylococcus from single blood culture    sets may represent    contamination. Contact the Microbiology Department at [phone] if    susceptibility testing is    clinically indicated.    Direct identification is available within approximately 3-5    hours either by Blood Panel Multiplexed PCR or Direct    MALDI-TOF. Details: [email or web address]  Organism: Blood Culture PCR (06-03-24 @ 17:35)  Organism: Blood Culture PCR (06-03-24 @ 17:35)      Method Type: PCR      -  Coagulase negative Staphylococcus: Detec      Rapid RVP Result: NotDetec (06-02-24 @ 21:05)  Rapid RVP Result: NotDetec (06-01-24 @ 20:40)          VITALS:  T(C): 36.4 (06-04-24 @ 05:10), Max: 37.2 (06-03-24 @ 19:23)  T(F): 97.5 (06-04-24 @ 05:10), Max: 99 (06-03-24 @ 19:23)  HR: 70 (06-04-24 @ 10:20) (70 - 76)  BP: 144/82 (06-04-24 @ 10:20) (140/74 - 158/70)  BP(mean): --  ABP: --  ABP(mean): --  RR: 17 (06-04-24 @ 05:10) (16 - 18)  SpO2: 96% (06-04-24 @ 10:20) (96% - 99%)  CVP(mm Hg): --  CVP(cm H2O): --    Ins and Outs     06-03-24 @ 07:01  -  06-04-24 @ 07:00  --------------------------------------------------------  IN: 0 mL / OUT: 1350 mL / NET: -1350 mL        Height (cm): 175.3 (06-02-24 @ 22:16)  Weight (kg): 83.9 (06-01-24 @ 13:07)  BMI (kg/m2): 27.3 (06-02-24 @ 22:16)        I&O's Detail    03 Jun 2024 07:01  -  04 Jun 2024 07:00  --------------------------------------------------------  IN:  Total IN: 0 mL    OUT:    Incontinent per Condom Catheter (mL): 1350 mL  Total OUT: 1350 mL    Total NET: -1350 mL

## 2024-06-04 NOTE — PROGRESS NOTE ADULT - SUBJECTIVE AND OBJECTIVE BOX
Patient is a 89y old  Male who presents with a chief complaint of SOB (03 Jun 2024 22:14)    Patient seen and examined at bedside.  no acute overnight events    ALLERGIES:  No Known Allergies        Vital Signs Last 24 Hrs  T(F): 97.5 (04 Jun 2024 05:10), Max: 99 (03 Jun 2024 19:23)  HR: 76 (04 Jun 2024 05:10) (70 - 76)  BP: 158/70 (04 Jun 2024 05:10) (140/74 - 158/78)  RR: 17 (04 Jun 2024 05:10) (16 - 18)  SpO2: 96% (04 Jun 2024 05:10) (96% - 99%)  I&O's Summary    03 Jun 2024 07:01  -  04 Jun 2024 07:00  --------------------------------------------------------  IN: 0 mL / OUT: 1350 mL / NET: -1350 mL      MEDICATIONS:  acetaminophen     Tablet .. 650 milliGRAM(s) Oral every 6 hours PRN  aluminum hydroxide/magnesium hydroxide/simethicone Suspension 30 milliLiter(s) Oral every 4 hours PRN  atorvastatin 40 milliGRAM(s) Oral at bedtime  clopidogrel Tablet 75 milliGRAM(s) Oral daily  dextrose 10% Bolus 125 milliLiter(s) IV Bolus once  dextrose 5%. 1000 milliLiter(s) IV Continuous <Continuous>  dextrose 5%. 1000 milliLiter(s) IV Continuous <Continuous>  dextrose 50% Injectable 25 Gram(s) IV Push once  dextrose 50% Injectable 12.5 Gram(s) IV Push once  dextrose Oral Gel 15 Gram(s) Oral once PRN  doxazosin 2 milliGRAM(s) Oral daily  furosemide   Injectable 40 milliGRAM(s) IV Push daily  glucagon  Injectable 1 milliGRAM(s) IntraMuscular once  insulin lispro (ADMELOG) corrective regimen sliding scale   SubCutaneous three times a day before meals  insulin lispro (ADMELOG) corrective regimen sliding scale   SubCutaneous at bedtime  latanoprost 0.005% Ophthalmic Solution 1 Drop(s) Both EYES at bedtime  loratadine 10 milliGRAM(s) Oral daily  melatonin 3 milliGRAM(s) Oral at bedtime PRN  metoprolol succinate ER 50 milliGRAM(s) Oral daily  multivitamin 1 Tablet(s) Oral daily  ondansetron Injectable 4 milliGRAM(s) IV Push every 8 hours PRN  rivaroxaban 15 milliGRAM(s) Oral with dinner      PHYSICAL EXAM:  General: NAD, A/O x 3  ENT: MMM, no oral thrush  Neck: Supple, No JVD  Lungs: Clear to auscultation bilaterally, bilateral air entry, non labored  Cardio: S1S2 irregular  Abdomen: Soft, Nontender, Nondistended; Bowel sounds present  Extremities: No cyanosis, Chronic LE skin changes    LABS:                        10.9   4.88  )-----------( 145      ( 03 Jun 2024 06:15 )             33.6     06-04    138  |  102  |  41  ----------------------------<  138  3.5   |  23  |  1.64    Ca    9.0      04 Jun 2024 06:41  Phos  3.4     06-02  Mg     1.8     06-02    TPro  7.2  /  Alb  2.9  /  TBili  1.1  /  DBili  x   /  AST  21  /  ALT  22  /  AlkPhos  169  06-02      PT/INR - ( 01 Jun 2024 13:50 )   PT: 15.7 sec;   INR: 1.39 ratio         PTT - ( 01 Jun 2024 13:50 )  PTT:27.1 sec    CARDIAC MARKERS ( 02 Jun 2024 06:00 )  x     / 20.8 ng/L / x     / x     / x      CARDIAC MARKERS ( 01 Jun 2024 13:50 )  x     / 16.3 ng/L / x     / x     / x            TSH 3.669   TSH with FT4 reflex --  Total T3 --              POCT Blood Glucose.: 157 mg/dL (03 Jun 2024 21:06)  POCT Blood Glucose.: 198 mg/dL (03 Jun 2024 16:45)  POCT Blood Glucose.: 187 mg/dL (03 Jun 2024 11:40)  POCT Blood Glucose.: 144 mg/dL (03 Jun 2024 08:25)      Urinalysis Basic - ( 04 Jun 2024 06:41 )    Color: x / Appearance: x / SG: x / pH: x  Gluc: 138 mg/dL / Ketone: x  / Bili: x / Urobili: x   Blood: x / Protein: x / Nitrite: x   Leuk Esterase: x / RBC: x / WBC x   Sq Epi: x / Non Sq Epi: x / Bacteria: x        Culture - Blood (collected 01 Jun 2024 18:50)  Source: .Blood Blood-Peripheral  Gram Stain (04 Jun 2024 03:44):    Growth in anaerobic bottle: Gram Positive Cocci in Clusters  Preliminary Report (04 Jun 2024 03:44):    Growth in anaerobic bottle: Gram Positive Cocci in Clusters    Culture - Blood (collected 01 Jun 2024 18:47)  Source: .Blood Blood-Peripheral  Gram Stain (02 Jun 2024 19:11):    Growth in aerobic bottle: Gram positive cocci in pairs  Final Report (03 Jun 2024 17:35):    Growth in aerobic bottle: Staphylococcus haemolyticus    Isolation of Coagulase negative Staphylococcus from single blood culture    sets may represent    contamination. Contact the Microbiology Department at [phone] if    susceptibility testing is    clinically indicated.    Direct identification is available within approximately 3-5    hours either by Blood Panel Multiplexed PCR or Direct    MALDI-TOF. Details: [email or web address]  Organism: Blood Culture PCR (03 Jun 2024 17:35)  Organism: Blood Culture PCR (03 Jun 2024 17:35)      Method Type: PCR      -  Coagulase negative Staphylococcus: Detec          RADIOLOGY & ADDITIONAL TESTS:    Care Discussed with Consultants/Other Providers:

## 2024-06-04 NOTE — DIETITIAN INITIAL EVALUATION ADULT - PERTINENT MEDS FT
MEDICATIONS  (STANDING):  atorvastatin 40 milliGRAM(s) Oral at bedtime  clopidogrel Tablet 75 milliGRAM(s) Oral daily  dextrose 10% Bolus 125 milliLiter(s) IV Bolus once  dextrose 5%. 1000 milliLiter(s) (100 mL/Hr) IV Continuous <Continuous>  dextrose 5%. 1000 milliLiter(s) (50 mL/Hr) IV Continuous <Continuous>  dextrose 50% Injectable 25 Gram(s) IV Push once  dextrose 50% Injectable 12.5 Gram(s) IV Push once  doxazosin 2 milliGRAM(s) Oral daily  furosemide   Injectable 40 milliGRAM(s) IV Push daily  glucagon  Injectable 1 milliGRAM(s) IntraMuscular once  insulin lispro (ADMELOG) corrective regimen sliding scale   SubCutaneous three times a day before meals  insulin lispro (ADMELOG) corrective regimen sliding scale   SubCutaneous at bedtime  latanoprost 0.005% Ophthalmic Solution 1 Drop(s) Both EYES at bedtime  loratadine 10 milliGRAM(s) Oral daily  metoprolol succinate ER 50 milliGRAM(s) Oral daily  multivitamin 1 Tablet(s) Oral daily  rivaroxaban 15 milliGRAM(s) Oral with dinner    MEDICATIONS  (PRN):  acetaminophen     Tablet .. 650 milliGRAM(s) Oral every 6 hours PRN Temp greater or equal to 38C (100.4F), Mild Pain (1 - 3)  aluminum hydroxide/magnesium hydroxide/simethicone Suspension 30 milliLiter(s) Oral every 4 hours PRN Dyspepsia  dextrose Oral Gel 15 Gram(s) Oral once PRN Blood Glucose LESS THAN 70 milliGRAM(s)/deciliter  melatonin 3 milliGRAM(s) Oral at bedtime PRN Insomnia  ondansetron Injectable 4 milliGRAM(s) IV Push every 8 hours PRN Nausea and/or Vomiting

## 2024-06-04 NOTE — PROVIDER CONTACT NOTE (CRITICAL VALUE NOTIFICATION) - TEST AND RESULT REPORTED:
Blood culture- positive growth in aerobic bottle of gram positive cocci in pairs
Blood Culture - Growth in Anaerobic Bottle - Gram Positive Cocci in Clusters

## 2024-06-04 NOTE — PROGRESS NOTE ADULT - SUBJECTIVE AND OBJECTIVE BOX
No distress    Vital Signs Last 24 Hrs  T(C): 36.8 (06-04-24 @ 19:25), Max: 36.8 (06-04-24 @ 19:25)  T(F): 98.3 (06-04-24 @ 19:25), Max: 98.3 (06-04-24 @ 19:25)  HR: 69 (06-04-24 @ 19:25) (69 - 104)  BP: 156/80 (06-04-24 @ 19:25) (136/69 - 158/70)  RR: 18 (06-04-24 @ 19:25) (16 - 18)  SpO2: 98% (06-04-24 @ 19:25) (96% - 98%)    I&O's Detail    03 Jun 2024 07:01  -  04 Jun 2024 07:00  --------------------------------------------------------  OUT:    Incontinent per Condom Catheter (mL): 1350 mL  Total OUT: 1350 mL    04 Jun 2024 07:01  -  04 Jun 2024 22:13  --------------------------------------------------------  OUT:    Incontinent per Condom Catheter (mL): 350 mL  Total OUT: 350 mL    s1s2  b/l air entry  soft, ND  + edema                              10.9   4.88  )-----------( 145      ( 03 Jun 2024 06:15 )             33.6     04 Jun 2024 06:41    138    |  102    |  41     ----------------------------<  138    3.5     |  23     |  1.64     Ca    9.0        04 Jun 2024 06:41    Culture - Blood (collected 03 Jun 2024 13:03)  Source: .Blood Blood-Peripheral  Preliminary Report (04 Jun 2024 20:02):    No growth at 24 hours    Culture - Blood (collected 03 Jun 2024 13:03)  Source: .Blood Blood-Venous  Preliminary Report (04 Jun 2024 20:02):    No growth at 24 hours    acetaminophen     Tablet .. 650 milliGRAM(s) Oral every 6 hours PRN  aluminum hydroxide/magnesium hydroxide/simethicone Suspension 30 milliLiter(s) Oral every 4 hours PRN  atorvastatin 40 milliGRAM(s) Oral at bedtime  clopidogrel Tablet 75 milliGRAM(s) Oral daily  dextrose 10% Bolus 125 milliLiter(s) IV Bolus once  dextrose 5%. 1000 milliLiter(s) IV Continuous <Continuous>  dextrose 5%. 1000 milliLiter(s) IV Continuous <Continuous>  dextrose 50% Injectable 25 Gram(s) IV Push once  dextrose 50% Injectable 12.5 Gram(s) IV Push once  dextrose Oral Gel 15 Gram(s) Oral once PRN  doxazosin 2 milliGRAM(s) Oral daily  furosemide   Injectable 40 milliGRAM(s) IV Push daily  glucagon  Injectable 1 milliGRAM(s) IntraMuscular once  insulin lispro (ADMELOG) corrective regimen sliding scale   SubCutaneous three times a day before meals  insulin lispro (ADMELOG) corrective regimen sliding scale   SubCutaneous at bedtime  latanoprost 0.005% Ophthalmic Solution 1 Drop(s) Both EYES at bedtime  loratadine 10 milliGRAM(s) Oral daily  melatonin 3 milliGRAM(s) Oral at bedtime PRN  metoprolol succinate ER 50 milliGRAM(s) Oral daily  multivitamin 1 Tablet(s) Oral daily  ondansetron Injectable 4 milliGRAM(s) IV Push every 8 hours PRN  rivaroxaban 15 milliGRAM(s) Oral with dinner    A/P:    CM, EF ~ 40%  Adm w/edema, SOB  Baseline Cr is unknown  Overall stable renal indices  No objection to diuresis  SONO w/o hydro  Check UA  Avoid nephrotoxins   F/u BMP, UO    [phone]

## 2024-06-04 NOTE — CHART NOTE - NSCHARTNOTEFT_GEN_A_CORE
Notified by RN about positive BCx from 6/1 growing gram + cocci in clusters. Pt was previously on abx but it was discontinued and repeat BCx's were collected yesterday. Will sign out to day team.

## 2024-06-05 LAB
ANION GAP SERPL CALC-SCNC: 10 MMOL/L — SIGNIFICANT CHANGE UP (ref 5–17)
BUN SERPL-MCNC: 44 MG/DL — HIGH (ref 7–23)
CALCIUM SERPL-MCNC: 8.9 MG/DL — SIGNIFICANT CHANGE UP (ref 8.4–10.5)
CHLORIDE SERPL-SCNC: 101 MMOL/L — SIGNIFICANT CHANGE UP (ref 96–108)
CO2 SERPL-SCNC: 25 MMOL/L — SIGNIFICANT CHANGE UP (ref 22–31)
CREAT SERPL-MCNC: 1.55 MG/DL — HIGH (ref 0.5–1.3)
CULTURE RESULTS: ABNORMAL
EGFR: 40 ML/MIN/1.73M2 — LOW
GLUCOSE BLDC GLUCOMTR-MCNC: 155 MG/DL — HIGH (ref 70–99)
GLUCOSE BLDC GLUCOMTR-MCNC: 176 MG/DL — HIGH (ref 70–99)
GLUCOSE BLDC GLUCOMTR-MCNC: 211 MG/DL — HIGH (ref 70–99)
GLUCOSE BLDC GLUCOMTR-MCNC: 233 MG/DL — HIGH (ref 70–99)
GLUCOSE SERPL-MCNC: 124 MG/DL — HIGH (ref 70–99)
HCT VFR BLD CALC: 32 % — LOW (ref 39–50)
HGB BLD-MCNC: 10.2 G/DL — LOW (ref 13–17)
MAGNESIUM SERPL-MCNC: 1.9 MG/DL — SIGNIFICANT CHANGE UP (ref 1.6–2.6)
MCHC RBC-ENTMCNC: 24.3 PG — LOW (ref 27–34)
MCHC RBC-ENTMCNC: 31.9 GM/DL — LOW (ref 32–36)
MCV RBC AUTO: 76.4 FL — LOW (ref 80–100)
NRBC # BLD: 0 /100 WBCS — SIGNIFICANT CHANGE UP (ref 0–0)
PLATELET # BLD AUTO: 154 K/UL — SIGNIFICANT CHANGE UP (ref 150–400)
POTASSIUM SERPL-MCNC: 3.1 MMOL/L — LOW (ref 3.5–5.3)
POTASSIUM SERPL-SCNC: 3.1 MMOL/L — LOW (ref 3.5–5.3)
RBC # BLD: 4.19 M/UL — LOW (ref 4.2–5.8)
RBC # FLD: 16.7 % — HIGH (ref 10.3–14.5)
SODIUM SERPL-SCNC: 136 MMOL/L — SIGNIFICANT CHANGE UP (ref 135–145)
SPECIMEN SOURCE: SIGNIFICANT CHANGE UP
WBC # BLD: 5.85 K/UL — SIGNIFICANT CHANGE UP (ref 3.8–10.5)
WBC # FLD AUTO: 5.85 K/UL — SIGNIFICANT CHANGE UP (ref 3.8–10.5)

## 2024-06-05 PROCEDURE — 93010 ELECTROCARDIOGRAM REPORT: CPT | Mod: 76

## 2024-06-05 PROCEDURE — 99232 SBSQ HOSP IP/OBS MODERATE 35: CPT

## 2024-06-05 PROCEDURE — 99233 SBSQ HOSP IP/OBS HIGH 50: CPT

## 2024-06-05 RX ORDER — POTASSIUM CHLORIDE 20 MEQ
40 PACKET (EA) ORAL
Refills: 0 | Status: COMPLETED | OUTPATIENT
Start: 2024-06-05 | End: 2024-06-05

## 2024-06-05 RX ORDER — MAGNESIUM OXIDE 400 MG ORAL TABLET 241.3 MG
400 TABLET ORAL
Refills: 0 | Status: COMPLETED | OUTPATIENT
Start: 2024-06-05 | End: 2024-06-07

## 2024-06-05 RX ADMIN — Medication 4: at 17:28

## 2024-06-05 RX ADMIN — Medication 1 TABLET(S): at 14:38

## 2024-06-05 RX ADMIN — MAGNESIUM OXIDE 400 MG ORAL TABLET 400 MILLIGRAM(S): 241.3 TABLET ORAL at 12:37

## 2024-06-05 RX ADMIN — CLOPIDOGREL BISULFATE 75 MILLIGRAM(S): 75 TABLET, FILM COATED ORAL at 14:38

## 2024-06-05 RX ADMIN — Medication 40 MILLIEQUIVALENT(S): at 17:29

## 2024-06-05 RX ADMIN — ATORVASTATIN CALCIUM 40 MILLIGRAM(S): 80 TABLET, FILM COATED ORAL at 22:14

## 2024-06-05 RX ADMIN — Medication 40 MILLIGRAM(S): at 06:19

## 2024-06-05 RX ADMIN — LORATADINE 10 MILLIGRAM(S): 10 TABLET ORAL at 14:38

## 2024-06-05 RX ADMIN — Medication 2 MILLIGRAM(S): at 06:19

## 2024-06-05 RX ADMIN — Medication 2: at 08:39

## 2024-06-05 RX ADMIN — LATANOPROST 1 DROP(S): 0.05 SOLUTION/ DROPS OPHTHALMIC; TOPICAL at 22:14

## 2024-06-05 RX ADMIN — RIVAROXABAN 15 MILLIGRAM(S): KIT at 17:29

## 2024-06-05 RX ADMIN — Medication 50 MILLIGRAM(S): at 06:19

## 2024-06-05 RX ADMIN — Medication 40 MILLIEQUIVALENT(S): at 14:39

## 2024-06-05 RX ADMIN — MAGNESIUM OXIDE 400 MG ORAL TABLET 400 MILLIGRAM(S): 241.3 TABLET ORAL at 17:29

## 2024-06-05 NOTE — CHART NOTE - NSCHARTNOTEFT_GEN_A_CORE
Called by RN that Pt's HR went up to the 200s. Went to bedside to evaluate the patient accompanied by Dr. Salamanca. He refers feeling tired as he was woken up by nursing due to heart rate episode. Currently HR in 70s, Pt has pacemaker. Denies dizziness, chest pain, palpitation, or SOB.     Vital Signs Last 24 Hrs  T(C): 36.8 (04 Jun 2024 19:25), Max: 36.8 (04 Jun 2024 19:25)  T(F): 98.3 (04 Jun 2024 19:25), Max: 98.3 (04 Jun 2024 19:25)  HR: 69 (04 Jun 2024 19:25) (69 - 104)  BP: 156/80 (04 Jun 2024 19:25) (136/69 - 158/70)  BP(mean): --  RR: 18 (04 Jun 2024 19:25) (16 - 18)  SpO2: 98% (04 Jun 2024 19:25) (96% - 98%)    Parameters below as of 04 Jun 2024 20:45  Patient On (Oxygen Delivery Method): room air    GEN: found laying in bed, alert and awake, NAD  Respi:  CTAx2, no accesory muscle use  Heart: S1,S2 present, RRR      A/P:  -ordered EKG  -Spoke with [employer] and reviewed event, likely artifact as pulse on PulseOx remained stable.  -Pt was asleep at time of event, currently asymptomatic  -Cardiology is following case

## 2024-06-05 NOTE — PROGRESS NOTE ADULT - ASSESSMENT
Assessment:  Richard Davila is a 89 year old man with past medical history of Coronary artery disease (s/p CABG & PCIs), HFrEF (LVEF 30%), Atrial fibrillation (on Xarelto), TAVR, Permanent pacemaker, Hypertension, Chronic kidney disease, Prostate cancer (s/p surgery) brought in by EMS from assisted living facility due to progressive dyspnea and leg swelling, found to have signs of congestive heart failure with pneumonia.    ECG consistent with ventricular paced rhythm. Troponins negative x 2. CT chest consistent with bilateral lung opacities and small loculated and nodular bilateral pleural effusions. Pro BNP elevated, Cr elevated. Leg US negative for DVT.    Recommendations:  [] HFrEF: Family at bedside have notes from recent cardiologist office, Dr. Richard Avitia at [facility] which indicates patient has LVEF of 30%. TTE here consistent with LVEF 40%. He was on Torsemide 20 mg PO daily. He appears near euvolemic, saturating 99% on room air, would repeat pro BNP and consider transition to Torsemide 40 mg PO daily tomorrow. If renal function improves, would benefit from Entresto. Continue Metoprolol succinate. Recommend Visual IQ PPM interrogation (tele tech made aware, awaiting results).    [] Atrial fibrillation: Continue Xarelto for stroke prophylaxis  [] CAD s/p CABG and PCI: Appears stable. Continue Plavix and high intensity statin  [] Pneumonia: Blood cultures initially positive, now negative, follow up Pulmonary, patient receiving antibiotics    Updated primary team. Will sign out this case to cardiologist to follow along tomorrow.    Natasha Bains MD  Cardiology

## 2024-06-05 NOTE — PROGRESS NOTE ADULT - SUBJECTIVE AND OBJECTIVE BOX
DIANA CARVER  [id number]      Chief Complaint: Congestive heart failure/Pneumonia    Interval History: The patient reports breathing is stable.    Tele: ventricular paced 70s BPM      Current meds:   acetaminophen     Tablet .. 650 milliGRAM(s) Oral every 6 hours PRN  aluminum hydroxide/magnesium hydroxide/simethicone Suspension 30 milliLiter(s) Oral every 4 hours PRN  atorvastatin 40 milliGRAM(s) Oral at bedtime  clopidogrel Tablet 75 milliGRAM(s) Oral daily  dextrose 10% Bolus 125 milliLiter(s) IV Bolus once  dextrose 5%. 1000 milliLiter(s) IV Continuous <Continuous>  dextrose 5%. 1000 milliLiter(s) IV Continuous <Continuous>  dextrose 50% Injectable 12.5 Gram(s) IV Push once  dextrose 50% Injectable 25 Gram(s) IV Push once  dextrose Oral Gel 15 Gram(s) Oral once PRN  doxazosin 2 milliGRAM(s) Oral daily  furosemide   Injectable 40 milliGRAM(s) IV Push daily  glucagon  Injectable 1 milliGRAM(s) IntraMuscular once  insulin lispro (ADMELOG) corrective regimen sliding scale   SubCutaneous three times a day before meals  insulin lispro (ADMELOG) corrective regimen sliding scale   SubCutaneous at bedtime  latanoprost 0.005% Ophthalmic Solution 1 Drop(s) Both EYES at bedtime  loratadine 10 milliGRAM(s) Oral daily  magnesium oxide 400 milliGRAM(s) Oral three times a day with meals  melatonin 3 milliGRAM(s) Oral at bedtime PRN  metoprolol succinate ER 50 milliGRAM(s) Oral daily  multivitamin 1 Tablet(s) Oral daily  ondansetron Injectable 4 milliGRAM(s) IV Push every 8 hours PRN  potassium chloride   Powder 40 milliEquivalent(s) Oral every 2 hours  rivaroxaban 15 milliGRAM(s) Oral with dinner      Objective:       Vital Signs:   T(C): 36.6 (06-05-24 @ 05:04), Max: 36.8 (06-04-24 @ 19:25)  HR: 72 (06-05-24 @ 05:04) (69 - 104)  BP: 145/69 (06-05-24 @ 05:04) (136/69 - 156/80)  RR: 16 (06-05-24 @ 05:04) (16 - 18)  SpO2: 99% (06-05-24 @ 05:04) (96% - 99%)    Physical Exam:   General: no distress  HEENT: sclera anicteric  Neck: supple  CVS: JVP ~ 10 cm H20, irregularly irregular, s1, s2  Chest: unlabored respirations, coarse breath sounds  Abdomen: non-distended  Extremities: chronic lower extremity venous skin changes  Neuro: awake, alert & oriented  Psych: normal affect        Labs:   05 Jun 2024 05:55    136    |  101    |  44     ----------------------------<  124    3.1     |  25     |  1.55     Ca    8.9        05 Jun 2024 05:55  Mg     1.9       05 Jun 2024 05:55                            10.2   5.85  )-----------( 154      ( 05 Jun 2024 05:55 )             32.0             TTE Report from [place] (2021):  LVEF 55-60%  Normal RV systolic function  S/p TAVR, normally functioning   Mild MR, Mild TR  No pericardial effusion       TTE (6/3/24):   1. Technically difficult study with poor endocardial visualization.   2. The left ventricle endocardium is not well visualized, appears to   have moderately reduced LV systolic function in limited views, estimated visual LVEF 40%. Abnormal septal motion likely due to paced rhythm. Consider use of IV ultrasonic enhancing agent to better evaluate endocardium and for more accurate assessment of LVEF, if clinically indicated.   3. Mildly increased LV wall thickness.   4. Normal left ventricular internal cavity size.   5. The right ventricle is not well visualized, appears dilated.   6. Mild mitral valve regurgitation.   7. Mild tricuspid regurgitation.   8. There is a bioprosthetic valve in the aortic postion, appears well   seated with peak velocity within normal limits.   9. The pericardium was not well visualized.  10. Subcostal window not well visualized.        ECG (6/1/24): ventricular paced

## 2024-06-05 NOTE — PROGRESS NOTE ADULT - NS ATTEND AMEND GEN_ALL_CORE FT
89 y/o M with PMH HTN, CAD with PPM, PVD, Type 2 DM, CKD3 essential tremor, hx prostate cancer, presented from [facility] c/o SOB admitted for acute on chronic CHF exacerbation. PNA, PE ruled out. Off antibiotics, monitor for fevers, repeat blood cx 6/3 NGTD. cardio appreciated - transition to po torsemide 6/6, start entresto. possible dc home 48h

## 2024-06-05 NOTE — PROGRESS NOTE ADULT - ASSESSMENT
Physical Examination:  GENERAL:               Alert, Oriented, No acute distress.    HEENT:                    No JVD, Dry MM  PULM:                     Bilateral air entry, Clear to auscultation bilaterally, no significant sputum production, bibasilarRales, No Rhonchi, No Wheezing  CVS:                         S1, S2,  + Murmur  ABD:                        Soft, nondistended, nontender, normoactive bowel sounds,   EXT:                         Chronic LE venous stasis and edema, nontender, No Cyanosis or Clubbing   NEURO:                  Alert, oriented, interactive, nonfocal, follows commands  PSYC:                      Calm, + Insight.      Assessment  89 year old male with history of lung cancer (unsure weather right or left) s/p ? resection denies chemo and unsure of radiation, non smoker, denies h/o COPD/Asthma, HTN, CAD with pacemaker s/p cabg, PVD, T2DM, essential tremor, hx prostate cancer, presenting from [facility] for concern of shortness of breath/GRANADOS      Problem List  1. Dyspnea suspect cardiogenic causes,   2. Chronic cough, possible from loculated pl effusions vs seasonal allergies  3. Abnormal CT chest with -       Ill-defined patchy and clustered bilateral lung opacities.       Left lower lobe subpleural 4 mm nodule versus intrapulmonary lymph node.       Right lung postsurgical changes.       Small loculated and nodular bilateral pleural effusions      Ill-defined patchy and clustered bilateral lung opacities.     Plan   Finish course empiric antibiotics ill defined patchy infiltrates  Loculated effusion on right could be post surgical and chronic, requested family to obtain old imaging  left lower lobe subpleural 4 mm nodule can consider repeat CT chest in 6-12 months if desire for cancer workup desired  Continue Claritin and Tessalon   nebs prn  pt comfortable on room air  dispo planning can be considered   d/w Dr Escobar

## 2024-06-05 NOTE — PROGRESS NOTE ADULT - SUBJECTIVE AND OBJECTIVE BOX
Follow-up Pulmonary Progress Note  Chief Complaint : Acute on chronic systolic congestive heart failure      patient seen and examined  comfortable  no cp, sob, aplp, n/v  on room air no complaints    Allergies :No Known Allergies      PAST MEDICAL & SURGICAL HISTORY:  Pacemaker    CAD (coronary artery disease)    HTN (hypertension)    Diabetes        Medications:  MEDICATIONS  (STANDING):  atorvastatin 40 milliGRAM(s) Oral at bedtime  clopidogrel Tablet 75 milliGRAM(s) Oral daily  dextrose 10% Bolus 125 milliLiter(s) IV Bolus once  dextrose 5%. 1000 milliLiter(s) (100 mL/Hr) IV Continuous <Continuous>  dextrose 5%. 1000 milliLiter(s) (50 mL/Hr) IV Continuous <Continuous>  dextrose 50% Injectable 12.5 Gram(s) IV Push once  dextrose 50% Injectable 25 Gram(s) IV Push once  doxazosin 2 milliGRAM(s) Oral daily  furosemide   Injectable 40 milliGRAM(s) IV Push daily  glucagon  Injectable 1 milliGRAM(s) IntraMuscular once  insulin lispro (ADMELOG) corrective regimen sliding scale   SubCutaneous three times a day before meals  insulin lispro (ADMELOG) corrective regimen sliding scale   SubCutaneous at bedtime  latanoprost 0.005% Ophthalmic Solution 1 Drop(s) Both EYES at bedtime  loratadine 10 milliGRAM(s) Oral daily  magnesium oxide 400 milliGRAM(s) Oral three times a day with meals  metoprolol succinate ER 50 milliGRAM(s) Oral daily  multivitamin 1 Tablet(s) Oral daily  potassium chloride   Powder 40 milliEquivalent(s) Oral every 2 hours  rivaroxaban 15 milliGRAM(s) Oral with dinner    MEDICATIONS  (PRN):  acetaminophen     Tablet .. 650 milliGRAM(s) Oral every 6 hours PRN Temp greater or equal to 38C (100.4F), Mild Pain (1 - 3)  aluminum hydroxide/magnesium hydroxide/simethicone Suspension 30 milliLiter(s) Oral every 4 hours PRN Dyspepsia  dextrose Oral Gel 15 Gram(s) Oral once PRN Blood Glucose LESS THAN 70 milliGRAM(s)/deciliter  melatonin 3 milliGRAM(s) Oral at bedtime PRN Insomnia  ondansetron Injectable 4 milliGRAM(s) IV Push every 8 hours PRN Nausea and/or Vomiting      Antibiotics History  cefTRIAXone   IVPB 1000 milliGRAM(s) IV Intermittent once, 06-01-24 @ 18:47, Stop order after: 1 Doses  cefTRIAXone   IVPB 1000 milliGRAM(s) IV Intermittent every 24 hours, 06-02-24 @ 18:00, Stop order after: 5 Days  doxycycline IVPB    , 06-01-24 @ 21:33  doxycycline IVPB 100 milliGRAM(s) IV Intermittent once, 06-01-24 @ 20:24  doxycycline IVPB 100 milliGRAM(s) IV Intermittent every 12 hours, 06-02-24 @ 06:00      Heme Medications   clopidogrel Tablet 75 milliGRAM(s) Oral daily, 06-01-24 @ 21:04  rivaroxaban 15 milliGRAM(s) Oral with dinner, 06-02-24 @ 12:39      GI Medications  aluminum hydroxide/magnesium hydroxide/simethicone Suspension 30 milliLiter(s) Oral every 4 hours, 06-01-24 @ 20:30, Routine PRN        LABS:                        10.2   5.85  )-----------( 154      ( 05 Jun 2024 05:55 )             32.0     06-05    136  |  101  |  44<H>  ----------------------------<  124<H>  3.1<L>   |  25  |  1.55<H>    Ca    8.9      05 Jun 2024 05:55  Mg     1.9     06-05      HIT ab -- 06-01 @ 13:50  HIT ab EIA --  D Dimer -686            Urinalysis Basic - ( 05 Jun 2024 05:55 )    Color: x / Appearance: x / SG: x / pH: x  Gluc: 124 mg/dL / Ketone: x  / Bili: x / Urobili: x   Blood: x / Protein: x / Nitrite: x   Leuk Esterase: x / RBC: x / WBC x   Sq Epi: x / Non Sq Epi: x / Bacteria: x              CULTURES: (if applicable)    Culture - Blood (collected 06-03-24 @ 13:03)  Source: .Blood Blood-Peripheral  Preliminary Report (06-04-24 @ 20:02):    No growth at 24 hours    Culture - Blood (collected 06-03-24 @ 13:03)  Source: .Blood Blood-Venous  Preliminary Report (06-04-24 @ 20:02):    No growth at 24 hours    Culture - Blood (collected 06-01-24 @ 18:50)  Source: .Blood Blood-Peripheral  Gram Stain (06-04-24 @ 03:44):    Growth in anaerobic bottle: Gram Positive Cocci in Clusters  Final Report (06-05-24 @ 10:57):    Growth in anaerobic bottle: Staphylococcus capitis    Isolation of Coagulase negative Staphylococcus from single blood culture    sets may represent    contamination. Contact the Microbiology Department at [phone] if    susceptibility testing is    clinically indicated.    Culture - Blood (collected 06-01-24 @ 18:47)  Source: .Blood Blood-Peripheral  Gram Stain (06-02-24 @ 19:11):    Growth in aerobic bottle: Gram positive cocci in pairs  Final Report (06-03-24 @ 17:35):    Growth in aerobic bottle: Staphylococcus haemolyticus    Isolation of Coagulase negative Staphylococcus from single blood culture    sets may represent    contamination. Contact the Microbiology Department at [phone] if    susceptibility testing is    clinically indicated.    Direct identification is available within approximately 3-5    hours either by Blood Panel Multiplexed PCR or Direct    MALDI-TOF. Details: [email or web address]  Organism: Blood Culture PCR (06-03-24 @ 17:35)  Organism: Blood Culture PCR (06-03-24 @ 17:35)      Method Type: PCR      -  Coagulase negative Staphylococcus: Detec      Rapid RVP Result: NotDetec (06-02-24 @ 21:05)  Rapid RVP Result: NotDetec (06-01-24 @ 20:40)             VITALS:  T(C): 36.6 (06-05-24 @ 05:04), Max: 36.8 (06-04-24 @ 19:25)  T(F): 97.9 (06-05-24 @ 05:04), Max: 98.3 (06-04-24 @ 19:25)  HR: 72 (06-05-24 @ 10:22) (69 - 104)  BP: 136/76 (06-05-24 @ 10:22) (136/69 - 156/80)  BP(mean): --  ABP: --  ABP(mean): --  RR: 16 (06-05-24 @ 05:04) (16 - 18)  SpO2: 99% (06-05-24 @ 10:22) (96% - 99%)  CVP(mm Hg): --  CVP(cm H2O): --    Ins and Outs     06-04-24 @ 07:01  -  06-05-24 @ 07:00  --------------------------------------------------------  IN: 0 mL / OUT: 350 mL / NET: -350 mL        Height (cm): 175.3 (06-02-24 @ 22:16)        I&O's Detail    04 Jun 2024 07:01  -  05 Jun 2024 07:00  --------------------------------------------------------  IN:  Total IN: 0 mL    OUT:    Incontinent per Condom Catheter (mL): 350 mL  Total OUT: 350 mL    Total NET: -350 mL

## 2024-06-05 NOTE — PROGRESS NOTE ADULT - SUBJECTIVE AND OBJECTIVE BOX
Patient is a 89y old  Male who presents with a chief complaint of SOB (04 Jun 2024 22:13)    Patient seen and examined at bedside.  no acute overnight events    ALLERGIES:  No Known Allergies        Vital Signs Last 24 Hrs  T(F): 97.9 (05 Jun 2024 05:04), Max: 98.3 (04 Jun 2024 19:25)  HR: 72 (05 Jun 2024 05:04) (69 - 104)  BP: 145/69 (05 Jun 2024 05:04) (136/69 - 156/80)  RR: 16 (05 Jun 2024 05:04) (16 - 18)  SpO2: 99% (05 Jun 2024 05:04) (96% - 99%)  I&O's Summary    04 Jun 2024 07:01  -  05 Jun 2024 07:00  --------------------------------------------------------  IN: 0 mL / OUT: 350 mL / NET: -350 mL      MEDICATIONS:  acetaminophen     Tablet .. 650 milliGRAM(s) Oral every 6 hours PRN  aluminum hydroxide/magnesium hydroxide/simethicone Suspension 30 milliLiter(s) Oral every 4 hours PRN  atorvastatin 40 milliGRAM(s) Oral at bedtime  clopidogrel Tablet 75 milliGRAM(s) Oral daily  dextrose 10% Bolus 125 milliLiter(s) IV Bolus once  dextrose 5%. 1000 milliLiter(s) IV Continuous <Continuous>  dextrose 5%. 1000 milliLiter(s) IV Continuous <Continuous>  dextrose 50% Injectable 25 Gram(s) IV Push once  dextrose 50% Injectable 12.5 Gram(s) IV Push once  dextrose Oral Gel 15 Gram(s) Oral once PRN  doxazosin 2 milliGRAM(s) Oral daily  furosemide   Injectable 40 milliGRAM(s) IV Push daily  glucagon  Injectable 1 milliGRAM(s) IntraMuscular once  insulin lispro (ADMELOG) corrective regimen sliding scale   SubCutaneous at bedtime  insulin lispro (ADMELOG) corrective regimen sliding scale   SubCutaneous three times a day before meals  latanoprost 0.005% Ophthalmic Solution 1 Drop(s) Both EYES at bedtime  loratadine 10 milliGRAM(s) Oral daily  magnesium oxide 400 milliGRAM(s) Oral three times a day with meals  melatonin 3 milliGRAM(s) Oral at bedtime PRN  metoprolol succinate ER 50 milliGRAM(s) Oral daily  multivitamin 1 Tablet(s) Oral daily  ondansetron Injectable 4 milliGRAM(s) IV Push every 8 hours PRN  potassium chloride   Powder 40 milliEquivalent(s) Oral every 2 hours  rivaroxaban 15 milliGRAM(s) Oral with dinner      PHYSICAL EXAM:  General: NAD, A/O x 3  ENT: MMM, no oral thrush  Neck: Supple, No JVD  Lungs: Clear to auscultation bilaterally, non labored, bilateral air entry  Cardio: S1S2 irregular  Abdomen: Soft, Nontender, Nondistended; Bowel sounds present  Extremities: No cyanosis, Chronic LE skin changes    LABS:                        10.2   5.85  )-----------( 154      ( 05 Jun 2024 05:55 )             32.0     06-05    136  |  101  |  44  ----------------------------<  124  3.1   |  25  |  1.55    Ca    8.9      05 Jun 2024 05:55  Mg     1.9     06-05                              POCT Blood Glucose.: 155 mg/dL (05 Jun 2024 08:14)  POCT Blood Glucose.: 203 mg/dL (04 Jun 2024 21:27)  POCT Blood Glucose.: 149 mg/dL (04 Jun 2024 16:38)  POCT Blood Glucose.: 212 mg/dL (04 Jun 2024 12:00)      Urinalysis Basic - ( 05 Jun 2024 05:55 )    Color: x / Appearance: x / SG: x / pH: x  Gluc: 124 mg/dL / Ketone: x  / Bili: x / Urobili: x   Blood: x / Protein: x / Nitrite: x   Leuk Esterase: x / RBC: x / WBC x   Sq Epi: x / Non Sq Epi: x / Bacteria: x        Culture - Blood (collected 03 Jun 2024 13:03)  Source: .Blood Blood-Peripheral  Preliminary Report (04 Jun 2024 20:02):    No growth at 24 hours    Culture - Blood (collected 03 Jun 2024 13:03)  Source: .Blood Blood-Venous  Preliminary Report (04 Jun 2024 20:02):    No growth at 24 hours    Culture - Blood (collected 01 Jun 2024 18:50)  Source: .Blood Blood-Peripheral  Gram Stain (04 Jun 2024 03:44):    Growth in anaerobic bottle: Gram Positive Cocci in Clusters  Preliminary Report (04 Jun 2024 03:44):    Growth in anaerobic bottle: Gram Positive Cocci in Clusters    Culture - Blood (collected 01 Jun 2024 18:47)  Source: .Blood Blood-Peripheral  Gram Stain (02 Jun 2024 19:11):    Growth in aerobic bottle: Gram positive cocci in pairs  Final Report (03 Jun 2024 17:35):    Growth in aerobic bottle: Staphylococcus haemolyticus    Isolation of Coagulase negative Staphylococcus from single blood culture    sets may represent    contamination. Contact the Microbiology Department at [phone] if    susceptibility testing is    clinically indicated.    Direct identification is available within approximately 3-5    hours either by Blood Panel Multiplexed PCR or Direct    MALDI-TOF. Details: [email or web address]  Organism: Blood Culture PCR (03 Jun 2024 17:35)  Organism: Blood Culture PCR (03 Jun 2024 17:35)      Method Type: PCR      -  Coagulase negative Staphylococcus: Detec          RADIOLOGY & ADDITIONAL TESTS:    Care Discussed with Consultants/Other Providers:

## 2024-06-05 NOTE — PROGRESS NOTE ADULT - SUBJECTIVE AND OBJECTIVE BOX
No distress    Vital Signs Last 24 Hrs  T(C): 36.4 (06-05-24 @ 19:23), Max: 36.6 (06-05-24 @ 05:04)  T(F): 97.5 (06-05-24 @ 19:23), Max: 97.9 (06-05-24 @ 05:04)  HR: 72 (06-05-24 @ 19:23) (70 - 72)  BP: 125/83 (06-05-24 @ 19:23) (125/83 - 148/80)  RR: 16 (06-05-24 @ 19:23) (16 - 16)  SpO2: 96% (06-05-24 @ 19:23) (96% - 99%)    I&O's Detail    05 Jun 2024 07:01  -  05 Jun 2024 22:44  --------------------------------------------------------  OUT:    Incontinent per Condom Catheter (mL): 500 mL    Voided (mL): 300 mL  Total OUT: 800 mL    s1s2  b/l air entry  soft, ND  + edema                                       10.2   5.85  )-----------( 154      ( 05 Jun 2024 05:55 )             32.0     05 Jun 2024 05:55    136    |  101    |  44     ----------------------------<  124    3.1     |  25     |  1.55     Ca    8.9        05 Jun 2024 05:55  Mg     1.9       05 Jun 2024 05:55    Culture - Blood (collected 03 Jun 2024 13:03)  Source: .Blood Blood-Peripheral  Preliminary Report (05 Jun 2024 20:01):    No growth at 48 Hours    Culture - Blood (collected 03 Jun 2024 13:03)  Source: .Blood Blood-Venous  Preliminary Report (05 Jun 2024 20:01):    No growth at 48 Hours    acetaminophen     Tablet .. 650 milliGRAM(s) Oral every 6 hours PRN  aluminum hydroxide/magnesium hydroxide/simethicone Suspension 30 milliLiter(s) Oral every 4 hours PRN  atorvastatin 40 milliGRAM(s) Oral at bedtime  clopidogrel Tablet 75 milliGRAM(s) Oral daily  dextrose 10% Bolus 125 milliLiter(s) IV Bolus once  dextrose 5%. 1000 milliLiter(s) IV Continuous <Continuous>  dextrose 5%. 1000 milliLiter(s) IV Continuous <Continuous>  dextrose 50% Injectable 25 Gram(s) IV Push once  dextrose 50% Injectable 12.5 Gram(s) IV Push once  dextrose Oral Gel 15 Gram(s) Oral once PRN  doxazosin 2 milliGRAM(s) Oral daily  furosemide   Injectable 40 milliGRAM(s) IV Push daily  glucagon  Injectable 1 milliGRAM(s) IntraMuscular once  insulin lispro (ADMELOG) corrective regimen sliding scale   SubCutaneous three times a day before meals  insulin lispro (ADMELOG) corrective regimen sliding scale   SubCutaneous at bedtime  latanoprost 0.005% Ophthalmic Solution 1 Drop(s) Both EYES at bedtime  loratadine 10 milliGRAM(s) Oral daily  magnesium oxide 400 milliGRAM(s) Oral three times a day with meals  melatonin 3 milliGRAM(s) Oral at bedtime PRN  metoprolol succinate ER 50 milliGRAM(s) Oral daily  multivitamin 1 Tablet(s) Oral daily  ondansetron Injectable 4 milliGRAM(s) IV Push every 8 hours PRN  rivaroxaban 15 milliGRAM(s) Oral with dinner    A/P:    CM, EF ~ 40%  Adm w/edema, SOB  Baseline Cr is unknown  Overall stable renal indices  Continue Lasix   SONO w/o hydro  UA - bland  Avoid nephrotoxins   F/u BMP, UO    [phone]

## 2024-06-06 ENCOUNTER — TRANSCRIPTION ENCOUNTER (OUTPATIENT)
Age: 89
End: 2024-06-06

## 2024-06-06 LAB
ANION GAP SERPL CALC-SCNC: 10 MMOL/L — SIGNIFICANT CHANGE UP (ref 5–17)
BUN SERPL-MCNC: 45 MG/DL — HIGH (ref 7–23)
CALCIUM SERPL-MCNC: 8.9 MG/DL — SIGNIFICANT CHANGE UP (ref 8.4–10.5)
CHLORIDE SERPL-SCNC: 104 MMOL/L — SIGNIFICANT CHANGE UP (ref 96–108)
CO2 SERPL-SCNC: 25 MMOL/L — SIGNIFICANT CHANGE UP (ref 22–31)
CREAT SERPL-MCNC: 1.58 MG/DL — HIGH (ref 0.5–1.3)
EGFR: 39 ML/MIN/1.73M2 — LOW
GLUCOSE BLDC GLUCOMTR-MCNC: 172 MG/DL — HIGH (ref 70–99)
GLUCOSE BLDC GLUCOMTR-MCNC: 177 MG/DL — HIGH (ref 70–99)
GLUCOSE BLDC GLUCOMTR-MCNC: 178 MG/DL — HIGH (ref 70–99)
GLUCOSE BLDC GLUCOMTR-MCNC: 183 MG/DL — HIGH (ref 70–99)
GLUCOSE SERPL-MCNC: 146 MG/DL — HIGH (ref 70–99)
MAGNESIUM SERPL-MCNC: 1.9 MG/DL — SIGNIFICANT CHANGE UP (ref 1.6–2.6)
NT-PROBNP SERPL-SCNC: 2118 PG/ML — HIGH (ref 0–300)
POTASSIUM SERPL-MCNC: 3.8 MMOL/L — SIGNIFICANT CHANGE UP (ref 3.5–5.3)
POTASSIUM SERPL-SCNC: 3.8 MMOL/L — SIGNIFICANT CHANGE UP (ref 3.5–5.3)
SARS-COV-2 RNA SPEC QL NAA+PROBE: SIGNIFICANT CHANGE UP
SODIUM SERPL-SCNC: 139 MMOL/L — SIGNIFICANT CHANGE UP (ref 135–145)

## 2024-06-06 PROCEDURE — 99232 SBSQ HOSP IP/OBS MODERATE 35: CPT

## 2024-06-06 PROCEDURE — 99233 SBSQ HOSP IP/OBS HIGH 50: CPT

## 2024-06-06 RX ORDER — METOPROLOL TARTRATE 50 MG
1 TABLET ORAL
Qty: 30 | Refills: 0
Start: 2024-06-06 | End: 2024-07-05

## 2024-06-06 RX ORDER — METOPROLOL TARTRATE 50 MG
0.5 TABLET ORAL
Refills: 0 | DISCHARGE

## 2024-06-06 RX ORDER — FUROSEMIDE 40 MG
2 TABLET ORAL
Refills: 0 | DISCHARGE

## 2024-06-06 RX ORDER — RIVAROXABAN 15 MG-20MG
1 KIT ORAL
Qty: 30 | Refills: 0
Start: 2024-06-06 | End: 2024-07-05

## 2024-06-06 RX ORDER — SACUBITRIL AND VALSARTAN 24; 26 MG/1; MG/1
1 TABLET, FILM COATED ORAL
Refills: 0 | Status: DISCONTINUED | OUTPATIENT
Start: 2024-06-06 | End: 2024-06-07

## 2024-06-06 RX ADMIN — MAGNESIUM OXIDE 400 MG ORAL TABLET 400 MILLIGRAM(S): 241.3 TABLET ORAL at 17:11

## 2024-06-06 RX ADMIN — LORATADINE 10 MILLIGRAM(S): 10 TABLET ORAL at 12:50

## 2024-06-06 RX ADMIN — Medication 40 MILLIGRAM(S): at 06:31

## 2024-06-06 RX ADMIN — MAGNESIUM OXIDE 400 MG ORAL TABLET 400 MILLIGRAM(S): 241.3 TABLET ORAL at 08:30

## 2024-06-06 RX ADMIN — SACUBITRIL AND VALSARTAN 1 TABLET(S): 24; 26 TABLET, FILM COATED ORAL at 17:11

## 2024-06-06 RX ADMIN — Medication 2: at 17:11

## 2024-06-06 RX ADMIN — Medication 1 TABLET(S): at 12:51

## 2024-06-06 RX ADMIN — ATORVASTATIN CALCIUM 40 MILLIGRAM(S): 80 TABLET, FILM COATED ORAL at 21:57

## 2024-06-06 RX ADMIN — MAGNESIUM OXIDE 400 MG ORAL TABLET 400 MILLIGRAM(S): 241.3 TABLET ORAL at 12:52

## 2024-06-06 RX ADMIN — LATANOPROST 1 DROP(S): 0.05 SOLUTION/ DROPS OPHTHALMIC; TOPICAL at 21:58

## 2024-06-06 RX ADMIN — RIVAROXABAN 15 MILLIGRAM(S): KIT at 17:11

## 2024-06-06 RX ADMIN — Medication 2: at 12:36

## 2024-06-06 RX ADMIN — CLOPIDOGREL BISULFATE 75 MILLIGRAM(S): 75 TABLET, FILM COATED ORAL at 12:50

## 2024-06-06 RX ADMIN — Medication 2: at 08:49

## 2024-06-06 RX ADMIN — Medication 50 MILLIGRAM(S): at 07:00

## 2024-06-06 RX ADMIN — Medication 2 MILLIGRAM(S): at 06:31

## 2024-06-06 NOTE — DISCHARGE NOTE PROVIDER - ATTENDING DISCHARGE PHYSICAL EXAMINATION:
Gen: pt sitting up in bed in NAD  HENT: NC/AT, MMM  Neck: supple, symmetric  Lungs: nonlabored breathing, no resp distress  Abd: soft, nontender  MSK: no cyanosis or calf tenderness  Psych: appropriate affect, A&Ox3

## 2024-06-06 NOTE — DISCHARGE NOTE PROVIDER - NSDCCPCAREPLAN_GEN_ALL_CORE_FT
PRINCIPAL DISCHARGE DIAGNOSIS  Diagnosis: Acute on chronic systolic congestive heart failure  Assessment and Plan of Treatment: you were followed closely by cardiology and your medications were adjusted.  please follow up [facility] cardiology when you are able     PRINCIPAL DISCHARGE DIAGNOSIS  Diagnosis: Acute on chronic systolic congestive heart failure  Assessment and Plan of Treatment: -you were followed closely by cardiology and your medications were adjusted, you are on Entresto, Torsemide and Metoprolol  -continue low salt and no added salt diet  -if you gain more than 2 lbs in 24 hours, have increasing shortness of breath and leg swelling call your MD immediately  -please follow up wtih your Cardiologist within the next 7-10 days     PRINCIPAL DISCHARGE DIAGNOSIS  Diagnosis: Acute on chronic systolic congestive heart failure  Assessment and Plan of Treatment:   -you were followed closely by Cardiology during this hospital admission and your medications were adjusted, for discharge you are on Entresto, Torsemide (So[place]anz) and Metoprolol  -Farxiga not started during this admission due to soft blood pressures and Entresto just started, possibly start Farxiga as an outpt  -continue low salt and no added salt diet  -if you gain more than 2 lbs in 24 hours, have increasing shortness of breath and leg swelling call your MD immediately  -please follow up wtih your Cardiologist within the next 7-10 days      SECONDARY DISCHARGE DIAGNOSES  Diagnosis: Chronic atrial fibrillation  Assessment and Plan of Treatment: -continue anticoagulation for stroke prevention  -Cardiology f/u as an outpatient

## 2024-06-06 NOTE — PROGRESS NOTE ADULT - SUBJECTIVE AND OBJECTIVE BOX
Follow-up Pulmonary Progress Note  Chief Complaint : Acute on chronic systolic congestive heart failure      Denies SOB/CP.   on room air      Allergies :No Known Allergies      PAST MEDICAL & SURGICAL HISTORY:  Pacemaker    CAD (coronary artery disease)    HTN (hypertension)    Diabetes        Medications:  MEDICATIONS  (STANDING):  atorvastatin 40 milliGRAM(s) Oral at bedtime  clopidogrel Tablet 75 milliGRAM(s) Oral daily  dextrose 10% Bolus 125 milliLiter(s) IV Bolus once  dextrose 5%. 1000 milliLiter(s) (100 mL/Hr) IV Continuous <Continuous>  dextrose 5%. 1000 milliLiter(s) (50 mL/Hr) IV Continuous <Continuous>  dextrose 50% Injectable 25 Gram(s) IV Push once  dextrose 50% Injectable 12.5 Gram(s) IV Push once  doxazosin 2 milliGRAM(s) Oral daily  glucagon  Injectable 1 milliGRAM(s) IntraMuscular once  insulin lispro (ADMELOG) corrective regimen sliding scale   SubCutaneous three times a day before meals  insulin lispro (ADMELOG) corrective regimen sliding scale   SubCutaneous at bedtime  latanoprost 0.005% Ophthalmic Solution 1 Drop(s) Both EYES at bedtime  loratadine 10 milliGRAM(s) Oral daily  magnesium oxide 400 milliGRAM(s) Oral three times a day with meals  metoprolol succinate ER 50 milliGRAM(s) Oral daily  multivitamin 1 Tablet(s) Oral daily  rivaroxaban 15 milliGRAM(s) Oral with dinner  sacubitril 24 mG/valsartan 26 mG 1 Tablet(s) Oral two times a day  torsemide 40 milliGRAM(s) Oral daily    MEDICATIONS  (PRN):  acetaminophen     Tablet .. 650 milliGRAM(s) Oral every 6 hours PRN Temp greater or equal to 38C (100.4F), Mild Pain (1 - 3)  aluminum hydroxide/magnesium hydroxide/simethicone Suspension 30 milliLiter(s) Oral every 4 hours PRN Dyspepsia  dextrose Oral Gel 15 Gram(s) Oral once PRN Blood Glucose LESS THAN 70 milliGRAM(s)/deciliter  melatonin 3 milliGRAM(s) Oral at bedtime PRN Insomnia  ondansetron Injectable 4 milliGRAM(s) IV Push every 8 hours PRN Nausea and/or Vomiting      Antibiotics History  cefTRIAXone   IVPB 1000 milliGRAM(s) IV Intermittent every 24 hours, 06-02-24 @ 18:00, Stop order after: 5 Days  cefTRIAXone   IVPB 1000 milliGRAM(s) IV Intermittent once, 06-01-24 @ 18:47, Stop order after: 1 Doses  doxycycline IVPB    , 06-01-24 @ 21:33  doxycycline IVPB 100 milliGRAM(s) IV Intermittent every 12 hours, 06-02-24 @ 06:00  doxycycline IVPB 100 milliGRAM(s) IV Intermittent once, 06-01-24 @ 20:24      Heme Medications   clopidogrel Tablet 75 milliGRAM(s) Oral daily, 06-01-24 @ 21:04  rivaroxaban 15 milliGRAM(s) Oral with dinner, 06-02-24 @ 12:39      GI Medications  aluminum hydroxide/magnesium hydroxide/simethicone Suspension 30 milliLiter(s) Oral every 4 hours, 06-01-24 @ 20:30, Routine PRN        LABS:                        10.2   5.85  )-----------( 154      ( 05 Jun 2024 05:55 )             32.0     06-06    139  |  104  |  45<H>  ----------------------------<  146<H>  3.8   |  25  |  1.58<H>    Ca    8.9      06 Jun 2024 07:05  Mg     1.9     06-06      HIT ab -- 06-01 @ 13:50  HIT ab EIA --  D Dimer -686            Urinalysis Basic - ( 06 Jun 2024 07:05 )    Color: x / Appearance: x / SG: x / pH: x  Gluc: 146 mg/dL / Ketone: x  / Bili: x / Urobili: x   Blood: x / Protein: x / Nitrite: x   Leuk Esterase: x / RBC: x / WBC x   Sq Epi: x / Non Sq Epi: x / Bacteria: x              CULTURES: (if applicable)    Culture - Blood (collected 06-03-24 @ 13:03)  Source: .Blood Blood-Peripheral  Preliminary Report (06-05-24 @ 20:01):    No growth at 48 Hours    Culture - Blood (collected 06-03-24 @ 13:03)  Source: .Blood Blood-Venous  Preliminary Report (06-05-24 @ 20:01):    No growth at 48 Hours    Culture - Blood (collected 06-01-24 @ 18:50)  Source: .Blood Blood-Peripheral  Gram Stain (06-04-24 @ 03:44):    Growth in anaerobic bottle: Gram Positive Cocci in Clusters  Final Report (06-05-24 @ 10:57):    Growth in anaerobic bottle: Staphylococcus capitis    Isolation of Coagulase negative Staphylococcus from single blood culture    sets may represent    contamination. Contact the Microbiology Department at [phone] if    susceptibility testing is    clinically indicated.    Culture - Blood (collected 06-01-24 @ 18:47)  Source: .Blood Blood-Peripheral  Gram Stain (06-02-24 @ 19:11):    Growth in aerobic bottle: Gram positive cocci in pairs  Final Report (06-03-24 @ 17:35):    Growth in aerobic bottle: Staphylococcus haemolyticus    Isolation of Coagulase negative Staphylococcus from single blood culture    sets may represent    contamination. Contact the Microbiology Department at [phone] if    susceptibility testing is    clinically indicated.    Direct identification is available within approximately 3-5    hours either by Blood Panel Multiplexed PCR or Direct    MALDI-TOF. Details: [email or web address]  Organism: Blood Culture PCR (06-03-24 @ 17:35)  Organism: Blood Culture PCR (06-03-24 @ 17:35)      Method Type: PCR      -  Coagulase negative Staphylococcus: Detec      Rapid RVP Result: NotDetec (06-02-24 @ 21:05)  Rapid RVP Result: NotDetec (06-01-24 @ 20:40)           VITALS:  T(C): 36.6 (06-06-24 @ 05:15), Max: 36.6 (06-06-24 @ 05:15)  T(F): 97.8 (06-06-24 @ 05:15), Max: 97.8 (06-06-24 @ 05:15)  HR: 72 (06-06-24 @ 05:15) (70 - 72)  BP: 152/86 (06-06-24 @ 05:15) (125/83 - 152/86)  BP(mean): --  ABP: --  ABP(mean): --  RR: 16 (06-06-24 @ 05:15) (16 - 16)  SpO2: 96% (06-06-24 @ 05:15) (96% - 98%)  CVP(mm Hg): --  CVP(cm H2O): --    Ins and Outs     06-05-24 @ 07:01  -  06-06-24 @ 07:00  --------------------------------------------------------  IN: 360 mL / OUT: 800 mL / NET: -440 mL    06-06-24 @ 07:01  -  06-06-24 @ 12:55  --------------------------------------------------------  IN: 400 mL / OUT: 700 mL / NET: -300 mL                I&O's Detail    05 Jun 2024 07:01  -  06 Jun 2024 07:00  --------------------------------------------------------  IN:    Oral Fluid: 360 mL  Total IN: 360 mL    OUT:    Incontinent per Condom Catheter (mL): 500 mL    Voided (mL): 300 mL  Total OUT: 800 mL    Total NET: -440 mL      06 Jun 2024 07:01  -  06 Jun 2024 12:55  --------------------------------------------------------  IN:    Oral Fluid: 400 mL  Total IN: 400 mL    OUT:    Incontinent per Condom Catheter (mL): 700 mL  Total OUT: 700 mL    Total NET: -300 mL

## 2024-06-06 NOTE — DISCHARGE NOTE PROVIDER - NSDCHHATTENDCERT_GEN_ALL_CORE
Detail Level: Generalized
Action 3: Continue
Treatment 1: Cosentyx 300 mg injected every 4 weeks
Detail Level: Simple
Continue Regimen: Cosentyx injections
My signature below certifies that the above stated patient is homebound and upon completion of the Face-To-Face encounter, has the need for intermittent skilled nursing, physical therapy and/or speech or occupational therapy services in their home for their current diagnosis as outlined in their initial plan of care. These services will continue to be monitored by myself or another physician.

## 2024-06-06 NOTE — PROGRESS NOTE ADULT - SUBJECTIVE AND OBJECTIVE BOX
DIANA CARVER  [id number]      Chief Complaint: Congestive heart failure/Pneumonia    Interval History: The patient reports breathing is stable.    Tele: ventricular paced 70s BPM      Current meds:   acetaminophen     Tablet .. 650 milliGRAM(s) Oral every 6 hours PRN  aluminum hydroxide/magnesium hydroxide/simethicone Suspension 30 milliLiter(s) Oral every 4 hours PRN  atorvastatin 40 milliGRAM(s) Oral at bedtime  clopidogrel Tablet 75 milliGRAM(s) Oral daily  dextrose 10% Bolus 125 milliLiter(s) IV Bolus once  dextrose 5%. 1000 milliLiter(s) IV Continuous <Continuous>  dextrose 5%. 1000 milliLiter(s) IV Continuous <Continuous>  dextrose 50% Injectable 12.5 Gram(s) IV Push once  dextrose 50% Injectable 25 Gram(s) IV Push once  dextrose Oral Gel 15 Gram(s) Oral once PRN  doxazosin 2 milliGRAM(s) Oral daily  furosemide   Injectable 40 milliGRAM(s) IV Push daily  glucagon  Injectable 1 milliGRAM(s) IntraMuscular once  insulin lispro (ADMELOG) corrective regimen sliding scale   SubCutaneous three times a day before meals  insulin lispro (ADMELOG) corrective regimen sliding scale   SubCutaneous at bedtime  latanoprost 0.005% Ophthalmic Solution 1 Drop(s) Both EYES at bedtime  loratadine 10 milliGRAM(s) Oral daily  magnesium oxide 400 milliGRAM(s) Oral three times a day with meals  melatonin 3 milliGRAM(s) Oral at bedtime PRN  metoprolol succinate ER 50 milliGRAM(s) Oral daily  multivitamin 1 Tablet(s) Oral daily  ondansetron Injectable 4 milliGRAM(s) IV Push every 8 hours PRN  potassium chloride   Powder 40 milliEquivalent(s) Oral every 2 hours  rivaroxaban 15 milliGRAM(s) Oral with dinner      Objective:       Vital Signs:   T(C): 36.6 (06-05-24 @ 05:04), Max: 36.8 (06-04-24 @ 19:25)  HR: 72 (06-05-24 @ 05:04) (69 - 104)  BP: 145/69 (06-05-24 @ 05:04) (136/69 - 156/80)  RR: 16 (06-05-24 @ 05:04) (16 - 18)  SpO2: 99% (06-05-24 @ 05:04) (96% - 99%)    Physical Exam:   General: no distress  HEENT: sclera anicteric  Neck: supple  CVS: JVP ~ 10 cm H20, irregularly irregular, s1, s2  Chest: unlabored respirations, coarse breath sounds  Abdomen: non-distended  Extremities: chronic lower extremity venous skin changes  Neuro: awake, alert & oriented  Psych: normal affect        Labs:   05 Jun 2024 05:55    136    |  101    |  44     ----------------------------<  124    3.1     |  25     |  1.55     Ca    8.9        05 Jun 2024 05:55  Mg     1.9       05 Jun 2024 05:55                            10.2   5.85  )-----------( 154      ( 05 Jun 2024 05:55 )             32.0             TTE Report from [place] (2021):  LVEF 55-60%  Normal RV systolic function  S/p TAVR, normally functioning   Mild MR, Mild TR  No pericardial effusion       TTE (6/3/24):   1. Technically difficult study with poor endocardial visualization.   2. The left ventricle endocardium is not well visualized, appears to   have moderately reduced LV systolic function in limited views, estimated visual LVEF 40%. Abnormal septal motion likely due to paced rhythm. Consider use of IV ultrasonic enhancing agent to better evaluate endocardium and for more accurate assessment of LVEF, if clinically indicated.   3. Mildly increased LV wall thickness.   4. Normal left ventricular internal cavity size.   5. The right ventricle is not well visualized, appears dilated.   6. Mild mitral valve regurgitation.   7. Mild tricuspid regurgitation.   8. There is a bioprosthetic valve in the aortic postion, appears well   seated with peak velocity within normal limits.   9. The pericardium was not well visualized.  10. Subcostal window not well visualized.        ECG (6/1/24): ventricular paced

## 2024-06-06 NOTE — DISCHARGE NOTE PROVIDER - CARE PROVIDER_API CALL
Kathrin Fuentes.  [facility]  [address] [place] [address]  Phone: [phone]  Fax: [phone]  Follow Up Time:

## 2024-06-06 NOTE — DISCHARGE NOTE PROVIDER - NSDCMRMEDTOKEN_GEN_ALL_CORE_FT
atorvastatin 80 mg oral tablet: 0.5 tab(s) orally once a day (at bedtime)  doxazosin 2 mg oral tablet: 1 tab(s) orally once a day  furosemide 20 mg oral tablet: 2 tab(s) orally once a day  latanoprost 0.005% ophthalmic solution: 1 drop(s) in each eye once a day (at bedtime)  metFORMIN 500 mg oral tablet: 1 tab(s) orally once a day  metoprolol succinate 100 mg oral tablet, extended release: 0.5 tab(s) orally once a day  Multiple Vitamins oral tablet: 1 tab(s) orally once a day  Plavix 75 mg oral tablet: 1 tab(s) orally once a day  potassium chloride 10 mEq oral capsule, extended release: 2 cap(s) orally once a day  PreserVision AREDS 2 oral capsule: 1 cap(s) orally once a day   atorvastatin 80 mg oral tablet: 0.5 tab(s) orally once a day (at bedtime)  doxazosin 2 mg oral tablet: 1 tab(s) orally once a day  Entresto 24 mg-26 mg oral tablet: 1 tab(s) orally 2 times a day  latanoprost 0.005% ophthalmic solution: 1 drop(s) in each eye once a day (at bedtime)  metFORMIN 500 mg oral tablet: 1 tab(s) orally once a day  Metoprolol Succinate ER 50 mg oral tablet, extended release: 1 tab(s) orally once a day  Multiple Vitamins oral tablet: 1 tab(s) orally once a day  Plavix 75 mg oral tablet: 1 tab(s) orally once a day  potassium chloride 10 mEq oral capsule, extended release: 2 cap(s) orally once a day  PreserVision AREDS 2 oral capsule: 1 cap(s) orally once a day  Soaanz 40 mg oral tablet: 1 tab(s) orally once a day  Xarelto 15 mg oral tablet: 1 tab(s) orally once a day (before a meal)

## 2024-06-06 NOTE — DISCHARGE NOTE PROVIDER - HOSPITAL COURSE
Hospital Course  89-year-old male with PMHx HTN, CAD with pacemaker, PVD, T2DM, essential tremor, hx prostate cancer, presenting from [facility] for concern of shortness of breath for the past 6-8 weeks. Associated b/l swelling for the past 1.5 months. Pt also states dry cough x2.5 weeks likely 2/2 seasonal allergies. Pt denies acute worsening of SOB and is unsure why the nursing home sent him here today. Cough improves with lozenges.  Denies fever, N/V, diarrhea, chest pain, abdominal pain, headache.     In ED, afebrile. HR 70-80, -151/, RR 18-21, SpO2 97-98% on 2L NC. Labs remarkable for Hg 11.0, MCV 76.1, d-dimer 686, Cr 1.69, GFR 36, BNP 3408. Trop x1 neg. US LE neg for DVT. EKG sinus with complete heart block, ventricular placed, . CXR  with indeterminate right lower lung opacity. Given rocephin x1, furosemide 40mg.    Richard Davila is a 89 year old man with past medical history of Coronary artery disease (s/p CABG & PCIs), HFrEF (LVEF 30%), Atrial fibrillation (on Xarelto), TAVR, Permanent pacemaker, Hypertension, Chronic kidney disease, Prostate cancer (s/p surgery) brought in by EMS from assisted living facility due to progressive dyspnea and leg swelling, found to have signs of congestive heart failure with pneumonia - admitted to [facility] on 6/1/24.    ECG consistent with ventricular paced rhythm. Troponins negative x 2. CT chest consistent with bilateral lung opacities and small loculated and nodular bilateral pleural effusions. Pro BNP elevated, Cr elevated. Leg US negative for DVT.    Patient is followed by Dr. Richard Avitia at [facility], LVEF has been 30% on prior ECHOs, TTE done here c/w LVEF ~ 40%, pt was diuresed with IV lasix, continue metoprolol succinate, [employer] PPM interrogated.  Started on Entresto, transitioned to oral torsemide 40 daily as per cardiology recommendations.  All other chronic conditions deemed to be stable, clinically improved, near euvolemic.  Medically cleared to return to [facility] pending Covid swab and PT consult.     CT chest done during admission showed ill defined patchy and clustered bilateral lung opacities, small bilateral pleural effusions.  Zosyn was initially started for concern over possible pneumonia then stopped.  Dyspnea likely from cardiogenic causes.      Source of Infection:  Antibiotic / Last Day: 1-2 days of IV zosyn    Palliative Care / Advanced Care Planning  Code Status: pt is DNR with trial of Non invasive ventilation only (DNI)  Patient/Family agreeable to Hospice/Palliative (Y/N)?  Summary of Goals of Care Conversation:    Discharging Provider:  Humberto Yeboah NP  Contact Info: Cell [phone] - Please call with any questions or concerns.    Outpatient Provider: Dr. Fuentes           Hospital Course  89-year-old male with PMHx HTN, CAD with pacemaker, PVD, T2DM, essential tremor, hx prostate cancer, presenting from [facility] for concern of shortness of breath for the past 6-8 weeks. Associated b/l swelling for the past 1.5 months. Pt also states dry cough x2.5 weeks likely 2/2 seasonal allergies. Pt denies acute worsening of SOB and is unsure why the nursing home sent him here today. Cough improves with lozenges.  Denies fever, N/V, diarrhea, chest pain, abdominal pain, headache.     In ED, afebrile. HR 70-80, -151/, RR 18-21, SpO2 97-98% on 2L NC. Labs remarkable for Hg 11.0, MCV 76.1, d-dimer 686, Cr 1.69, GFR 36, BNP 3408. Trop x1 neg. US LE neg for DVT. EKG sinus with complete heart block, ventricular placed, . CXR  with indeterminate right lower lung opacity. Given rocephin x1, furosemide 40mg.    Richard Davila is a 89 year old man with past medical history of Coronary artery disease (s/p CABG & PCIs), HFrEF (LVEF 30%), Atrial fibrillation (on Xarelto), TAVR, Permanent pacemaker, Hypertension, Chronic kidney disease, Prostate cancer (s/p surgery) brought in by EMS from assisted living facility due to progressive dyspnea and leg swelling, found to have signs of congestive heart failure with pneumonia - admitted to [facility] on 6/1/24.    ECG consistent with ventricular paced rhythm. Troponins negative x 2. CT chest consistent with bilateral lung opacities and small loculated and nodular bilateral pleural effusions. Pro BNP elevated, Cr elevated. Leg US negative for DVT.    Patient is followed by Dr. Richard Avitia at [facility], LVEF has been 30% on prior ECHOs, TTE done here c/w LVEF ~ 40%, pt was diuresed with IV lasix, continue metoprolol succinate, [employer] PPM interrogated.  Started on Entresto, transitioned to oral torsemide 40 daily as per cardiology recommendations.  All other chronic conditions deemed to be stable, clinically improved, near euvolemic.  Medically cleared to return to [facility] pending Covid swab and PT consult.     CT chest done during admission showed ill defined patchy and clustered bilateral lung opacities, small bilateral pleural effusions.  Zosyn was initially started for concern over possible pneumonia then stopped.  Dyspnea likely from cardiogenic causes.    Palliative Care / Advanced Care Planning  Code Status: pt is DNR with trial of Non invasive ventilation only (DNI)  Patient/Family agreeable to Hospice/Palliative (Y/N)?  Summary of Goals of Care Conversation:    Discharging Provider:  Humberto Yeboah NP  Contact Info: Cell [phone] - Please call with any questions or concerns.    Outpatient Provider: Dr. Fuentes           Hospital Course  89-year-old male with PMHx HTN, CAD with pacemaker, PVD, T2DM, essential tremor, hx prostate cancer, presenting from [facility] for concern of shortness of breath for the past 6-8 weeks. Associated b/l swelling for the past 1.5 months. Pt also states dry cough x2.5 weeks likely 2/2 seasonal allergies. Pt denies acute worsening of SOB and is unsure why the nursing home sent him here today. Cough improves with lozenges.  Denies fever, N/V, diarrhea, chest pain, abdominal pain, headache.     In ED, afebrile. HR 70-80, -151/, RR 18-21, SpO2 97-98% on 2L NC. Labs remarkable for Hg 11.0, MCV 76.1, d-dimer 686, Cr 1.69, GFR 36, BNP 3408. Trop x1 neg. US LE neg for DVT. EKG sinus with complete heart block, ventricular placed, . CXR  with indeterminate right lower lung opacity. Given rocephin x1, furosemide 40mg.    Richard Davila is a 89 year old man with past medical history of Coronary artery disease (s/p CABG & PCIs), HFrEF (LVEF 30%), Atrial fibrillation (on Xarelto), TAVR, Permanent pacemaker, Hypertension, Chronic kidney disease, Prostate cancer (s/p surgery) brought in by EMS from assisted living facility due to progressive dyspnea and leg swelling, found to have signs of congestive heart failure with pneumonia - admitted to [facility] on 6/1/24.    ECG consistent with ventricular paced rhythm. Troponins negative x 2. CT chest consistent with bilateral lung opacities and small loculated and nodular bilateral pleural effusions. Pro BNP elevated, Cr elevated. Leg US negative for DVT.    Patient is followed by Dr. Richard Avitia at [facility], LVEF has been 30% on prior ECHOs, TTE done here c/w LVEF ~ 40%, pt was diuresed with IV lasix, continue metoprolol succinate, [employer] PPM interrogated.  Started on Entresto, transitioned to oral torsemide 40 daily as per cardiology recommendations.  All other chronic conditions deemed to be stable, clinically improved, near euvolemic.  Medically cleared to return to [facility] pending Covid swab and PT consult.     CT chest done during admission showed ill defined patchy and clustered bilateral lung opacities, small bilateral pleural effusions.  Zosyn was initially started for concern over possible pneumonia then stopped.  Dyspnea likely from cardiogenic causes.      Discharging Provider:  Dr. Che Wallace  Contact Info: Cell [phone] - Please call with any questions or concerns.    Outpatient Provider: Dr. Fuentes

## 2024-06-06 NOTE — PROGRESS NOTE ADULT - SUBJECTIVE AND OBJECTIVE BOX
No distress    Vital Signs Last 24 Hrs  T(C): 36.5 (06-06-24 @ 19:16), Max: 36.6 (06-06-24 @ 05:15)  T(F): 97.7 (06-06-24 @ 19:16), Max: 97.8 (06-06-24 @ 05:15)  HR: 69 (06-06-24 @ 19:16) (69 - 76)  BP: 135/74 (06-06-24 @ 19:16) (135/74 - 152/86)  RR: 16 (06-06-24 @ 19:16) (16 - 16)  SpO2: 97% (06-06-24 @ 19:16) (95% - 97%)    I&O's Detail    05 Jun 2024 07:01  -  06 Jun 2024 07:00  --------------------------------------------------------  IN:    Oral Fluid: 360 mL  Total IN: 360 mL    OUT:    Incontinent per Condom Catheter (mL): 500 mL    Voided (mL): 300 mL  Total OUT: 800 mL    06 Jun 2024 07:01  -  06 Jun 2024 23:36  --------------------------------------------------------  IN:    Oral Fluid: 400 mL  Total IN: 400 mL    OUT:    Incontinent per Condom Catheter (mL): 700 mL  Total OUT: 700 mL    s1s2  b/l air entry  soft, ND  + edema, stasis changes                                            10.2   5.85  )-----------( 154      ( 05 Jun 2024 05:55 )             32.0     06 Jun 2024 07:05    139    |  104    |  45     ----------------------------<  146    3.8     |  25     |  1.58     Ca    8.9        06 Jun 2024 07:05  Mg     1.9       06 Jun 2024 07:05    acetaminophen     Tablet .. 650 milliGRAM(s) Oral every 6 hours PRN  aluminum hydroxide/magnesium hydroxide/simethicone Suspension 30 milliLiter(s) Oral every 4 hours PRN  atorvastatin 40 milliGRAM(s) Oral at bedtime  clopidogrel Tablet 75 milliGRAM(s) Oral daily  dextrose 10% Bolus 125 milliLiter(s) IV Bolus once  dextrose 5%. 1000 milliLiter(s) IV Continuous <Continuous>  dextrose 5%. 1000 milliLiter(s) IV Continuous <Continuous>  dextrose 50% Injectable 12.5 Gram(s) IV Push once  dextrose 50% Injectable 25 Gram(s) IV Push once  dextrose Oral Gel 15 Gram(s) Oral once PRN  doxazosin 2 milliGRAM(s) Oral daily  glucagon  Injectable 1 milliGRAM(s) IntraMuscular once  insulin lispro (ADMELOG) corrective regimen sliding scale   SubCutaneous three times a day before meals  insulin lispro (ADMELOG) corrective regimen sliding scale   SubCutaneous at bedtime  latanoprost 0.005% Ophthalmic Solution 1 Drop(s) Both EYES at bedtime  loratadine 10 milliGRAM(s) Oral daily  magnesium oxide 400 milliGRAM(s) Oral three times a day with meals  melatonin 3 milliGRAM(s) Oral at bedtime PRN  metoprolol succinate ER 50 milliGRAM(s) Oral daily  multivitamin 1 Tablet(s) Oral daily  ondansetron Injectable 4 milliGRAM(s) IV Push every 8 hours PRN  rivaroxaban 15 milliGRAM(s) Oral with dinner  sacubitril 24 mG/valsartan 26 mG 1 Tablet(s) Oral two times a day  torsemide 40 milliGRAM(s) Oral daily    A/P:    CM, EF ~ 40%  Adm w/edema, SOB  Baseline Cr is unknown  Overall stable renal indices  F/u BMP on Entresto, Torsemide   SONO w/o hydro  UA - bland  Avoid nephrotoxins     [phone]

## 2024-06-06 NOTE — PROGRESS NOTE ADULT - SUBJECTIVE AND OBJECTIVE BOX
Patient is a 89y old  Male who presents with a chief complaint of SOB (05 Jun 2024 22:43)    Patient seen and examined at bedside.  pt does not report any issues or complaints, reports feeling ok this am    ALLERGIES:  No Known Allergies        Vital Signs Last 24 Hrs  T(F): 97.8 (06 Jun 2024 05:15), Max: 97.8 (06 Jun 2024 05:15)  HR: 72 (06 Jun 2024 05:15) (70 - 72)  BP: 152/86 (06 Jun 2024 05:15) (125/83 - 152/86)  RR: 16 (06 Jun 2024 05:15) (16 - 16)  SpO2: 96% (06 Jun 2024 05:15) (96% - 99%)  I&O's Summary    05 Jun 2024 07:01  -  06 Jun 2024 07:00  --------------------------------------------------------  IN: 360 mL / OUT: 800 mL / NET: -440 mL      MEDICATIONS:  acetaminophen     Tablet .. 650 milliGRAM(s) Oral every 6 hours PRN  aluminum hydroxide/magnesium hydroxide/simethicone Suspension 30 milliLiter(s) Oral every 4 hours PRN  atorvastatin 40 milliGRAM(s) Oral at bedtime  clopidogrel Tablet 75 milliGRAM(s) Oral daily  dextrose 10% Bolus 125 milliLiter(s) IV Bolus once  dextrose 5%. 1000 milliLiter(s) IV Continuous <Continuous>  dextrose 5%. 1000 milliLiter(s) IV Continuous <Continuous>  dextrose 50% Injectable 25 Gram(s) IV Push once  dextrose 50% Injectable 12.5 Gram(s) IV Push once  dextrose Oral Gel 15 Gram(s) Oral once PRN  doxazosin 2 milliGRAM(s) Oral daily  glucagon  Injectable 1 milliGRAM(s) IntraMuscular once  insulin lispro (ADMELOG) corrective regimen sliding scale   SubCutaneous three times a day before meals  insulin lispro (ADMELOG) corrective regimen sliding scale   SubCutaneous at bedtime  latanoprost 0.005% Ophthalmic Solution 1 Drop(s) Both EYES at bedtime  loratadine 10 milliGRAM(s) Oral daily  magnesium oxide 400 milliGRAM(s) Oral three times a day with meals  melatonin 3 milliGRAM(s) Oral at bedtime PRN  metoprolol succinate ER 50 milliGRAM(s) Oral daily  multivitamin 1 Tablet(s) Oral daily  ondansetron Injectable 4 milliGRAM(s) IV Push every 8 hours PRN  rivaroxaban 15 milliGRAM(s) Oral with dinner  sacubitril 24 mG/valsartan 26 mG 1 Tablet(s) Oral two times a day  torsemide 40 milliGRAM(s) Oral daily      PHYSICAL EXAM:  General: NAD, A/O x 3  ENT: MMM, no thrush  Neck: Supple, No JVD  Lungs: Clear to auscultation bilaterally, non labored, bilateral air entry  Cardio: S1S2 irregular  Abdomen: Soft, Nontender, Nondistended; Bowel sounds present  Extremities: No cyanosis, Chronic LE skin changes    LABS:                        10.2   5.85  )-----------( 154      ( 05 Jun 2024 05:55 )             32.0     06-05    136  |  101  |  44  ----------------------------<  124  3.1   |  25  |  1.55    Ca    8.9      05 Jun 2024 05:55  Mg     1.9     06-06                              POCT Blood Glucose.: 172 mg/dL (06 Jun 2024 08:16)  POCT Blood Glucose.: 176 mg/dL (05 Jun 2024 22:03)  POCT Blood Glucose.: 211 mg/dL (05 Jun 2024 16:52)  POCT Blood Glucose.: 233 mg/dL (05 Jun 2024 11:34)      Urinalysis Basic - ( 05 Jun 2024 05:55 )    Color: x / Appearance: x / SG: x / pH: x  Gluc: 124 mg/dL / Ketone: x  / Bili: x / Urobili: x   Blood: x / Protein: x / Nitrite: x   Leuk Esterase: x / RBC: x / WBC x   Sq Epi: x / Non Sq Epi: x / Bacteria: x        Culture - Blood (collected 03 Jun 2024 13:03)  Source: .Blood Blood-Peripheral  Preliminary Report (05 Jun 2024 20:01):    No growth at 48 Hours    Culture - Blood (collected 03 Jun 2024 13:03)  Source: .Blood Blood-Venous  Preliminary Report (05 Jun 2024 20:01):    No growth at 48 Hours    Culture - Blood (collected 01 Jun 2024 18:50)  Source: .Blood Blood-Peripheral  Gram Stain (04 Jun 2024 03:44):    Growth in anaerobic bottle: Gram Positive Cocci in Clusters  Final Report (05 Jun 2024 10:57):    Growth in anaerobic bottle: Staphylococcus capitis    Isolation of Coagulase negative Staphylococcus from single blood culture    sets may represent    contamination. Contact the Microbiology Department at [phone] if    susceptibility testing is    clinically indicated.    Culture - Blood (collected 01 Jun 2024 18:47)  Source: .Blood Blood-Peripheral  Gram Stain (02 Jun 2024 19:11):    Growth in aerobic bottle: Gram positive cocci in pairs  Final Report (03 Jun 2024 17:35):    Growth in aerobic bottle: Staphylococcus haemolyticus    Isolation of Coagulase negative Staphylococcus from single blood culture    sets may represent    contamination. Contact the Microbiology Department at [phone] if    susceptibility testing is    clinically indicated.    Direct identification is available within approximately 3-5    hours either by Blood Panel Multiplexed PCR or Direct    MALDI-TOF. Details: [email or web address]  Organism: Blood Culture PCR (03 Jun 2024 17:35)  Organism: Blood Culture PCR (03 Jun 2024 17:35)      Method Type: PCR      -  Coagulase negative Staphylococcus: Detec          RADIOLOGY & ADDITIONAL TESTS:    Care Discussed with Consultants/Other Providers:    Patient is a 89y old  Male who presents with a chief complaint of SOB (05 Jun 2024 22:43)    Patient seen and examined at bedside.  pt does not report any issues or complaints, reports feeling ok this am    ALLERGIES:  No Known Allergies        Vital Signs Last 24 Hrs  T(F): 97.8 (06 Jun 2024 05:15), Max: 97.8 (06 Jun 2024 05:15)  HR: 72 (06 Jun 2024 05:15) (70 - 72)  BP: 152/86 (06 Jun 2024 05:15) (125/83 - 152/86)  RR: 16 (06 Jun 2024 05:15) (16 - 16)  SpO2: 96% (06 Jun 2024 05:15) (96% - 99%)  I&O's Summary    05 Jun 2024 07:01  -  06 Jun 2024 07:00  --------------------------------------------------------  IN: 360 mL / OUT: 800 mL / NET: -440 mL      MEDICATIONS:  acetaminophen     Tablet .. 650 milliGRAM(s) Oral every 6 hours PRN  aluminum hydroxide/magnesium hydroxide/simethicone Suspension 30 milliLiter(s) Oral every 4 hours PRN  atorvastatin 40 milliGRAM(s) Oral at bedtime  clopidogrel Tablet 75 milliGRAM(s) Oral daily  dextrose 10% Bolus 125 milliLiter(s) IV Bolus once  dextrose 5%. 1000 milliLiter(s) IV Continuous <Continuous>  dextrose 5%. 1000 milliLiter(s) IV Continuous <Continuous>  dextrose 50% Injectable 25 Gram(s) IV Push once  dextrose 50% Injectable 12.5 Gram(s) IV Push once  dextrose Oral Gel 15 Gram(s) Oral once PRN  doxazosin 2 milliGRAM(s) Oral daily  glucagon  Injectable 1 milliGRAM(s) IntraMuscular once  insulin lispro (ADMELOG) corrective regimen sliding scale   SubCutaneous three times a day before meals  insulin lispro (ADMELOG) corrective regimen sliding scale   SubCutaneous at bedtime  latanoprost 0.005% Ophthalmic Solution 1 Drop(s) Both EYES at bedtime  loratadine 10 milliGRAM(s) Oral daily  magnesium oxide 400 milliGRAM(s) Oral three times a day with meals  melatonin 3 milliGRAM(s) Oral at bedtime PRN  metoprolol succinate ER 50 milliGRAM(s) Oral daily  multivitamin 1 Tablet(s) Oral daily  ondansetron Injectable 4 milliGRAM(s) IV Push every 8 hours PRN  rivaroxaban 15 milliGRAM(s) Oral with dinner  sacubitril 24 mG/valsartan 26 mG 1 Tablet(s) Oral two times a day  torsemide 40 milliGRAM(s) Oral daily      PHYSICAL EXAM:  General: NAD, A/O x 3  ENT: MMM, no thrush  Neck: Supple, No JVD  Lungs: Clear to auscultation bilaterally, non labored, bilateral air entry  Cardio: S1S2 irregular  Abdomen: Soft, Nontender, Nondistended; Bowel sounds present  Extremities: No cyanosis, Chronic LE skin changes    LABS:                        10.2   5.85  )-----------( 154      ( 05 Jun 2024 05:55 )             32.0     06-05    136  |  101  |  44  ----------------------------<  124  3.1   |  25  |  1.55    Ca    8.9      05 Jun 2024 05:55  Mg     1.9     06-06                              POCT Blood Glucose.: 172 mg/dL (06 Jun 2024 08:16)  POCT Blood Glucose.: 176 mg/dL (05 Jun 2024 22:03)  POCT Blood Glucose.: 211 mg/dL (05 Jun 2024 16:52)  POCT Blood Glucose.: 233 mg/dL (05 Jun 2024 11:34)      Urinalysis Basic - ( 05 Jun 2024 05:55 )    Color: x / Appearance: x / SG: x / pH: x  Gluc: 124 mg/dL / Ketone: x  / Bili: x / Urobili: x   Blood: x / Protein: x / Nitrite: x   Leuk Esterase: x / RBC: x / WBC x   Sq Epi: x / Non Sq Epi: x / Bacteria: x        Culture - Blood (collected 03 Jun 2024 13:03)  Source: .Blood Blood-Peripheral  Preliminary Report (05 Jun 2024 20:01):    No growth at 48 Hours    Culture - Blood (collected 03 Jun 2024 13:03)  Source: .Blood Blood-Venous  Preliminary Report (05 Jun 2024 20:01):    No growth at 48 Hours    Culture - Blood (collected 01 Jun 2024 18:50)  Source: .Blood Blood-Peripheral  Gram Stain (04 Jun 2024 03:44):    Growth in anaerobic bottle: Gram Positive Cocci in Clusters  Final Report (05 Jun 2024 10:57):    Growth in anaerobic bottle: Staphylococcus capitis    Isolation of Coagulase negative Staphylococcus from single blood culture    sets may represent    contamination. Contact the Microbiology Department at [phone] if    susceptibility testing is    clinically indicated.    Culture - Blood (collected 01 Jun 2024 18:47)  Source: .Blood Blood-Peripheral  Gram Stain (02 Jun 2024 19:11):    Growth in aerobic bottle: Gram positive cocci in pairs  Final Report (03 Jun 2024 17:35):    Growth in aerobic bottle: Staphylococcus haemolyticus    Isolation of Coagulase negative Staphylococcus from single blood culture    sets may represent    contamination. Contact the Microbiology Department at [phone] if    susceptibility testing is    clinically indicated.    Direct identification is available within approximately 3-5    hours either by Blood Panel Multiplexed PCR or Direct    MALDI-TOF. Details: [email or web address]  Organism: Blood Culture PCR (03 Jun 2024 17:35)  Organism: Blood Culture PCR (03 Jun 2024 17:35)      Method Type: PCR      -  Coagulase negative Staphylococcus: Detec          RADIOLOGY & ADDITIONAL TESTS:    Care Discussed with Consultants/Other Providers:   yes with pulm and cardio

## 2024-06-06 NOTE — PROGRESS NOTE ADULT - ASSESSMENT
Richard Davila is a 89 year old man with past medical history of Coronary artery disease (s/p CABG & PCIs), HFrEF (LVEF 30%), Atrial fibrillation (on Xarelto), TAVR, Permanent pacemaker, Hypertension, Chronic kidney disease, Prostate cancer (s/p surgery) brought in by EMS from assisted living facility due to progressive dyspnea and leg swelling, found to have signs of congestive heart failure.    Acute on Chronic Systolic CHF  Transient Asymptomatic V Tach on Tele   pt presented with worsening SOB, LE edema, BNP 3408, Tn negative  EKG sinus with CHB, ventricular paced, , pt has PPM  Cardiology following, pt is followed by Dr. Richard Avitia at [facility], previous ECHO showed EF ~ 30%  ECHO done during this admission shows EF ~ 40%, mild MR/TR, bioprosthetic valve in aortic position  cardiology to assist with GDMT baed on ECHO results and patient's ability to tolerate   Follow up [employer] PPM interrogation, monitor renal function, continue metoprolol succinate  Pt appears near euvolemic, transition to Torsemide 40 daily and started entresto this am  Monitor daily weights, Strict I/Os, Dopplers negative  Covid swab within 48 of returning to [facility], Anticipate dc back to [facility] ~ 24 hours (pt needs to be ambulatory, follow up PT re evaluation)    Urinary Retention in pt with AUS  urology evaluation appreciated  do not attempt hansen placement in pt with artificial urinary sphincter  AUS has been re activated by Urology, monitor to ensure pt is properly voiding    Abnormal CT Chest  Hx of Lung Cancer  Chronic Cough, dry  CT chest revealed ill defined patchy and clustered bilateral lung opacities  left lower lobe subpleural 4 mm nodule vs intrapulmonary lymph node, RT lung post surgical changes  small loculated and nodular bilateral pleural effusions  pulmonology evaluation appreciated  stopped zosyn, low suspicion for acute pneumonia, follow up repeat blood cultures (BC grew coag neg staph in one bottle, presumed to be contaminant)  Strep pneumo Ag and Urine legionella negative, RVP negative  Continue nebulizers for now, dyspnea likely r/t CHF  Stable respiratory status off oxygen requirements, continue claritin for possible seasonal allergies    ALVIN/?CKD3  baseline Cr unknown  renal evaluation appreciated, overall stable renal indices  SONO without hydro, no objection to diuresis  avoid nephrotoxins, follow BMP  UA reviewed    T2DM  metformin being held  continue moderate ISS, FS    CAD s/p CABG and PCI  Atrial Fibrillation, persistent  cardiology following, ECHO as above  continue xarelto for stroke ppx  no evidence of ACS, continue plavix and high intensity statin therapies  rate control with metoprolol, monitor on telemetry    Prophylactic Measure  heparin    plan discussed with family at bedside, all questions answered

## 2024-06-06 NOTE — PROGRESS NOTE ADULT - NS ATTEND AMEND GEN_ALL_CORE FT
reviewed AM labs, stable. d/c Lasix and started Torsemide/Entresto per cardio recs. Spoke with pulm Dr. Escamilla regarding need for f/u CT as out patient, [facility]. plan for d/c tomorrow, ordered COVID swab and spoke with PT for reeval. f/u am labs

## 2024-06-06 NOTE — PROGRESS NOTE ADULT - ASSESSMENT
Assessment: 89 year old man with past medical history of Coronary artery disease (s/p CABG & PCIs), HFrEF (LVEF 30%), Atrial fibrillation (on Xarelto), TAVR, Permanent pacemaker, Hypertension, Chronic kidney disease, Prostate cancer (s/p surgery) brought in by EMS from assisted living facility due to progressive dyspnea and leg swelling, found to have signs of congestive heart failure with pneumonia.    ECG consistent with ventricular paced rhythm. Troponins negative x 2. CT chest consistent with bilateral lung opacities and small loculated and nodular bilateral pleural effusions. Pro BNP elevated, Cr elevated. Leg US negative for DVT.    Recommendations:  - TTE here consistent with LVEF 40%, pt presented TTE from outpt with EF 30%  - transitioned to Torsemide 40 mg PO daily tomorrow. If renal function improves, would benefit from Entresto  - Continue Metoprolol succinate. Recommend [employer] PPM interrogation (tele tech made aware, awaiting results).    - Continue Xarelto for stroke prophylaxis               Assessment: 89 year old man with past medical history of Coronary artery disease (s/p CABG & PCIs), HFrEF (LVEF 30%), Atrial fibrillation (on Xarelto), TAVR, Permanent pacemaker, Hypertension, Chronic kidney disease, Prostate cancer (s/p surgery) brought in by EMS from assisted living facility due to progressive dyspnea and leg swelling, found to have signs of congestive heart failure with pneumonia.    ECG consistent with ventricular paced rhythm. Troponins negative x 2. CT chest consistent with bilateral lung opacities and small loculated and nodular bilateral pleural effusions. Pro BNP elevated, Cr elevated. Leg US negative for DVT.    Recommendations:  - TTE here consistent with LVEF 40%, pt presented TTE from outpt with EF 30%  - transitioned to Torsemide 40 mg PO daily. If renal function improves, would benefit from Entresto  - Continue Metoprolol succinate. Recommend [employer] PPM interrogation (, awaiting results).    - Continue Xarelto for stroke prophylaxis

## 2024-06-06 NOTE — PROGRESS NOTE ADULT - TIME BILLING
Time spent includes direct patient care  (interview and examination of patient), discussion with other providers, support staff and/or patient's family members, review of medical records, ordering diagnostic tests and analyzing results, and documentation.
Time spent includes direct patient care  (interview and examination of patient), discussion with other providers, support staff and/or patient's family members, review of medical records, ordering diagnostic tests and analyzing results, and documentation, excluding time spent in ACP discussions.

## 2024-06-06 NOTE — PROGRESS NOTE ADULT - ASSESSMENT
Physical Examination:  GENERAL:               Alert, Oriented, No acute distress.    HEENT:                    No JVD, Dry MM  PULM:                     Bilateral air entry, Clear to auscultation bilaterally, no significant sputum production, bibasilarRales, No Rhonchi, No Wheezing  CVS:                         S1, S2,  + Murmur  ABD:                        Soft, nondistended, nontender, normoactive bowel sounds,   EXT:                         Chronic LE venous stasis and edema, nontender, No Cyanosis or Clubbing   NEURO:                  Alert, oriented, interactive, nonfocal, follows commands  PSYC:                      Calm, + Insight.      Assessment  89 year old male with history of lung cancer (unsure weather right or left) s/p ? resection denies chemo and unsure of radiation, non smoker, denies h/o COPD/Asthma, HTN, CAD with pacemaker s/p cabg, PVD, T2DM, essential tremor, hx prostate cancer, presenting from [facility] for concern of shortness of breath/GRANADOS      Problem List  1. Dyspnea suspect cardiogenic causes,   2. Chronic cough, possible from loculated pl effusions vs seasonal allergies  3. Abnormal CT chest with -       Ill-defined patchy and clustered bilateral lung opacities.       Left lower lobe subpleural 4 mm nodule versus intrapulmonary lymph node.       Right lung postsurgical changes.       Small loculated and nodular bilateral pleural effusions      Ill-defined patchy and clustered bilateral lung opacities.     Plan  Finished course empiric antibiotics ill defined patchy infiltrates  Loculated effusion on right could be post surgical and chronic, requested family to obtain old imaging  left lower lobe subpleural 4 mm nodule can consider repeat CT chest in 6-12 months if desire for cancer workup desired  Continue Claritin and Tessalon   nebs prn  pt comfortable on room air  dispo planning can be considered   d/w family- Dtr Diana  regarding need for f/u CT as out patient, [facility]

## 2024-06-07 ENCOUNTER — TRANSCRIPTION ENCOUNTER (OUTPATIENT)
Age: 89
End: 2024-06-07

## 2024-06-07 VITALS
TEMPERATURE: 98 F | SYSTOLIC BLOOD PRESSURE: 127 MMHG | OXYGEN SATURATION: 98 % | DIASTOLIC BLOOD PRESSURE: 68 MMHG | RESPIRATION RATE: 18 BRPM | HEART RATE: 72 BPM

## 2024-06-07 LAB
ANION GAP SERPL CALC-SCNC: 7 MMOL/L — SIGNIFICANT CHANGE UP (ref 5–17)
BUN SERPL-MCNC: 51 MG/DL — HIGH (ref 7–23)
CALCIUM SERPL-MCNC: 8.5 MG/DL — SIGNIFICANT CHANGE UP (ref 8.4–10.5)
CHLORIDE SERPL-SCNC: 105 MMOL/L — SIGNIFICANT CHANGE UP (ref 96–108)
CO2 SERPL-SCNC: 26 MMOL/L — SIGNIFICANT CHANGE UP (ref 22–31)
CREAT SERPL-MCNC: 1.54 MG/DL — HIGH (ref 0.5–1.3)
EGFR: 41 ML/MIN/1.73M2 — LOW
GLUCOSE BLDC GLUCOMTR-MCNC: 139 MG/DL — HIGH (ref 70–99)
GLUCOSE SERPL-MCNC: 130 MG/DL — HIGH (ref 70–99)
MAGNESIUM SERPL-MCNC: 2.1 MG/DL — SIGNIFICANT CHANGE UP (ref 1.6–2.6)
PHOSPHATE SERPL-MCNC: 2.9 MG/DL — SIGNIFICANT CHANGE UP (ref 2.5–4.5)
POTASSIUM SERPL-MCNC: 3.7 MMOL/L — SIGNIFICANT CHANGE UP (ref 3.5–5.3)
POTASSIUM SERPL-SCNC: 3.7 MMOL/L — SIGNIFICANT CHANGE UP (ref 3.5–5.3)
SODIUM SERPL-SCNC: 138 MMOL/L — SIGNIFICANT CHANGE UP (ref 135–145)

## 2024-06-07 PROCEDURE — 85610 PROTHROMBIN TIME: CPT

## 2024-06-07 PROCEDURE — 85730 THROMBOPLASTIN TIME PARTIAL: CPT

## 2024-06-07 PROCEDURE — 85379 FIBRIN DEGRADATION QUANT: CPT

## 2024-06-07 PROCEDURE — 84100 ASSAY OF PHOSPHORUS: CPT

## 2024-06-07 PROCEDURE — 71250 CT THORAX DX C-: CPT | Mod: MC

## 2024-06-07 PROCEDURE — 80048 BASIC METABOLIC PNL TOTAL CA: CPT

## 2024-06-07 PROCEDURE — 83735 ASSAY OF MAGNESIUM: CPT

## 2024-06-07 PROCEDURE — 83690 ASSAY OF LIPASE: CPT

## 2024-06-07 PROCEDURE — 84484 ASSAY OF TROPONIN QUANT: CPT

## 2024-06-07 PROCEDURE — 82962 GLUCOSE BLOOD TEST: CPT

## 2024-06-07 PROCEDURE — 94640 AIRWAY INHALATION TREATMENT: CPT

## 2024-06-07 PROCEDURE — 97535 SELF CARE MNGMENT TRAINING: CPT

## 2024-06-07 PROCEDURE — 99239 HOSP IP/OBS DSCHRG MGMT >30: CPT

## 2024-06-07 PROCEDURE — 87150 DNA/RNA AMPLIFIED PROBE: CPT

## 2024-06-07 PROCEDURE — 84145 PROCALCITONIN (PCT): CPT

## 2024-06-07 PROCEDURE — 99232 SBSQ HOSP IP/OBS MODERATE 35: CPT

## 2024-06-07 PROCEDURE — 87635 SARS-COV-2 COVID-19 AMP PRB: CPT

## 2024-06-07 PROCEDURE — 84443 ASSAY THYROID STIM HORMONE: CPT

## 2024-06-07 PROCEDURE — 80053 COMPREHEN METABOLIC PANEL: CPT

## 2024-06-07 PROCEDURE — 87449 NOS EACH ORGANISM AG IA: CPT

## 2024-06-07 PROCEDURE — 0225U NFCT DS DNA&RNA 21 SARSCOV2: CPT

## 2024-06-07 PROCEDURE — 85025 COMPLETE CBC W/AUTO DIFF WBC: CPT

## 2024-06-07 PROCEDURE — 87899 AGENT NOS ASSAY W/OPTIC: CPT

## 2024-06-07 PROCEDURE — 93005 ELECTROCARDIOGRAM TRACING: CPT

## 2024-06-07 PROCEDURE — 83036 HEMOGLOBIN GLYCOSYLATED A1C: CPT

## 2024-06-07 PROCEDURE — 97110 THERAPEUTIC EXERCISES: CPT

## 2024-06-07 PROCEDURE — 81001 URINALYSIS AUTO W/SCOPE: CPT

## 2024-06-07 PROCEDURE — 93970 EXTREMITY STUDY: CPT

## 2024-06-07 PROCEDURE — 76775 US EXAM ABDO BACK WALL LIM: CPT

## 2024-06-07 PROCEDURE — 83880 ASSAY OF NATRIURETIC PEPTIDE: CPT

## 2024-06-07 PROCEDURE — 36415 COLL VENOUS BLD VENIPUNCTURE: CPT

## 2024-06-07 PROCEDURE — 96374 THER/PROPH/DIAG INJ IV PUSH: CPT

## 2024-06-07 PROCEDURE — 85027 COMPLETE CBC AUTOMATED: CPT

## 2024-06-07 PROCEDURE — 93306 TTE W/DOPPLER COMPLETE: CPT

## 2024-06-07 PROCEDURE — 97166 OT EVAL MOD COMPLEX 45 MIN: CPT

## 2024-06-07 PROCEDURE — 99285 EMERGENCY DEPT VISIT HI MDM: CPT | Mod: 25

## 2024-06-07 PROCEDURE — 71045 X-RAY EXAM CHEST 1 VIEW: CPT

## 2024-06-07 PROCEDURE — 87040 BLOOD CULTURE FOR BACTERIA: CPT

## 2024-06-07 PROCEDURE — 87077 CULTURE AEROBIC IDENTIFY: CPT

## 2024-06-07 RX ORDER — SACUBITRIL AND VALSARTAN 24; 26 MG/1; MG/1
1 TABLET, FILM COATED ORAL
Qty: 60 | Refills: 0
Start: 2024-06-07 | End: 2024-07-06

## 2024-06-07 RX ORDER — ROBINUL 0.2 MG/ML
0.4 INJECTION INTRAMUSCULAR; INTRAVENOUS EVERY 6 HOURS
Refills: 0 | Status: DISCONTINUED | OUTPATIENT
Start: 2024-06-07 | End: 2024-06-07

## 2024-06-07 RX ADMIN — MAGNESIUM OXIDE 400 MG ORAL TABLET 400 MILLIGRAM(S): 241.3 TABLET ORAL at 08:28

## 2024-06-07 RX ADMIN — Medication 40 MILLIGRAM(S): at 06:04

## 2024-06-07 RX ADMIN — Medication 50 MILLIGRAM(S): at 06:04

## 2024-06-07 RX ADMIN — SACUBITRIL AND VALSARTAN 1 TABLET(S): 24; 26 TABLET, FILM COATED ORAL at 06:04

## 2024-06-07 RX ADMIN — Medication 2 MILLIGRAM(S): at 06:08

## 2024-06-07 NOTE — PROGRESS NOTE ADULT - SUBJECTIVE AND OBJECTIVE BOX
Patient is a 89y old  Male who presents with a chief complaint of SOB (06 Jun 2024 23:36)      Patient seen and examined at bedside. No overnight events reported.     ALLERGIES:  No Known Allergies    MEDICATIONS  (STANDING):  atorvastatin 40 milliGRAM(s) Oral at bedtime  clopidogrel Tablet 75 milliGRAM(s) Oral daily  dextrose 10% Bolus 125 milliLiter(s) IV Bolus once  dextrose 5%. 1000 milliLiter(s) (100 mL/Hr) IV Continuous <Continuous>  dextrose 5%. 1000 milliLiter(s) (50 mL/Hr) IV Continuous <Continuous>  dextrose 50% Injectable 12.5 Gram(s) IV Push once  dextrose 50% Injectable 25 Gram(s) IV Push once  doxazosin 2 milliGRAM(s) Oral daily  glucagon  Injectable 1 milliGRAM(s) IntraMuscular once  insulin lispro (ADMELOG) corrective regimen sliding scale   SubCutaneous three times a day before meals  insulin lispro (ADMELOG) corrective regimen sliding scale   SubCutaneous at bedtime  latanoprost 0.005% Ophthalmic Solution 1 Drop(s) Both EYES at bedtime  loratadine 10 milliGRAM(s) Oral daily  magnesium oxide 400 milliGRAM(s) Oral three times a day with meals  metoprolol succinate ER 50 milliGRAM(s) Oral daily  multivitamin 1 Tablet(s) Oral daily  rivaroxaban 15 milliGRAM(s) Oral with dinner  sacubitril 24 mG/valsartan 26 mG 1 Tablet(s) Oral two times a day  torsemide 40 milliGRAM(s) Oral daily    MEDICATIONS  (PRN):  acetaminophen     Tablet .. 650 milliGRAM(s) Oral every 6 hours PRN Temp greater or equal to 38C (100.4F), Mild Pain (1 - 3)  aluminum hydroxide/magnesium hydroxide/simethicone Suspension 30 milliLiter(s) Oral every 4 hours PRN Dyspepsia  dextrose Oral Gel 15 Gram(s) Oral once PRN Blood Glucose LESS THAN 70 milliGRAM(s)/deciliter  melatonin 3 milliGRAM(s) Oral at bedtime PRN Insomnia  ondansetron Injectable 4 milliGRAM(s) IV Push every 8 hours PRN Nausea and/or Vomiting    Vital Signs Last 24 Hrs  T(F): 98.2 (07 Jun 2024 06:01), Max: 98.2 (07 Jun 2024 06:01)  HR: 72 (07 Jun 2024 06:01) (69 - 76)  BP: 127/68 (07 Jun 2024 06:01) (127/68 - 148/87)  RR: 18 (07 Jun 2024 06:01) (16 - 18)  SpO2: 98% (07 Jun 2024 06:01) (95% - 98%)  I&O's Summary    06 Jun 2024 07:01  -  07 Jun 2024 07:00  --------------------------------------------------------  IN: 400 mL / OUT: 700 mL / NET: -300 mL      PHYSICAL EXAM:  General: NAD, A/O x 3  ENT: No gross hearing impairment, Moist mucous membranes, no thrush  Neck: Supple, No JVD  Lungs: Clear to auscultation bilaterally, good air entry, non-labored breathing  Cardio: RRR, S1/S2, No murmur  Abdomen: Soft, Nontender, Nondistended; Bowel sounds present  Extremities: No calf tenderness, No cyanosis, No pitting edema  Psych: Appropriate mood and affect    LABS:                        10.2   5.85  )-----------( 154      ( 05 Jun 2024 05:55 )             32.0     06-07    138  |  105  |  51  ----------------------------<  130  3.7   |  26  |  1.54    Ca    8.5      07 Jun 2024 05:33  Phos  2.9     06-07  Mg     2.1     06-07                                    POCT Blood Glucose.: 177 mg/dL (06 Jun 2024 21:55)  POCT Blood Glucose.: 178 mg/dL (06 Jun 2024 16:58)  POCT Blood Glucose.: 183 mg/dL (06 Jun 2024 11:37)  POCT Blood Glucose.: 172 mg/dL (06 Jun 2024 08:16)      Urinalysis Basic - ( 07 Jun 2024 05:33 )    Color: x / Appearance: x / SG: x / pH: x  Gluc: 130 mg/dL / Ketone: x  / Bili: x / Urobili: x   Blood: x / Protein: x / Nitrite: x   Leuk Esterase: x / RBC: x / WBC x   Sq Epi: x / Non Sq Epi: x / Bacteria: x        Culture - Blood (collected 03 Jun 2024 13:03)  Source: .Blood Blood-Peripheral  Preliminary Report (06 Jun 2024 20:01):    No growth at 72 Hours    Culture - Blood (collected 03 Jun 2024 13:03)  Source: .Blood Blood-Venous  Preliminary Report (06 Jun 2024 20:01):    No growth at 72 Hours    Culture - Blood (collected 01 Jun 2024 18:50)  Source: .Blood Blood-Peripheral  Gram Stain (04 Jun 2024 03:44):    Growth in anaerobic bottle: Gram Positive Cocci in Clusters  Final Report (05 Jun 2024 10:57):    Growth in anaerobic bottle: Staphylococcus capitis    Isolation of Coagulase negative Staphylococcus from single blood culture    sets may represent    contamination. Contact the Microbiology Department at [phone] if    susceptibility testing is    clinically indicated.    Culture - Blood (collected 01 Jun 2024 18:47)  Source: .Blood Blood-Peripheral  Gram Stain (02 Jun 2024 19:11):    Growth in aerobic bottle: Gram positive cocci in pairs  Final Report (03 Jun 2024 17:35):    Growth in aerobic bottle: Staphylococcus haemolyticus    Isolation of Coagulase negative Staphylococcus from single blood culture    sets may represent    contamination. Contact the Microbiology Department at [phone] if    susceptibility testing is    clinically indicated.    Direct identification is available within approximately 3-5    hours either by Blood Panel Multiplexed PCR or Direct    MALDI-TOF. Details: [email or web address]  Organism: Blood Culture PCR (03 Jun 2024 17:35)  Organism: Blood Culture PCR (03 Jun 2024 17:35)      Method Type: PCR      -  Coagulase negative Staphylococcus: Detec      COVID-19 PCR: NotDetec (06-06-24 @ 12:45)    RADIOLOGY & ADDITIONAL TESTS:    Care Discussed with Consultants/Other Providers:    Patient is a 89y old  Male who presents with a chief complaint of SOB (06 Jun 2024 23:36)      Patient seen and examined at bedside. No overnight events reported.   He feels good today, no complaints.     ALLERGIES:  No Known Allergies    MEDICATIONS  (STANDING):  atorvastatin 40 milliGRAM(s) Oral at bedtime  clopidogrel Tablet 75 milliGRAM(s) Oral daily  dextrose 10% Bolus 125 milliLiter(s) IV Bolus once  dextrose 5%. 1000 milliLiter(s) (100 mL/Hr) IV Continuous <Continuous>  dextrose 5%. 1000 milliLiter(s) (50 mL/Hr) IV Continuous <Continuous>  dextrose 50% Injectable 12.5 Gram(s) IV Push once  dextrose 50% Injectable 25 Gram(s) IV Push once  doxazosin 2 milliGRAM(s) Oral daily  glucagon  Injectable 1 milliGRAM(s) IntraMuscular once  insulin lispro (ADMELOG) corrective regimen sliding scale   SubCutaneous three times a day before meals  insulin lispro (ADMELOG) corrective regimen sliding scale   SubCutaneous at bedtime  latanoprost 0.005% Ophthalmic Solution 1 Drop(s) Both EYES at bedtime  loratadine 10 milliGRAM(s) Oral daily  magnesium oxide 400 milliGRAM(s) Oral three times a day with meals  metoprolol succinate ER 50 milliGRAM(s) Oral daily  multivitamin 1 Tablet(s) Oral daily  rivaroxaban 15 milliGRAM(s) Oral with dinner  sacubitril 24 mG/valsartan 26 mG 1 Tablet(s) Oral two times a day  torsemide 40 milliGRAM(s) Oral daily    MEDICATIONS  (PRN):  acetaminophen     Tablet .. 650 milliGRAM(s) Oral every 6 hours PRN Temp greater or equal to 38C (100.4F), Mild Pain (1 - 3)  aluminum hydroxide/magnesium hydroxide/simethicone Suspension 30 milliLiter(s) Oral every 4 hours PRN Dyspepsia  dextrose Oral Gel 15 Gram(s) Oral once PRN Blood Glucose LESS THAN 70 milliGRAM(s)/deciliter  melatonin 3 milliGRAM(s) Oral at bedtime PRN Insomnia  ondansetron Injectable 4 milliGRAM(s) IV Push every 8 hours PRN Nausea and/or Vomiting    Vital Signs Last 24 Hrs  T(F): 98.2 (07 Jun 2024 06:01), Max: 98.2 (07 Jun 2024 06:01)  HR: 72 (07 Jun 2024 06:01) (69 - 76)  BP: 127/68 (07 Jun 2024 06:01) (127/68 - 148/87)  RR: 18 (07 Jun 2024 06:01) (16 - 18)  SpO2: 98% (07 Jun 2024 06:01) (95% - 98%)  I&O's Summary    06 Jun 2024 07:01  -  07 Jun 2024 07:00  --------------------------------------------------------  IN: 400 mL / OUT: 700 mL / NET: -300 mL      PHYSICAL EXAM:  General: NAD, A/O x 3  ENT: No gross hearing impairment, Moist mucous membranes, no thrush  Neck: Supple, No JVD  Lungs:  good air entry, non-labored breathing, decreased breath sounds  Cardio: RRR, S1/S2, No murmur  Abdomen: Soft, Nontender, Nondistended; Bowel sounds present  Extremities: No calf tenderness, No cyanosis, No pitting edema  Psych: Appropriate mood and affect    LABS:                        10.2   5.85  )-----------( 154      ( 05 Jun 2024 05:55 )             32.0     06-07    138  |  105  |  51  ----------------------------<  130  3.7   |  26  |  1.54    Ca    8.5      07 Jun 2024 05:33  Phos  2.9     06-07  Mg     2.1     06-07                                    POCT Blood Glucose.: 177 mg/dL (06 Jun 2024 21:55)  POCT Blood Glucose.: 178 mg/dL (06 Jun 2024 16:58)  POCT Blood Glucose.: 183 mg/dL (06 Jun 2024 11:37)  POCT Blood Glucose.: 172 mg/dL (06 Jun 2024 08:16)      Urinalysis Basic - ( 07 Jun 2024 05:33 )    Color: x / Appearance: x / SG: x / pH: x  Gluc: 130 mg/dL / Ketone: x  / Bili: x / Urobili: x   Blood: x / Protein: x / Nitrite: x   Leuk Esterase: x / RBC: x / WBC x   Sq Epi: x / Non Sq Epi: x / Bacteria: x        Culture - Blood (collected 03 Jun 2024 13:03)  Source: .Blood Blood-Peripheral  Preliminary Report (06 Jun 2024 20:01):    No growth at 72 Hours    Culture - Blood (collected 03 Jun 2024 13:03)  Source: .Blood Blood-Venous  Preliminary Report (06 Jun 2024 20:01):    No growth at 72 Hours    Culture - Blood (collected 01 Jun 2024 18:50)  Source: .Blood Blood-Peripheral  Gram Stain (04 Jun 2024 03:44):    Growth in anaerobic bottle: Gram Positive Cocci in Clusters  Final Report (05 Jun 2024 10:57):    Growth in anaerobic bottle: Staphylococcus capitis    Isolation of Coagulase negative Staphylococcus from single blood culture    sets may represent    contamination. Contact the Microbiology Department at [phone] if    susceptibility testing is    clinically indicated.    Culture - Blood (collected 01 Jun 2024 18:47)  Source: .Blood Blood-Peripheral  Gram Stain (02 Jun 2024 19:11):    Growth in aerobic bottle: Gram positive cocci in pairs  Final Report (03 Jun 2024 17:35):    Growth in aerobic bottle: Staphylococcus haemolyticus    Isolation of Coagulase negative Staphylococcus from single blood culture    sets may represent    contamination. Contact the Microbiology Department at [phone] if    susceptibility testing is    clinically indicated.    Direct identification is available within approximately 3-5    hours either by Blood Panel Multiplexed PCR or Direct    MALDI-TOF. Details: [email or web address]  Organism: Blood Culture PCR (03 Jun 2024 17:35)  Organism: Blood Culture PCR (03 Jun 2024 17:35)      Method Type: PCR      -  Coagulase negative Staphylococcus: Detec      COVID-19 PCR: NotDetec (06-06-24 @ 12:45)    RADIOLOGY & ADDITIONAL TESTS:  < from: TTE Echo Complete w/o Contrast w/ Doppler (06.03.24 @ 09:42) >      Summary:   1. Technically difficult study with poor endocardial visualization.   2. The left ventricle endocardium is not well visualized, appears to   have moderately reduced LV systolic function in limited views, estimated   visual LVEF    40%. Abnormal septal motion likely dueto paced rhythm. Consider use of   IV ultrasonic enhancing agent to better evaluate endocardium and for more   accurate assessment of LVEF, if clinically indicated.   3. Mildly increased LV wall thickness.   4. Normal left ventricular internal cavitysize.   5. The right ventricle is not well visualized, appears dilated.   6. Mild mitral valve regurgitation.   7. Mild tricuspid regurgitation.   8. There is a bioprosthetic valve in the aortic postion, appears well   seated with peak velocity within normal limits.   9. The pericardium was not well visualized.  10. Subcostal window not well visualized.    < end of copied text >    Care Discussed with Consultants/Other Providers:    Patient is a 89y old  Male who presents with a chief complaint of SOB (06 Jun 2024 23:36)      Patient seen and examined at bedside. No overnight events reported.   He feels good today, no complaints.     ALLERGIES:  No Known Allergies    MEDICATIONS  (STANDING):  atorvastatin 40 milliGRAM(s) Oral at bedtime  clopidogrel Tablet 75 milliGRAM(s) Oral daily  dextrose 10% Bolus 125 milliLiter(s) IV Bolus once  dextrose 5%. 1000 milliLiter(s) (100 mL/Hr) IV Continuous <Continuous>  dextrose 5%. 1000 milliLiter(s) (50 mL/Hr) IV Continuous <Continuous>  dextrose 50% Injectable 12.5 Gram(s) IV Push once  dextrose 50% Injectable 25 Gram(s) IV Push once  doxazosin 2 milliGRAM(s) Oral daily  glucagon  Injectable 1 milliGRAM(s) IntraMuscular once  insulin lispro (ADMELOG) corrective regimen sliding scale   SubCutaneous three times a day before meals  insulin lispro (ADMELOG) corrective regimen sliding scale   SubCutaneous at bedtime  latanoprost 0.005% Ophthalmic Solution 1 Drop(s) Both EYES at bedtime  loratadine 10 milliGRAM(s) Oral daily  magnesium oxide 400 milliGRAM(s) Oral three times a day with meals  metoprolol succinate ER 50 milliGRAM(s) Oral daily  multivitamin 1 Tablet(s) Oral daily  rivaroxaban 15 milliGRAM(s) Oral with dinner  sacubitril 24 mG/valsartan 26 mG 1 Tablet(s) Oral two times a day  torsemide 40 milliGRAM(s) Oral daily    MEDICATIONS  (PRN):  acetaminophen     Tablet .. 650 milliGRAM(s) Oral every 6 hours PRN Temp greater or equal to 38C (100.4F), Mild Pain (1 - 3)  aluminum hydroxide/magnesium hydroxide/simethicone Suspension 30 milliLiter(s) Oral every 4 hours PRN Dyspepsia  dextrose Oral Gel 15 Gram(s) Oral once PRN Blood Glucose LESS THAN 70 milliGRAM(s)/deciliter  melatonin 3 milliGRAM(s) Oral at bedtime PRN Insomnia  ondansetron Injectable 4 milliGRAM(s) IV Push every 8 hours PRN Nausea and/or Vomiting    Vital Signs Last 24 Hrs  T(F): 98.2 (07 Jun 2024 06:01), Max: 98.2 (07 Jun 2024 06:01)  HR: 72 (07 Jun 2024 06:01) (69 - 76)  BP: 127/68 (07 Jun 2024 06:01) (127/68 - 148/87)  RR: 18 (07 Jun 2024 06:01) (16 - 18)  SpO2: 98% (07 Jun 2024 06:01) (95% - 98%)  I&O's Summary    06 Jun 2024 07:01  -  07 Jun 2024 07:00  --------------------------------------------------------  IN: 400 mL / OUT: 700 mL / NET: -300 mL      PHYSICAL EXAM:  General: NAD, A/O x 3  ENT: No gross hearing impairment, Moist mucous membranes, no thrush  Neck: Supple, No JVD  Lungs:  good air entry, non-labored breathing, decreased breath sounds  Cardio: RRR, S1/S2, No murmur  Abdomen: Soft, Nontender, Nondistended; Bowel sounds present  Extremities: No calf tenderness, No cyanosis, No pitting edema  Psych: Appropriate mood and affect    LABS:                        10.2   5.85  )-----------( 154      ( 05 Jun 2024 05:55 )             32.0     06-07    138  |  105  |  51  ----------------------------<  130  3.7   |  26  |  1.54    Ca    8.5      07 Jun 2024 05:33  Phos  2.9     06-07  Mg     2.1     06-07                                    POCT Blood Glucose.: 177 mg/dL (06 Jun 2024 21:55)  POCT Blood Glucose.: 178 mg/dL (06 Jun 2024 16:58)  POCT Blood Glucose.: 183 mg/dL (06 Jun 2024 11:37)  POCT Blood Glucose.: 172 mg/dL (06 Jun 2024 08:16)      Urinalysis Basic - ( 07 Jun 2024 05:33 )    Color: x / Appearance: x / SG: x / pH: x  Gluc: 130 mg/dL / Ketone: x  / Bili: x / Urobili: x   Blood: x / Protein: x / Nitrite: x   Leuk Esterase: x / RBC: x / WBC x   Sq Epi: x / Non Sq Epi: x / Bacteria: x        Culture - Blood (collected 03 Jun 2024 13:03)  Source: .Blood Blood-Peripheral  Preliminary Report (06 Jun 2024 20:01):    No growth at 72 Hours    Culture - Blood (collected 03 Jun 2024 13:03)  Source: .Blood Blood-Venous  Preliminary Report (06 Jun 2024 20:01):    No growth at 72 Hours    Culture - Blood (collected 01 Jun 2024 18:50)  Source: .Blood Blood-Peripheral  Gram Stain (04 Jun 2024 03:44):    Growth in anaerobic bottle: Gram Positive Cocci in Clusters  Final Report (05 Jun 2024 10:57):    Growth in anaerobic bottle: Staphylococcus capitis    Isolation of Coagulase negative Staphylococcus from single blood culture    sets may represent    contamination. Contact the Microbiology Department at [phone] if    susceptibility testing is    clinically indicated.    Culture - Blood (collected 01 Jun 2024 18:47)  Source: .Blood Blood-Peripheral  Gram Stain (02 Jun 2024 19:11):    Growth in aerobic bottle: Gram positive cocci in pairs  Final Report (03 Jun 2024 17:35):    Growth in aerobic bottle: Staphylococcus haemolyticus    Isolation of Coagulase negative Staphylococcus from single blood culture    sets may represent    contamination. Contact the Microbiology Department at [phone] if    susceptibility testing is    clinically indicated.    Direct identification is available within approximately 3-5    hours either by Blood Panel Multiplexed PCR or Direct    MALDI-TOF. Details: [email or web address]  Organism: Blood Culture PCR (03 Jun 2024 17:35)  Organism: Blood Culture PCR (03 Jun 2024 17:35)      Method Type: PCR      -  Coagulase negative Staphylococcus: Detec      COVID-19 PCR: NotDetec (06-06-24 @ 12:45)    RADIOLOGY & ADDITIONAL TESTS:  < from: TTE Echo Complete w/o Contrast w/ Doppler (06.03.24 @ 09:42) >      Summary:   1. Technically difficult study with poor endocardial visualization.   2. The left ventricle endocardium is not well visualized, appears to   have moderately reduced LV systolic function in limited views, estimated   visual LVEF    40%. Abnormal septal motion likely dueto paced rhythm. Consider use of   IV ultrasonic enhancing agent to better evaluate endocardium and for more   accurate assessment of LVEF, if clinically indicated.   3. Mildly increased LV wall thickness.   4. Normal left ventricular internal cavitysize.   5. The right ventricle is not well visualized, appears dilated.   6. Mild mitral valve regurgitation.   7. Mild tricuspid regurgitation.   8. There is a bioprosthetic valve in the aortic postion, appears well   seated with peak velocity within normal limits.   9. The pericardium was not well visualized.  10. Subcostal window not well visualized.    < end of copied text >    Care Discussed with Consultants/Other Providers:   yes with cardiology

## 2024-06-07 NOTE — PROGRESS NOTE ADULT - SUBJECTIVE AND OBJECTIVE BOX
No distress    Vital Signs Last 24 Hrs  T(C): 36.8 (06-07-24 @ 06:01), Max: 36.8 (06-07-24 @ 06:01)  T(F): 98.2 (06-07-24 @ 06:01), Max: 98.2 (06-07-24 @ 06:01)  HR: 72 (06-07-24 @ 06:01) (72 - 72)  BP: 127/68 (06-07-24 @ 06:01) (127/68 - 127/68)  RR: 18 (06-07-24 @ 06:01) (18 - 18)  SpO2: 98% (06-07-24 @ 06:01) (98% - 98%)    I&O's Detail    06 Jun 2024 07:01  -  07 Jun 2024 07:00  --------------------------------------------------------  IN:    Oral Fluid: 400 mL  Total IN: 400 mL    OUT:    Incontinent per Condom Catheter (mL): 700 mL  Total OUT: 700 mL    s1s2  b/l air entry  soft, ND  + edema, stasis changes                               07 Jun 2024 05:33    138    |  105    |  51     ----------------------------<  130    3.7     |  26     |  1.54     Ca    8.5        07 Jun 2024 05:33  Phos  2.9       07 Jun 2024 05:33  Mg     2.1       07 Jun 2024 05:33    acetaminophen     Tablet .. 650 milliGRAM(s) Oral every 6 hours PRN  aluminum hydroxide/magnesium hydroxide/simethicone Suspension 30 milliLiter(s) Oral every 4 hours PRN  atorvastatin 40 milliGRAM(s) Oral at bedtime  clopidogrel Tablet 75 milliGRAM(s) Oral daily  dextrose 10% Bolus 125 milliLiter(s) IV Bolus once  dextrose 5%. 1000 milliLiter(s) IV Continuous <Continuous>  dextrose 5%. 1000 milliLiter(s) IV Continuous <Continuous>  dextrose 50% Injectable 25 Gram(s) IV Push once  dextrose 50% Injectable 12.5 Gram(s) IV Push once  dextrose Oral Gel 15 Gram(s) Oral once PRN  doxazosin 2 milliGRAM(s) Oral daily  glucagon  Injectable 1 milliGRAM(s) IntraMuscular once  insulin lispro (ADMELOG) corrective regimen sliding scale   SubCutaneous three times a day before meals  insulin lispro (ADMELOG) corrective regimen sliding scale   SubCutaneous at bedtime  latanoprost 0.005% Ophthalmic Solution 1 Drop(s) Both EYES at bedtime  loratadine 10 milliGRAM(s) Oral daily  melatonin 3 milliGRAM(s) Oral at bedtime PRN  metoprolol succinate ER 50 milliGRAM(s) Oral daily  multivitamin 1 Tablet(s) Oral daily  ondansetron Injectable 4 milliGRAM(s) IV Push every 8 hours PRN  rivaroxaban 15 milliGRAM(s) Oral with dinner  sacubitril 24 mG/valsartan 26 mG 1 Tablet(s) Oral two times a day  torsemide 40 milliGRAM(s) Oral daily    A/P:    CM, EF ~ 40%  Adm w/edema, SOB  Baseline Cr is unknown  Overall stable renal indices  Stable BMP on Entresto, Torsemide   SONO w/o hydro  UA - bland  Avoid nephrotoxins   No NSAID's    [phone]

## 2024-06-07 NOTE — PROGRESS NOTE ADULT - PROVIDER SPECIALTY LIST ADULT
Cardiology
Cardiology
Nephrology
Cardiology
Hospitalist
Hospitalist
Nephrology
Pulmonology
Hospitalist
Nephrology
Pulmonology
Hospitalist

## 2024-06-07 NOTE — DISCHARGE NOTE NURSING/CASE MANAGEMENT/SOCIAL WORK - NSDCPEFALRISK_GEN_ALL_CORE
For information on Fall & Injury Prevention, visit: [email or web address] OR  [email or web address] OR  https://www.cdc.gov/steadi/patient.html

## 2024-06-07 NOTE — PROGRESS NOTE ADULT - ASSESSMENT
Richard Davila is a 89 year old man with past medical history of Coronary artery disease (s/p CABG & PCIs), HFrEF (LVEF 30%), Atrial fibrillation (on Xarelto), TAVR, Permanent pacemaker, Hypertension, Chronic kidney disease, Prostate cancer (s/p surgery) admitted w/ progressive dyspnea and leg swelling; CHF.     Acute on Chronic Systolic CHF  Transient Asymptomatic V Tach on Tele   pt presented with worsening SOB, LE edema, BNP 3408, Tn negative  EKG sinus with CHB, ventricular paced, , pt has PPM  Cardiology following, pt is followed by Dr. Richard Avitia at [facility], previous ECHO showed EF ~ 30%  ECHO done during this admission shows EF ~ 40%, mild MR/TR, bioprosthetic valve in aortic position  Cardiology following; pt started on Entresto, cont Torsemide 40 mg daily  [employer] PPM interrogation  Monitor daily weights, Strict I/Os, Dopplers negative  Covid swab neg.   f/u PT eval today--pt needs to be ambulatory    Urinary Retention in pt with AUS  urology evaluation appreciated  do not attempt hansen placement in pt with artificial urinary sphincter  AUS has been re activated by Urology, monitor to ensure pt is properly voiding    Abnormal CT Chest  Hx of Lung Cancer  Chronic Cough, dry  CT chest revealed ill defined patchy and clustered bilateral lung opacities  left lower lobe subpleural 4 mm nodule vs intrapulmonary lymph node, RT lung post surgical changes  small loculated and nodular bilateral pleural effusions  pulmonology evaluation appreciated  stopped zosyn, low suspicion for acute pneumonia, follow up repeat blood cultures (BC grew coag neg staph in one bottle, presumed to be contaminant)  Strep pneumo Ag and Urine legionella negative, RVP negative  Continue nebulizers for now, dyspnea likely r/t CHF  Stable respiratory status off oxygen requirements, continue claritin for possible seasonal allergies    ALVIN/?CKD3  baseline Cr unknown  renal evaluation appreciated, overall stable renal indices  SONO without hydro, no objection to diuresis  avoid nephrotoxins, follow BMP  UA reviewed    T2DM  metformin being held  continue moderate ISS, FS    CAD s/p CABG and PCI  Atrial Fibrillation, persistent  cardiology following, ECHO as above  continue xarelto for stroke ppx  no evidence of ACS, continue plavix and high intensity statin therapies  rate control with metoprolol, monitor on telemetry    Prophylactic Measure  heparin    Dispo:  probable D/C back to [facility] today   Richard Davila is a 89 year old man with past medical history of Coronary artery disease (s/p CABG & PCIs), HFrEF (LVEF 30%), Atrial fibrillation (on Xarelto), TAVR, Permanent pacemaker, Hypertension, Chronic kidney disease, Prostate cancer (s/p surgery) admitted w/ progressive dyspnea and leg swelling; CHF.     Acute on Chronic Systolic CHF  Transient Asymptomatic V Tach on Tele   pt presented with worsening SOB, LE edema, BNP 3408, Tn negative  EKG sinus with CHB, ventricular paced, , pt has PPM  Cardiology following, pt is followed by Dr. Richard Avitia at [facility], previous ECHO showed EF ~ 30%  ECHO done during this admission shows EF ~ 40%, mild MR/TR, bioprosthetic valve in aortic position  Cardiology following; pt started on Entresto, cont Torsemide 40 mg daily  [employer] PPM interrogation  Monitor daily weights, Strict I/Os, Dopplers negative  Covid swab neg.     Urinary Retention in pt with AUS  urology evaluation appreciated; do not attempt hansen placement in pt with artificial urinary sphincter  AUS has been re activated by Urology, monitor to ensure pt is properly voiding    Abnormal CT Chest  Hx of Lung Cancer  Chronic Cough, dry  CT chest revealed ill defined patchy and clustered bilateral lung opacities  left lower lobe subpleural 4 mm nodule vs intrapulmonary lymph node, RT lung post surgical changes  small loculated and nodular bilateral pleural effusions  pulmonology evaluation appreciated  stopped zosyn, low suspicion for acute pneumonia, follow up repeat blood cultures (BC grew coag neg staph in one bottle, presumed to be contaminant)  Strep pneumo Ag and Urine legionella negative, RVP negative  Continue nebulizers for now, dyspnea likely r/t CHF  Stable respiratory status off oxygen requirements, continue claritin for possible seasonal allergies    ALVIN/?CKD3  baseline Cr unknown  renal evaluation appreciated, overall stable renal indices  SONO without hydro, no objection to diuresis  avoid nephrotoxins, follow BMP  UA reviewed    T2DM  metformin being held  continue moderate ISS, FS    CAD s/p CABG and PCI  Atrial Fibrillation, persistent  cardiology following, ECHO as above  continue xarelto for stroke ppx  no evidence of ACS, continue plavix and high intensity statin therapies  rate control with metoprolol, monitor on telemetry    Prophylactic Measure  heparin    Dispo:  D/C back to [facility] today   Richard Tellezbebe is a 89 year old man with past medical history of Coronary artery disease (s/p CABG & PCIs), HFrEF (LVEF 30%), Atrial fibrillation (on Xarelto), TAVR, Permanent pacemaker, Hypertension, Chronic kidney disease, Prostate cancer (s/p surgery) admitted w/ progressive dyspnea and leg swelling; CHF.     Acute on Chronic Systolic CHF  Transient Asymptomatic V Tach on Tele   pt presented with worsening SOB, LE edema, BNP 3408, Tn negative  EKG sinus with CHB, ventricular paced, , pt has PPM  Cardiology following, pt is followed by Dr. Richard Avitia at [facility], previous ECHO showed EF ~ 30%  ECHO done during this admission shows EF ~ 40%, mild MR/TR, bioprosthetic valve in aortic position  Cardiology following; pt started on Entresto, cont Torsemide 40 mg daily  [facility] PPM interrogation  Monitor daily weights, Strict I/Os, Dopplers negative  Covid swab neg.   pt started on Entresto yesterday, BP's are soft so will hold off on starting Farxiga on this admission, pt will f/u with his Cardiologist and possibly start Farxiga as an outpatient    Urinary Retention in pt with AUS  urology evaluation appreciated; do not attempt hansen placement in pt with artificial urinary sphincter  AUS has been re activated by Urology, monitor to ensure pt is properly voiding    Abnormal CT Chest  Hx of Lung Cancer  Chronic Cough, dry  CT chest revealed ill defined patchy and clustered bilateral lung opacities  left lower lobe subpleural 4 mm nodule vs intrapulmonary lymph node, RT lung post surgical changes  small loculated and nodular bilateral pleural effusions  pulmonology evaluation appreciated  stopped zosyn, low suspicion for acute pneumonia, follow up repeat blood cultures (BC grew coag neg staph in one bottle, presumed to be contaminant)  Strep pneumo Ag and Urine legionella negative, RVP negative  Continue nebulizers for now, dyspnea likely r/t CHF  Stable respiratory status off oxygen requirements, continue claritin for possible seasonal allergies    ALVIN/?CKD3  baseline Cr unknown  renal evaluation appreciated, overall stable renal indices  SONO without hydro, no objection to diuresis  avoid nephrotoxins, follow BMP  UA reviewed    T2DM  metformin being held  continue moderate ISS, FS    CAD s/p CABG and PCI  Atrial Fibrillation, persistent  cardiology following, ECHO as above  continue xarelto for stroke ppx  no evidence of ACS, continue plavix and high intensity statin therapies  rate control with metoprolol, monitor on telemetry    Prophylactic Measure  heparin    Dispo:  D/C back to [facility] today, family notified.    Richard Lola is a 89 year old man with past medical history of Coronary artery disease (s/p CABG & PCIs), HFrEF (LVEF 30%), Atrial fibrillation (on Xarelto), TAVR, Permanent pacemaker, Hypertension, Chronic kidney disease, Prostate cancer (s/p surgery) admitted w/ progressive dyspnea and leg swelling; CHF.     Acute on Chronic Systolic CHF  Transient Asymptomatic V Tach on Tele   pt presented with worsening SOB, LE edema, BNP 3408, Tn negative  EKG sinus with CHB, ventricular paced, , pt has PPM.   Cardiology following, pt is followed by Dr. Richard Avitia at [facility], previous ECHO showed EF ~ 30%  ECHO done during this admission shows EF ~ 40%, mild MR/TR, bioprosthetic valve in aortic position  Cardiology following; pt started on Entresto, cont Torsemide 40 mg daily  [employer] PPM interrogation- PPM interogated today 6/7 and changed to Tracking Mode per tech  Monitor daily weights, Strict I/Os, Dopplers negative  Covid swab neg.   pt started on Entresto yesterday, BP's are soft so will hold off on starting Farxiga on this admission, pt will f/u with his Cardiologist and possibly start Farxiga as an outpatient    Urinary Retention in pt with AUS  urology evaluation appreciated; do not attempt hansen placement in pt with artificial urinary sphincter  AUS has been re activated by Urology, monitor to ensure pt is properly voiding    Abnormal CT Chest  Hx of Lung Cancer  Chronic Cough, dry  CT chest revealed ill defined patchy and clustered bilateral lung opacities  left lower lobe subpleural 4 mm nodule vs intrapulmonary lymph node, RT lung post surgical changes  small loculated and nodular bilateral pleural effusions  pulmonology evaluation appreciated  stopped zosyn, low suspicion for acute pneumonia, follow up repeat blood cultures (BC grew coag neg staph in one bottle, presumed to be contaminant)  Strep pneumo Ag and Urine legionella negative, RVP negative  Continue nebulizers for now, dyspnea likely r/t CHF  Stable respiratory status off oxygen requirements, continue Claritin for possible seasonal allergies    ALVIN/?CKD3  baseline Cr unknown  renal evaluation appreciated, overall stable renal indices  SONO without hydro, no objection to diuresis  avoid nephrotoxins, follow BMP  UA reviewed    T2DM  metformin being held  continue moderate ISS, FS    CAD s/p CABG and PCI  Atrial Fibrillation, persistent  cardiology following, ECHO as above  continue xarelto for stroke ppx  no evidence of ACS, continue plavix and high intensity statin therapies  rate control with metoprolol, monitor on telemetry    Prophylactic Measure  heparin    Dispo:  D/C back to [facility] today, family notified.

## 2024-06-07 NOTE — PROGRESS NOTE ADULT - NS ATTEND AMEND GEN_ALL_CORE FT
reviewed AM BMP; Cr stable and electrolyte wnl. Spoke with PPM interrogating staff - PPM interogated today 6/7 and changed to Tracking Mode per tech. Plan for d/c to [facility] today

## 2024-06-07 NOTE — PROGRESS NOTE ADULT - SUBJECTIVE AND OBJECTIVE BOX
DIANA CARVER  [id number]      Chief Complaint: Congestive heart failure/Pneumonia    Interval History: The patient reports breathing is stable.    Tele: ventricular paced 70s BPM      Current meds:   acetaminophen     Tablet .. 650 milliGRAM(s) Oral every 6 hours PRN  aluminum hydroxide/magnesium hydroxide/simethicone Suspension 30 milliLiter(s) Oral every 4 hours PRN  atorvastatin 40 milliGRAM(s) Oral at bedtime  clopidogrel Tablet 75 milliGRAM(s) Oral daily  dextrose 10% Bolus 125 milliLiter(s) IV Bolus once  dextrose 5%. 1000 milliLiter(s) IV Continuous <Continuous>  dextrose 5%. 1000 milliLiter(s) IV Continuous <Continuous>  dextrose 50% Injectable 12.5 Gram(s) IV Push once  dextrose 50% Injectable 25 Gram(s) IV Push once  dextrose Oral Gel 15 Gram(s) Oral once PRN  doxazosin 2 milliGRAM(s) Oral daily  furosemide   Injectable 40 milliGRAM(s) IV Push daily  glucagon  Injectable 1 milliGRAM(s) IntraMuscular once  insulin lispro (ADMELOG) corrective regimen sliding scale   SubCutaneous three times a day before meals  insulin lispro (ADMELOG) corrective regimen sliding scale   SubCutaneous at bedtime  latanoprost 0.005% Ophthalmic Solution 1 Drop(s) Both EYES at bedtime  loratadine 10 milliGRAM(s) Oral daily  magnesium oxide 400 milliGRAM(s) Oral three times a day with meals  melatonin 3 milliGRAM(s) Oral at bedtime PRN  metoprolol succinate ER 50 milliGRAM(s) Oral daily  multivitamin 1 Tablet(s) Oral daily  ondansetron Injectable 4 milliGRAM(s) IV Push every 8 hours PRN  potassium chloride   Powder 40 milliEquivalent(s) Oral every 2 hours  rivaroxaban 15 milliGRAM(s) Oral with dinner      Objective:       Vital Signs:   T(C): 36.6 (06-05-24 @ 05:04), Max: 36.8 (06-04-24 @ 19:25)  HR: 72 (06-05-24 @ 05:04) (69 - 104)  BP: 145/69 (06-05-24 @ 05:04) (136/69 - 156/80)  RR: 16 (06-05-24 @ 05:04) (16 - 18)  SpO2: 99% (06-05-24 @ 05:04) (96% - 99%)    Physical Exam:   General: no distress  HEENT: sclera anicteric  Neck: supple  CVS: JVP ~ 10 cm H20, irregularly irregular, s1, s2  Chest: unlabored respirations, coarse breath sounds  Abdomen: non-distended  Extremities: chronic lower extremity venous skin changes  Neuro: awake, alert & oriented  Psych: normal affect        Labs:   05 Jun 2024 05:55    136    |  101    |  44     ----------------------------<  124    3.1     |  25     |  1.55     Ca    8.9        05 Jun 2024 05:55  Mg     1.9       05 Jun 2024 05:55                            10.2   5.85  )-----------( 154      ( 05 Jun 2024 05:55 )             32.0             TTE Report from [place] (2021):  LVEF 55-60%  Normal RV systolic function  S/p TAVR, normally functioning   Mild MR, Mild TR  No pericardial effusion       TTE (6/3/24):   1. Technically difficult study with poor endocardial visualization.   2. The left ventricle endocardium is not well visualized, appears to   have moderately reduced LV systolic function in limited views, estimated visual LVEF 40%. Abnormal septal motion likely due to paced rhythm. Consider use of IV ultrasonic enhancing agent to better evaluate endocardium and for more accurate assessment of LVEF, if clinically indicated.   3. Mildly increased LV wall thickness.   4. Normal left ventricular internal cavity size.   5. The right ventricle is not well visualized, appears dilated.   6. Mild mitral valve regurgitation.   7. Mild tricuspid regurgitation.   8. There is a bioprosthetic valve in the aortic postion, appears well   seated with peak velocity within normal limits.   9. The pericardium was not well visualized.  10. Subcostal window not well visualized.        ECG (6/1/24): ventricular paced

## 2024-06-07 NOTE — DISCHARGE NOTE NURSING/CASE MANAGEMENT/SOCIAL WORK - PATIENT PORTAL LINK FT
You can access the FollowMyHealth Patient Portal offered by [facility] by registering at the following website: [email or web address]. By joining [facility]’s FollowMyHealth portal, you will also be able to view your health information using other applications (apps) compatible with our system.

## 2024-06-07 NOTE — PROGRESS NOTE ADULT - ASSESSMENT
Assessment: 89 year old man with past medical history of Coronary artery disease (s/p CABG & PCIs), HFrEF (LVEF 30%), Atrial fibrillation (on Xarelto), TAVR, Permanent pacemaker, Hypertension, Chronic kidney disease, Prostate cancer (s/p surgery) brought in by EMS from assisted living facility due to progressive dyspnea and leg swelling, found to have signs of congestive heart failure with pneumonia.    ECG consistent with ventricular paced rhythm. Troponins negative x 2. CT chest consistent with bilateral lung opacities and small loculated and nodular bilateral pleural effusions. Pro BNP elevated, Cr elevated. Leg US negative for DVT.    Recommendations:  - TTE here consistent with LVEF 40%, pt presented TTE from outpt with EF 30%  - transitioned to Torsemide 40 mg PO daily and Entresto, further GDMT as renal indices allow  - Continue Metoprolol succinate. Recommend [employer] PPM interrogation (, awaiting results).    - Continue Xarelto for stroke prophylaxis

## 2024-06-08 LAB
CULTURE RESULTS: SIGNIFICANT CHANGE UP
CULTURE RESULTS: SIGNIFICANT CHANGE UP
SPECIMEN SOURCE: SIGNIFICANT CHANGE UP
SPECIMEN SOURCE: SIGNIFICANT CHANGE UP

## 2024-06-12 ENCOUNTER — INPATIENT (INPATIENT)
Facility: HOSPITAL | Age: 89
LOS: 7 days | Discharge: OTHER INSTITUTION | DRG: 379 | End: 2024-06-20
Attending: INTERNAL MEDICINE | Admitting: STUDENT IN AN ORGANIZED HEALTH CARE EDUCATION/TRAINING PROGRAM
Payer: MEDICARE

## 2024-06-12 VITALS
HEART RATE: 93 BPM | DIASTOLIC BLOOD PRESSURE: 72 MMHG | RESPIRATION RATE: 18 BRPM | HEIGHT: 69 IN | SYSTOLIC BLOOD PRESSURE: 129 MMHG | TEMPERATURE: 97 F | OXYGEN SATURATION: 96 % | WEIGHT: 171.96 LBS

## 2024-06-12 DIAGNOSIS — K92.1 MELENA: ICD-10-CM

## 2024-06-12 LAB
ALBUMIN SERPL ELPH-MCNC: 2.7 G/DL — LOW (ref 3.3–5)
ALP SERPL-CCNC: 167 U/L — HIGH (ref 40–120)
ALT FLD-CCNC: 28 U/L — SIGNIFICANT CHANGE UP (ref 10–45)
ANION GAP SERPL CALC-SCNC: 13 MMOL/L — SIGNIFICANT CHANGE UP (ref 5–17)
APTT BLD: 40.9 SEC — HIGH (ref 24.5–35.6)
AST SERPL-CCNC: 27 U/L — SIGNIFICANT CHANGE UP (ref 10–40)
BASOPHILS # BLD AUTO: 0.03 K/UL — SIGNIFICANT CHANGE UP (ref 0–0.2)
BASOPHILS NFR BLD AUTO: 0.4 % — SIGNIFICANT CHANGE UP (ref 0–2)
BILIRUB SERPL-MCNC: 0.7 MG/DL — SIGNIFICANT CHANGE UP (ref 0.2–1.2)
BUN SERPL-MCNC: 132 MG/DL — HIGH (ref 7–23)
CALCIUM SERPL-MCNC: 9.6 MG/DL — SIGNIFICANT CHANGE UP (ref 8.4–10.5)
CHLORIDE SERPL-SCNC: 101 MMOL/L — SIGNIFICANT CHANGE UP (ref 96–108)
CO2 SERPL-SCNC: 22 MMOL/L — SIGNIFICANT CHANGE UP (ref 22–31)
CREAT SERPL-MCNC: 2.85 MG/DL — HIGH (ref 0.5–1.3)
EGFR: 19 ML/MIN/1.73M2 — LOW
EOSINOPHIL # BLD AUTO: 0.01 K/UL — SIGNIFICANT CHANGE UP (ref 0–0.5)
EOSINOPHIL NFR BLD AUTO: 0.1 % — SIGNIFICANT CHANGE UP (ref 0–6)
GLUCOSE SERPL-MCNC: 219 MG/DL — HIGH (ref 70–99)
HCT VFR BLD CALC: 22.2 % — LOW (ref 39–50)
HGB BLD-MCNC: 7.2 G/DL — LOW (ref 13–17)
IMM GRANULOCYTES NFR BLD AUTO: 0.4 % — SIGNIFICANT CHANGE UP (ref 0–0.9)
INR BLD: 3.17 RATIO — HIGH (ref 0.85–1.18)
LYMPHOCYTES # BLD AUTO: 0.43 K/UL — LOW (ref 1–3.3)
LYMPHOCYTES # BLD AUTO: 6.4 % — LOW (ref 13–44)
MCHC RBC-ENTMCNC: 24.7 PG — LOW (ref 27–34)
MCHC RBC-ENTMCNC: 32.4 GM/DL — SIGNIFICANT CHANGE UP (ref 32–36)
MCV RBC AUTO: 76.3 FL — LOW (ref 80–100)
MONOCYTES # BLD AUTO: 0.58 K/UL — SIGNIFICANT CHANGE UP (ref 0–0.9)
MONOCYTES NFR BLD AUTO: 8.6 % — SIGNIFICANT CHANGE UP (ref 2–14)
NEUTROPHILS # BLD AUTO: 5.68 K/UL — SIGNIFICANT CHANGE UP (ref 1.8–7.4)
NEUTROPHILS NFR BLD AUTO: 84.1 % — HIGH (ref 43–77)
NRBC # BLD: 0 /100 WBCS — SIGNIFICANT CHANGE UP (ref 0–0)
OB PNL STL: POSITIVE
PLATELET # BLD AUTO: 181 K/UL — SIGNIFICANT CHANGE UP (ref 150–400)
POTASSIUM SERPL-MCNC: 5.5 MMOL/L — HIGH (ref 3.5–5.3)
POTASSIUM SERPL-SCNC: 5.5 MMOL/L — HIGH (ref 3.5–5.3)
PROT SERPL-MCNC: 6.6 G/DL — SIGNIFICANT CHANGE UP (ref 6–8.3)
PROTHROM AB SERPL-ACNC: 35.9 SEC — HIGH (ref 9.5–13)
RBC # BLD: 2.91 M/UL — LOW (ref 4.2–5.8)
RBC # FLD: 18 % — HIGH (ref 10.3–14.5)
SODIUM SERPL-SCNC: 136 MMOL/L — SIGNIFICANT CHANGE UP (ref 135–145)
TROPONIN I, HIGH SENSITIVITY RESULT: 33.8 NG/L — SIGNIFICANT CHANGE UP
WBC # BLD: 6.76 K/UL — SIGNIFICANT CHANGE UP (ref 3.8–10.5)
WBC # FLD AUTO: 6.76 K/UL — SIGNIFICANT CHANGE UP (ref 3.8–10.5)

## 2024-06-12 PROCEDURE — 70450 CT HEAD/BRAIN W/O DYE: CPT | Mod: 26,MC

## 2024-06-12 PROCEDURE — 99223 1ST HOSP IP/OBS HIGH 75: CPT | Mod: GC

## 2024-06-12 PROCEDURE — 99291 CRITICAL CARE FIRST HOUR: CPT

## 2024-06-12 PROCEDURE — 71045 X-RAY EXAM CHEST 1 VIEW: CPT | Mod: 26

## 2024-06-12 PROCEDURE — 93010 ELECTROCARDIOGRAM REPORT: CPT

## 2024-06-12 RX ORDER — PROTHROMBIN COMPLEX CONCENTRATE (HUMAN) 25.5; 16.5; 24; 22; 22; 26 [IU]/ML; [IU]/ML; [IU]/ML; [IU]/ML; [IU]/ML; [IU]/ML
1500 POWDER, FOR SOLUTION INTRAVENOUS ONCE
Refills: 0 | Status: COMPLETED | OUTPATIENT
Start: 2024-06-12 | End: 2024-06-12

## 2024-06-12 RX ORDER — SODIUM CHLORIDE 0.9 % (FLUSH) 0.9 %
500 SYRINGE (ML) INJECTION ONCE
Refills: 0 | Status: COMPLETED | OUTPATIENT
Start: 2024-06-12 | End: 2024-06-12

## 2024-06-12 RX ADMIN — Medication 500 MILLILITER(S): at 23:50

## 2024-06-13 DIAGNOSIS — R71.0 PRECIPITOUS DROP IN HEMATOCRIT: ICD-10-CM

## 2024-06-13 PROBLEM — E11.9 TYPE 2 DIABETES MELLITUS WITHOUT COMPLICATIONS: Chronic | Status: ACTIVE | Noted: 2024-06-02

## 2024-06-13 LAB
ABO RH CONFIRMATION: SIGNIFICANT CHANGE UP
ALBUMIN SERPL ELPH-MCNC: 2.5 G/DL — LOW (ref 3.3–5)
ALP SERPL-CCNC: 152 U/L — HIGH (ref 40–120)
ALT FLD-CCNC: 26 U/L — SIGNIFICANT CHANGE UP (ref 10–45)
ANION GAP SERPL CALC-SCNC: 11 MMOL/L — SIGNIFICANT CHANGE UP (ref 5–17)
ANION GAP SERPL CALC-SCNC: 14 MMOL/L — SIGNIFICANT CHANGE UP (ref 5–17)
APPEARANCE UR: CLEAR — SIGNIFICANT CHANGE UP
APTT BLD: 35.5 SEC — SIGNIFICANT CHANGE UP (ref 24.5–35.6)
AST SERPL-CCNC: 25 U/L — SIGNIFICANT CHANGE UP (ref 10–40)
BILIRUB SERPL-MCNC: 1.4 MG/DL — HIGH (ref 0.2–1.2)
BILIRUB UR-MCNC: NEGATIVE — SIGNIFICANT CHANGE UP
BUN SERPL-MCNC: 135 MG/DL — HIGH (ref 7–23)
BUN SERPL-MCNC: 136 MG/DL — HIGH (ref 7–23)
CALCIUM SERPL-MCNC: 9.2 MG/DL — SIGNIFICANT CHANGE UP (ref 8.4–10.5)
CALCIUM SERPL-MCNC: 9.7 MG/DL — SIGNIFICANT CHANGE UP (ref 8.4–10.5)
CHLORIDE SERPL-SCNC: 102 MMOL/L — SIGNIFICANT CHANGE UP (ref 96–108)
CHLORIDE SERPL-SCNC: 105 MMOL/L — SIGNIFICANT CHANGE UP (ref 96–108)
CO2 SERPL-SCNC: 20 MMOL/L — LOW (ref 22–31)
CO2 SERPL-SCNC: 24 MMOL/L — SIGNIFICANT CHANGE UP (ref 22–31)
COLOR SPEC: YELLOW — SIGNIFICANT CHANGE UP
CREAT SERPL-MCNC: 2.82 MG/DL — HIGH (ref 0.5–1.3)
CREAT SERPL-MCNC: 2.84 MG/DL — HIGH (ref 0.5–1.3)
DIFF PNL FLD: NEGATIVE — SIGNIFICANT CHANGE UP
EGFR: 19 ML/MIN/1.73M2 — LOW
EGFR: 20 ML/MIN/1.73M2 — LOW
GLUCOSE BLDC GLUCOMTR-MCNC: 120 MG/DL — HIGH (ref 70–99)
GLUCOSE BLDC GLUCOMTR-MCNC: 147 MG/DL — HIGH (ref 70–99)
GLUCOSE BLDC GLUCOMTR-MCNC: 149 MG/DL — HIGH (ref 70–99)
GLUCOSE BLDC GLUCOMTR-MCNC: 167 MG/DL — HIGH (ref 70–99)
GLUCOSE BLDC GLUCOMTR-MCNC: 190 MG/DL — HIGH (ref 70–99)
GLUCOSE BLDC GLUCOMTR-MCNC: 192 MG/DL — HIGH (ref 70–99)
GLUCOSE BLDC GLUCOMTR-MCNC: 216 MG/DL — HIGH (ref 70–99)
GLUCOSE BLDC GLUCOMTR-MCNC: 222 MG/DL — HIGH (ref 70–99)
GLUCOSE BLDC GLUCOMTR-MCNC: 249 MG/DL — HIGH (ref 70–99)
GLUCOSE SERPL-MCNC: 163 MG/DL — HIGH (ref 70–99)
GLUCOSE SERPL-MCNC: 211 MG/DL — HIGH (ref 70–99)
GLUCOSE UR QL: NEGATIVE MG/DL — SIGNIFICANT CHANGE UP
HCT VFR BLD CALC: 21.5 % — LOW (ref 39–50)
HCT VFR BLD CALC: 24.6 % — LOW (ref 39–50)
HGB BLD-MCNC: 7.1 G/DL — LOW (ref 13–17)
HGB BLD-MCNC: 8.3 G/DL — LOW (ref 13–17)
INR BLD: 1.82 RATIO — HIGH (ref 0.85–1.18)
KETONES UR-MCNC: NEGATIVE MG/DL — SIGNIFICANT CHANGE UP
LEUKOCYTE ESTERASE UR-ACNC: ABNORMAL
MAGNESIUM SERPL-MCNC: 2.4 MG/DL — SIGNIFICANT CHANGE UP (ref 1.6–2.6)
MCHC RBC-ENTMCNC: 25.1 PG — LOW (ref 27–34)
MCHC RBC-ENTMCNC: 25.7 PG — LOW (ref 27–34)
MCHC RBC-ENTMCNC: 33 GM/DL — SIGNIFICANT CHANGE UP (ref 32–36)
MCHC RBC-ENTMCNC: 33.7 GM/DL — SIGNIFICANT CHANGE UP (ref 32–36)
MCV RBC AUTO: 76 FL — LOW (ref 80–100)
MCV RBC AUTO: 76.2 FL — LOW (ref 80–100)
NITRITE UR-MCNC: NEGATIVE — SIGNIFICANT CHANGE UP
NRBC # BLD: 0 /100 WBCS — SIGNIFICANT CHANGE UP (ref 0–0)
NRBC # BLD: 0 /100 WBCS — SIGNIFICANT CHANGE UP (ref 0–0)
PH UR: 5 — SIGNIFICANT CHANGE UP (ref 5–8)
PHOSPHATE SERPL-MCNC: 4.6 MG/DL — HIGH (ref 2.5–4.5)
PLATELET # BLD AUTO: 154 K/UL — SIGNIFICANT CHANGE UP (ref 150–400)
PLATELET # BLD AUTO: 165 K/UL — SIGNIFICANT CHANGE UP (ref 150–400)
POTASSIUM SERPL-MCNC: 5.5 MMOL/L — HIGH (ref 3.5–5.3)
POTASSIUM SERPL-MCNC: 5.6 MMOL/L — HIGH (ref 3.5–5.3)
POTASSIUM SERPL-SCNC: 5.5 MMOL/L — HIGH (ref 3.5–5.3)
POTASSIUM SERPL-SCNC: 5.6 MMOL/L — HIGH (ref 3.5–5.3)
PROT SERPL-MCNC: 6.2 G/DL — SIGNIFICANT CHANGE UP (ref 6–8.3)
PROT UR-MCNC: NEGATIVE MG/DL — SIGNIFICANT CHANGE UP
PROTHROM AB SERPL-ACNC: 20.4 SEC — HIGH (ref 9.5–13)
RBC # BLD: 2.83 M/UL — LOW (ref 4.2–5.8)
RBC # BLD: 3.23 M/UL — LOW (ref 4.2–5.8)
RBC # FLD: 17.9 % — HIGH (ref 10.3–14.5)
RBC # FLD: 18.1 % — HIGH (ref 10.3–14.5)
SODIUM SERPL-SCNC: 137 MMOL/L — SIGNIFICANT CHANGE UP (ref 135–145)
SODIUM SERPL-SCNC: 139 MMOL/L — SIGNIFICANT CHANGE UP (ref 135–145)
SP GR SPEC: 1.01 — SIGNIFICANT CHANGE UP (ref 1–1.03)
TROPONIN I, HIGH SENSITIVITY RESULT: 40.2 NG/L — SIGNIFICANT CHANGE UP
UROBILINOGEN FLD QL: 0.2 MG/DL — SIGNIFICANT CHANGE UP (ref 0.2–1)
WBC # BLD: 7.21 K/UL — SIGNIFICANT CHANGE UP (ref 3.8–10.5)
WBC # BLD: 7.83 K/UL — SIGNIFICANT CHANGE UP (ref 3.8–10.5)
WBC # FLD AUTO: 7.21 K/UL — SIGNIFICANT CHANGE UP (ref 3.8–10.5)
WBC # FLD AUTO: 7.83 K/UL — SIGNIFICANT CHANGE UP (ref 3.8–10.5)

## 2024-06-13 PROCEDURE — 93288 INTERROG EVL PM/LDLS PM IP: CPT | Mod: 26

## 2024-06-13 PROCEDURE — 99223 1ST HOSP IP/OBS HIGH 75: CPT

## 2024-06-13 PROCEDURE — 99233 SBSQ HOSP IP/OBS HIGH 50: CPT

## 2024-06-13 PROCEDURE — 99222 1ST HOSP IP/OBS MODERATE 55: CPT

## 2024-06-13 RX ORDER — MAGNESIUM, ALUMINUM HYDROXIDE 400-400
30 TABLET,CHEWABLE ORAL EVERY 4 HOURS
Refills: 0 | Status: DISCONTINUED | OUTPATIENT
Start: 2024-06-13 | End: 2024-06-20

## 2024-06-13 RX ORDER — DEXTROSE 30 % IN WATER 30 %
25 VIAL (ML) INTRAVENOUS ONCE
Refills: 0 | Status: DISCONTINUED | OUTPATIENT
Start: 2024-06-13 | End: 2024-06-20

## 2024-06-13 RX ORDER — DEXTROSE 30 % IN WATER 30 %
12.5 VIAL (ML) INTRAVENOUS ONCE
Refills: 0 | Status: DISCONTINUED | OUTPATIENT
Start: 2024-06-13 | End: 2024-06-20

## 2024-06-13 RX ORDER — INSULIN LISPRO 100 [IU]/ML
INJECTION, SOLUTION SUBCUTANEOUS AT BEDTIME
Refills: 0 | Status: DISCONTINUED | OUTPATIENT
Start: 2024-06-13 | End: 2024-06-20

## 2024-06-13 RX ORDER — INSULIN LISPRO 100 [IU]/ML
INJECTION, SOLUTION SUBCUTANEOUS
Refills: 0 | Status: DISCONTINUED | OUTPATIENT
Start: 2024-06-13 | End: 2024-06-20

## 2024-06-13 RX ORDER — ONDANSETRON HYDROCHLORIDE 2 MG/ML
4 INJECTION INTRAMUSCULAR; INTRAVENOUS EVERY 8 HOURS
Refills: 0 | Status: DISCONTINUED | OUTPATIENT
Start: 2024-06-13 | End: 2024-06-20

## 2024-06-13 RX ORDER — METOPROLOL TARTRATE 50 MG
25 TABLET ORAL
Refills: 0 | Status: DISCONTINUED | OUTPATIENT
Start: 2024-06-13 | End: 2024-06-20

## 2024-06-13 RX ORDER — ATORVASTATIN CALCIUM 80 MG/1
0.5 TABLET, FILM COATED ORAL
Refills: 0 | DISCHARGE

## 2024-06-13 RX ORDER — DOXAZOSIN MESYLATE 2 MG/1
2 TABLET ORAL DAILY
Refills: 0 | Status: DISCONTINUED | OUTPATIENT
Start: 2024-06-13 | End: 2024-06-13

## 2024-06-13 RX ORDER — DEXTROSE 30 % IN WATER 30 %
25 VIAL (ML) INTRAVENOUS ONCE
Refills: 0 | Status: COMPLETED | OUTPATIENT
Start: 2024-06-13 | End: 2024-06-13

## 2024-06-13 RX ORDER — GLUCAGON HYDROCHLORIDE 1 MG/ML
1 INJECTION, POWDER, FOR SOLUTION INTRAMUSCULAR; INTRAVENOUS; SUBCUTANEOUS ONCE
Refills: 0 | Status: DISCONTINUED | OUTPATIENT
Start: 2024-06-13 | End: 2024-06-20

## 2024-06-13 RX ORDER — ATORVASTATIN CALCIUM 20 MG/1
40 TABLET, FILM COATED ORAL AT BEDTIME
Refills: 0 | Status: DISCONTINUED | OUTPATIENT
Start: 2024-06-13 | End: 2024-06-20

## 2024-06-13 RX ORDER — INSULIN REGULAR, HUMAN 100/ML
5 VIAL (ML) INJECTION ONCE
Refills: 0 | Status: COMPLETED | OUTPATIENT
Start: 2024-06-13 | End: 2024-06-13

## 2024-06-13 RX ORDER — DEXTROSE MONOHYDRATE AND SODIUM CHLORIDE 5; .3 G/100ML; G/100ML
1000 INJECTION, SOLUTION INTRAVENOUS
Refills: 0 | Status: DISCONTINUED | OUTPATIENT
Start: 2024-06-13 | End: 2024-06-20

## 2024-06-13 RX ORDER — POTASSIUM CHLORIDE 20 MEQ
2 PACKET (EA) ORAL
Refills: 0 | DISCHARGE

## 2024-06-13 RX ORDER — MULTIVIT-MIN/FERROUS GLUCONATE 9 MG/15 ML
1 LIQUID (ML) ORAL
Refills: 0 | DISCHARGE

## 2024-06-13 RX ORDER — ACETAMINOPHEN 325 MG
650 TABLET ORAL EVERY 6 HOURS
Refills: 0 | Status: DISCONTINUED | OUTPATIENT
Start: 2024-06-13 | End: 2024-06-20

## 2024-06-13 RX ORDER — DOXAZOSIN MESYLATE 4 MG
1 TABLET ORAL
Refills: 0 | DISCHARGE

## 2024-06-13 RX ORDER — DEXTROSE MONOHYDRATE 100 MG/ML
125 INJECTION, SOLUTION INTRAVENOUS ONCE
Refills: 0 | Status: DISCONTINUED | OUTPATIENT
Start: 2024-06-13 | End: 2024-06-20

## 2024-06-13 RX ORDER — LATANOPROST PF 0.05 MG/ML
1 SOLUTION/ DROPS OPHTHALMIC AT BEDTIME
Refills: 0 | Status: DISCONTINUED | OUTPATIENT
Start: 2024-06-13 | End: 2024-06-20

## 2024-06-13 RX ORDER — DEXTROSE 30 % IN WATER 30 %
15 VIAL (ML) INTRAVENOUS ONCE
Refills: 0 | Status: DISCONTINUED | OUTPATIENT
Start: 2024-06-13 | End: 2024-06-20

## 2024-06-13 RX ORDER — CLOPIDOGREL BISULFATE 75 MG/1
1 TABLET, FILM COATED ORAL
Refills: 0 | DISCHARGE

## 2024-06-13 RX ORDER — SODIUM CHLORIDE 0.9 % (FLUSH) 0.9 %
1000 SYRINGE (ML) INJECTION
Refills: 0 | Status: DISCONTINUED | OUTPATIENT
Start: 2024-06-13 | End: 2024-06-13

## 2024-06-13 RX ORDER — PANTOPRAZOLE SODIUM 40 MG/10ML
40 INJECTION, POWDER, FOR SOLUTION INTRAVENOUS EVERY 12 HOURS
Refills: 0 | Status: DISCONTINUED | OUTPATIENT
Start: 2024-06-13 | End: 2024-06-17

## 2024-06-13 RX ORDER — LATANOPROST 0.05 MG/ML
1 SOLUTION/ DROPS OPHTHALMIC; TOPICAL
Refills: 0 | DISCHARGE

## 2024-06-13 RX ADMIN — Medication 70 MILLILITER(S): at 16:05

## 2024-06-13 RX ADMIN — Medication 5 UNIT(S): at 01:34

## 2024-06-13 RX ADMIN — PROTHROMBIN COMPLEX CONCENTRATE (HUMAN) 400 INTERNATIONAL UNIT(S): 25.5; 16.5; 24; 22; 22; 26 POWDER, FOR SOLUTION INTRAVENOUS at 00:15

## 2024-06-13 RX ADMIN — Medication 25 MILLIGRAM(S): at 17:12

## 2024-06-13 RX ADMIN — Medication 25 MILLILITER(S): at 01:32

## 2024-06-13 RX ADMIN — INSULIN LISPRO 1: 100 INJECTION, SOLUTION SUBCUTANEOUS at 07:53

## 2024-06-13 RX ADMIN — Medication 25 MILLIGRAM(S): at 06:01

## 2024-06-13 RX ADMIN — Medication 500 MILLILITER(S): at 00:50

## 2024-06-13 RX ADMIN — INSULIN LISPRO 2: 100 INJECTION, SOLUTION SUBCUTANEOUS at 17:13

## 2024-06-13 RX ADMIN — PANTOPRAZOLE SODIUM 40 MILLIGRAM(S): 40 INJECTION, POWDER, FOR SOLUTION INTRAVENOUS at 17:13

## 2024-06-13 RX ADMIN — INSULIN LISPRO 1: 100 INJECTION, SOLUTION SUBCUTANEOUS at 12:44

## 2024-06-13 RX ADMIN — PANTOPRAZOLE SODIUM 40 MILLIGRAM(S): 40 INJECTION, POWDER, FOR SOLUTION INTRAVENOUS at 02:16

## 2024-06-13 RX ADMIN — LATANOPROST PF 1 DROP(S): 0.05 SOLUTION/ DROPS OPHTHALMIC at 21:57

## 2024-06-13 RX ADMIN — ATORVASTATIN CALCIUM 40 MILLIGRAM(S): 20 TABLET, FILM COATED ORAL at 21:57

## 2024-06-14 DIAGNOSIS — K62.89 OTHER SPECIFIED DISEASES OF ANUS AND RECTUM: ICD-10-CM

## 2024-06-14 LAB
ANION GAP SERPL CALC-SCNC: 11 MMOL/L — SIGNIFICANT CHANGE UP (ref 5–17)
BUN SERPL-MCNC: 116 MG/DL — HIGH (ref 7–23)
CALCIUM SERPL-MCNC: 9.2 MG/DL — SIGNIFICANT CHANGE UP (ref 8.4–10.5)
CHLORIDE SERPL-SCNC: 106 MMOL/L — SIGNIFICANT CHANGE UP (ref 96–108)
CO2 SERPL-SCNC: 22 MMOL/L — SIGNIFICANT CHANGE UP (ref 22–31)
CREAT SERPL-MCNC: 2.63 MG/DL — HIGH (ref 0.5–1.3)
EGFR: 21 ML/MIN/1.73M2 — LOW
GLUCOSE BLDC GLUCOMTR-MCNC: 162 MG/DL — HIGH (ref 70–99)
GLUCOSE BLDC GLUCOMTR-MCNC: 175 MG/DL — HIGH (ref 70–99)
GLUCOSE BLDC GLUCOMTR-MCNC: 177 MG/DL — HIGH (ref 70–99)
GLUCOSE BLDC GLUCOMTR-MCNC: 283 MG/DL — HIGH (ref 70–99)
GLUCOSE SERPL-MCNC: 171 MG/DL — HIGH (ref 70–99)
HCT VFR BLD CALC: 24.3 % — LOW (ref 39–50)
HCT VFR BLD CALC: 24.7 % — LOW (ref 39–50)
HGB BLD-MCNC: 7.9 G/DL — LOW (ref 13–17)
HGB BLD-MCNC: 8.2 G/DL — LOW (ref 13–17)
MCHC RBC-ENTMCNC: 25.2 PG — LOW (ref 27–34)
MCHC RBC-ENTMCNC: 25.7 PG — LOW (ref 27–34)
MCHC RBC-ENTMCNC: 32.5 GM/DL — SIGNIFICANT CHANGE UP (ref 32–36)
MCHC RBC-ENTMCNC: 33.2 GM/DL — SIGNIFICANT CHANGE UP (ref 32–36)
MCV RBC AUTO: 77.4 FL — LOW (ref 80–100)
MCV RBC AUTO: 77.6 FL — LOW (ref 80–100)
NRBC # BLD: 0 /100 WBCS — SIGNIFICANT CHANGE UP (ref 0–0)
NRBC # BLD: 0 /100 WBCS — SIGNIFICANT CHANGE UP (ref 0–0)
PLATELET # BLD AUTO: 200 K/UL — SIGNIFICANT CHANGE UP (ref 150–400)
PLATELET # BLD AUTO: 213 K/UL — SIGNIFICANT CHANGE UP (ref 150–400)
POTASSIUM SERPL-MCNC: 4.7 MMOL/L — SIGNIFICANT CHANGE UP (ref 3.5–5.3)
POTASSIUM SERPL-SCNC: 4.7 MMOL/L — SIGNIFICANT CHANGE UP (ref 3.5–5.3)
PROT SERPL-MCNC: 6.3 G/DL — SIGNIFICANT CHANGE UP (ref 6–8.3)
RBC # BLD: 3.13 M/UL — LOW (ref 4.2–5.8)
RBC # BLD: 3.19 M/UL — LOW (ref 4.2–5.8)
RBC # FLD: 18.7 % — HIGH (ref 10.3–14.5)
RBC # FLD: 18.9 % — HIGH (ref 10.3–14.5)
SODIUM SERPL-SCNC: 139 MMOL/L — SIGNIFICANT CHANGE UP (ref 135–145)
WBC # BLD: 7.83 K/UL — SIGNIFICANT CHANGE UP (ref 3.8–10.5)
WBC # BLD: 7.92 K/UL — SIGNIFICANT CHANGE UP (ref 3.8–10.5)
WBC # FLD AUTO: 7.83 K/UL — SIGNIFICANT CHANGE UP (ref 3.8–10.5)
WBC # FLD AUTO: 7.92 K/UL — SIGNIFICANT CHANGE UP (ref 3.8–10.5)

## 2024-06-14 PROCEDURE — 99233 SBSQ HOSP IP/OBS HIGH 50: CPT

## 2024-06-14 PROCEDURE — 99232 SBSQ HOSP IP/OBS MODERATE 35: CPT

## 2024-06-14 PROCEDURE — 74176 CT ABD & PELVIS W/O CONTRAST: CPT | Mod: 26

## 2024-06-14 RX ORDER — CIPROFLOXACIN HCL 500 MG
200 TABLET ORAL EVERY 24 HOURS
Refills: 0 | Status: DISCONTINUED | OUTPATIENT
Start: 2024-06-14 | End: 2024-06-17

## 2024-06-14 RX ORDER — SACCHAROMYCES BOULARDII 50 MG
250 CAPSULE ORAL
Refills: 0 | Status: DISCONTINUED | OUTPATIENT
Start: 2024-06-14 | End: 2024-06-20

## 2024-06-14 RX ORDER — INSULIN LISPRO 100 [IU]/ML
2 INJECTION, SOLUTION SUBCUTANEOUS
Refills: 0 | Status: DISCONTINUED | OUTPATIENT
Start: 2024-06-14 | End: 2024-06-15

## 2024-06-14 RX ORDER — DEXTROSE MONOHYDRATE AND SODIUM CHLORIDE 5; .3 G/100ML; G/100ML
1000 INJECTION, SOLUTION INTRAVENOUS
Refills: 0 | Status: DISCONTINUED | OUTPATIENT
Start: 2024-06-14 | End: 2024-06-14

## 2024-06-14 RX ORDER — METRONIDAZOLE 500 MG/1
500 TABLET ORAL EVERY 8 HOURS
Refills: 0 | Status: DISCONTINUED | OUTPATIENT
Start: 2024-06-14 | End: 2024-06-17

## 2024-06-14 RX ORDER — INSULIN GLARGINE 100 [IU]/ML
10 INJECTION, SOLUTION SUBCUTANEOUS AT BEDTIME
Refills: 0 | Status: DISCONTINUED | OUTPATIENT
Start: 2024-06-14 | End: 2024-06-20

## 2024-06-14 RX ADMIN — Medication 25 MILLIGRAM(S): at 17:14

## 2024-06-14 RX ADMIN — PANTOPRAZOLE SODIUM 40 MILLIGRAM(S): 40 INJECTION, POWDER, FOR SOLUTION INTRAVENOUS at 06:09

## 2024-06-14 RX ADMIN — INSULIN LISPRO 1: 100 INJECTION, SOLUTION SUBCUTANEOUS at 08:24

## 2024-06-14 RX ADMIN — INSULIN LISPRO 1: 100 INJECTION, SOLUTION SUBCUTANEOUS at 12:48

## 2024-06-14 RX ADMIN — INSULIN GLARGINE 10 UNIT(S): 100 INJECTION, SOLUTION SUBCUTANEOUS at 21:13

## 2024-06-14 RX ADMIN — INSULIN LISPRO 2 UNIT(S): 100 INJECTION, SOLUTION SUBCUTANEOUS at 17:13

## 2024-06-14 RX ADMIN — LATANOPROST PF 1 DROP(S): 0.05 SOLUTION/ DROPS OPHTHALMIC at 22:09

## 2024-06-14 RX ADMIN — ATORVASTATIN CALCIUM 40 MILLIGRAM(S): 20 TABLET, FILM COATED ORAL at 21:03

## 2024-06-14 RX ADMIN — Medication 250 MILLIGRAM(S): at 17:15

## 2024-06-14 RX ADMIN — INSULIN LISPRO 3: 100 INJECTION, SOLUTION SUBCUTANEOUS at 17:13

## 2024-06-14 RX ADMIN — PANTOPRAZOLE SODIUM 40 MILLIGRAM(S): 40 INJECTION, POWDER, FOR SOLUTION INTRAVENOUS at 17:14

## 2024-06-14 RX ADMIN — DEXTROSE MONOHYDRATE AND SODIUM CHLORIDE 50 MILLILITER(S): 5; .3 INJECTION, SOLUTION INTRAVENOUS at 12:48

## 2024-06-14 RX ADMIN — Medication 25 MILLIGRAM(S): at 06:08

## 2024-06-14 RX ADMIN — Medication 200 MILLIGRAM(S): at 17:15

## 2024-06-14 RX ADMIN — METRONIDAZOLE 100 MILLIGRAM(S): 500 TABLET ORAL at 21:15

## 2024-06-15 LAB
% ALBUMIN: 50 % — SIGNIFICANT CHANGE UP
% ALPHA 1: 6.6 % — SIGNIFICANT CHANGE UP
% ALPHA 2: 9.7 % — SIGNIFICANT CHANGE UP
% BETA: 11.4 % — SIGNIFICANT CHANGE UP
% GAMMA: 22.3 % — SIGNIFICANT CHANGE UP
% M SPIKE: SIGNIFICANT CHANGE UP
ALBUMIN SERPL ELPH-MCNC: 3.2 G/DL — LOW (ref 3.6–5.5)
ALBUMIN/GLOB SERPL ELPH: 1 RATIO — SIGNIFICANT CHANGE UP
ALPHA1 GLOB SERPL ELPH-MCNC: 0.4 G/DL — SIGNIFICANT CHANGE UP (ref 0.1–0.4)
ALPHA2 GLOB SERPL ELPH-MCNC: 0.6 G/DL — SIGNIFICANT CHANGE UP (ref 0.5–1)
ANION GAP SERPL CALC-SCNC: 9 MMOL/L — SIGNIFICANT CHANGE UP (ref 5–17)
B-GLOBULIN SERPL ELPH-MCNC: 0.7 G/DL — SIGNIFICANT CHANGE UP (ref 0.5–1)
BUN SERPL-MCNC: 106 MG/DL — HIGH (ref 7–23)
CALCIUM SERPL-MCNC: 8.9 MG/DL — SIGNIFICANT CHANGE UP (ref 8.4–10.5)
CHLORIDE SERPL-SCNC: 108 MMOL/L — SIGNIFICANT CHANGE UP (ref 96–108)
CO2 SERPL-SCNC: 23 MMOL/L — SIGNIFICANT CHANGE UP (ref 22–31)
CREAT SERPL-MCNC: 2.43 MG/DL — HIGH (ref 0.5–1.3)
EGFR: 23 ML/MIN/1.73M2 — LOW
FERRITIN SERPL-MCNC: 35 NG/ML — SIGNIFICANT CHANGE UP (ref 30–400)
FOLATE SERPL-MCNC: 18.7 NG/ML — SIGNIFICANT CHANGE UP
GAMMA GLOBULIN: 1.4 G/DL — SIGNIFICANT CHANGE UP (ref 0.6–1.6)
GLUCOSE BLDC GLUCOMTR-MCNC: 163 MG/DL — HIGH (ref 70–99)
GLUCOSE BLDC GLUCOMTR-MCNC: 169 MG/DL — HIGH (ref 70–99)
GLUCOSE BLDC GLUCOMTR-MCNC: 199 MG/DL — HIGH (ref 70–99)
GLUCOSE BLDC GLUCOMTR-MCNC: 229 MG/DL — HIGH (ref 70–99)
GLUCOSE SERPL-MCNC: 147 MG/DL — HIGH (ref 70–99)
HCT VFR BLD CALC: 22.3 % — LOW (ref 39–50)
HGB BLD-MCNC: 7.2 G/DL — LOW (ref 13–17)
INTERPRETATION SERPL IFE-IMP: SIGNIFICANT CHANGE UP
IRON SATN MFR SERPL: 18 UG/DL — LOW (ref 45–165)
IRON SATN MFR SERPL: 5 % — LOW (ref 16–55)
M-SPIKE: SIGNIFICANT CHANGE UP (ref 0–0)
MCHC RBC-ENTMCNC: 25.4 PG — LOW (ref 27–34)
MCHC RBC-ENTMCNC: 32.3 GM/DL — SIGNIFICANT CHANGE UP (ref 32–36)
MCV RBC AUTO: 78.5 FL — LOW (ref 80–100)
NRBC # BLD: 0 /100 WBCS — SIGNIFICANT CHANGE UP (ref 0–0)
PLATELET # BLD AUTO: 191 K/UL — SIGNIFICANT CHANGE UP (ref 150–400)
POTASSIUM SERPL-MCNC: 4.3 MMOL/L — SIGNIFICANT CHANGE UP (ref 3.5–5.3)
POTASSIUM SERPL-SCNC: 4.3 MMOL/L — SIGNIFICANT CHANGE UP (ref 3.5–5.3)
PROT PATTERN SERPL ELPH-IMP: SIGNIFICANT CHANGE UP
PROT SERPL-MCNC: 6.3 G/DL — SIGNIFICANT CHANGE UP (ref 6–8.3)
RBC # BLD: 2.84 M/UL — LOW (ref 4.2–5.8)
RBC # FLD: 19.5 % — HIGH (ref 10.3–14.5)
SODIUM SERPL-SCNC: 140 MMOL/L — SIGNIFICANT CHANGE UP (ref 135–145)
TIBC SERPL-MCNC: 336 UG/DL — SIGNIFICANT CHANGE UP (ref 220–430)
UIBC SERPL-MCNC: 318 UG/DL — SIGNIFICANT CHANGE UP (ref 110–370)
VIT B12 SERPL-MCNC: 1281 PG/ML — HIGH (ref 232–1245)
WBC # BLD: 6.87 K/UL — SIGNIFICANT CHANGE UP (ref 3.8–10.5)
WBC # FLD AUTO: 6.87 K/UL — SIGNIFICANT CHANGE UP (ref 3.8–10.5)

## 2024-06-15 PROCEDURE — 99232 SBSQ HOSP IP/OBS MODERATE 35: CPT

## 2024-06-15 PROCEDURE — 99233 SBSQ HOSP IP/OBS HIGH 50: CPT

## 2024-06-15 RX ORDER — INSULIN LISPRO 100 [IU]/ML
3 INJECTION, SOLUTION SUBCUTANEOUS
Refills: 0 | Status: DISCONTINUED | OUTPATIENT
Start: 2024-06-15 | End: 2024-06-20

## 2024-06-15 RX ADMIN — INSULIN LISPRO 2: 100 INJECTION, SOLUTION SUBCUTANEOUS at 12:42

## 2024-06-15 RX ADMIN — PANTOPRAZOLE SODIUM 40 MILLIGRAM(S): 40 INJECTION, POWDER, FOR SOLUTION INTRAVENOUS at 05:43

## 2024-06-15 RX ADMIN — METRONIDAZOLE 100 MILLIGRAM(S): 500 TABLET ORAL at 13:40

## 2024-06-15 RX ADMIN — INSULIN LISPRO 2 UNIT(S): 100 INJECTION, SOLUTION SUBCUTANEOUS at 08:59

## 2024-06-15 RX ADMIN — INSULIN LISPRO 1: 100 INJECTION, SOLUTION SUBCUTANEOUS at 08:58

## 2024-06-15 RX ADMIN — Medication 250 MILLIGRAM(S): at 17:26

## 2024-06-15 RX ADMIN — Medication 250 MILLIGRAM(S): at 05:45

## 2024-06-15 RX ADMIN — Medication 25 MILLIGRAM(S): at 17:26

## 2024-06-15 RX ADMIN — PANTOPRAZOLE SODIUM 40 MILLIGRAM(S): 40 INJECTION, POWDER, FOR SOLUTION INTRAVENOUS at 17:26

## 2024-06-15 RX ADMIN — INSULIN LISPRO 1: 100 INJECTION, SOLUTION SUBCUTANEOUS at 17:25

## 2024-06-15 RX ADMIN — INSULIN LISPRO 2 UNIT(S): 100 INJECTION, SOLUTION SUBCUTANEOUS at 12:43

## 2024-06-15 RX ADMIN — Medication 25 MILLIGRAM(S): at 05:45

## 2024-06-15 RX ADMIN — INSULIN LISPRO 3 UNIT(S): 100 INJECTION, SOLUTION SUBCUTANEOUS at 17:26

## 2024-06-15 RX ADMIN — Medication 200 MILLIGRAM(S): at 17:26

## 2024-06-15 RX ADMIN — METRONIDAZOLE 100 MILLIGRAM(S): 500 TABLET ORAL at 05:42

## 2024-06-16 LAB
ANION GAP SERPL CALC-SCNC: 8 MMOL/L — SIGNIFICANT CHANGE UP (ref 5–17)
BLD GP AB SCN SERPL QL: SIGNIFICANT CHANGE UP
BUN SERPL-MCNC: 90 MG/DL — HIGH (ref 7–23)
CALCIUM SERPL-MCNC: 8.6 MG/DL — SIGNIFICANT CHANGE UP (ref 8.4–10.5)
CHLORIDE SERPL-SCNC: 106 MMOL/L — SIGNIFICANT CHANGE UP (ref 96–108)
CO2 SERPL-SCNC: 24 MMOL/L — SIGNIFICANT CHANGE UP (ref 22–31)
CREAT SERPL-MCNC: 2.24 MG/DL — HIGH (ref 0.5–1.3)
EGFR: 26 ML/MIN/1.73M2 — LOW
GLUCOSE BLDC GLUCOMTR-MCNC: 141 MG/DL — HIGH (ref 70–99)
GLUCOSE BLDC GLUCOMTR-MCNC: 170 MG/DL — HIGH (ref 70–99)
GLUCOSE BLDC GLUCOMTR-MCNC: 190 MG/DL — HIGH (ref 70–99)
GLUCOSE BLDC GLUCOMTR-MCNC: 219 MG/DL — HIGH (ref 70–99)
GLUCOSE SERPL-MCNC: 151 MG/DL — HIGH (ref 70–99)
HCT VFR BLD CALC: 26 % — LOW (ref 39–50)
HGB BLD-MCNC: 8.5 G/DL — LOW (ref 13–17)
MCHC RBC-ENTMCNC: 25.8 PG — LOW (ref 27–34)
MCHC RBC-ENTMCNC: 32.7 GM/DL — SIGNIFICANT CHANGE UP (ref 32–36)
MCV RBC AUTO: 78.8 FL — LOW (ref 80–100)
NRBC # BLD: 0 /100 WBCS — SIGNIFICANT CHANGE UP (ref 0–0)
PLATELET # BLD AUTO: 199 K/UL — SIGNIFICANT CHANGE UP (ref 150–400)
POTASSIUM SERPL-MCNC: 4.8 MMOL/L — SIGNIFICANT CHANGE UP (ref 3.5–5.3)
POTASSIUM SERPL-SCNC: 4.8 MMOL/L — SIGNIFICANT CHANGE UP (ref 3.5–5.3)
RBC # BLD: 3.3 M/UL — LOW (ref 4.2–5.8)
RBC # FLD: 19.9 % — HIGH (ref 10.3–14.5)
SODIUM SERPL-SCNC: 138 MMOL/L — SIGNIFICANT CHANGE UP (ref 135–145)
WBC # BLD: 6.95 K/UL — SIGNIFICANT CHANGE UP (ref 3.8–10.5)
WBC # FLD AUTO: 6.95 K/UL — SIGNIFICANT CHANGE UP (ref 3.8–10.5)

## 2024-06-16 PROCEDURE — 99233 SBSQ HOSP IP/OBS HIGH 50: CPT

## 2024-06-16 PROCEDURE — 99232 SBSQ HOSP IP/OBS MODERATE 35: CPT

## 2024-06-16 RX ORDER — IRON SUCROSE 20 MG/ML
100 INJECTION, SOLUTION INTRAVENOUS EVERY 24 HOURS
Refills: 0 | Status: COMPLETED | OUTPATIENT
Start: 2024-06-16 | End: 2024-06-18

## 2024-06-16 RX ADMIN — Medication 25 MILLIGRAM(S): at 17:29

## 2024-06-16 RX ADMIN — INSULIN LISPRO 3 UNIT(S): 100 INJECTION, SOLUTION SUBCUTANEOUS at 08:52

## 2024-06-16 RX ADMIN — Medication 650 MILLIGRAM(S): at 10:20

## 2024-06-16 RX ADMIN — METRONIDAZOLE 100 MILLIGRAM(S): 500 TABLET ORAL at 21:44

## 2024-06-16 RX ADMIN — INSULIN LISPRO 3 UNIT(S): 100 INJECTION, SOLUTION SUBCUTANEOUS at 12:54

## 2024-06-16 RX ADMIN — IRON SUCROSE 210 MILLIGRAM(S): 20 INJECTION, SOLUTION INTRAVENOUS at 10:38

## 2024-06-16 RX ADMIN — Medication 200 MILLIGRAM(S): at 17:29

## 2024-06-16 RX ADMIN — INSULIN GLARGINE 10 UNIT(S): 100 INJECTION, SOLUTION SUBCUTANEOUS at 21:44

## 2024-06-16 RX ADMIN — Medication 250 MILLIGRAM(S): at 17:29

## 2024-06-16 RX ADMIN — LATANOPROST PF 1 DROP(S): 0.05 SOLUTION/ DROPS OPHTHALMIC at 21:44

## 2024-06-16 RX ADMIN — INSULIN LISPRO 1: 100 INJECTION, SOLUTION SUBCUTANEOUS at 17:30

## 2024-06-16 RX ADMIN — PANTOPRAZOLE SODIUM 40 MILLIGRAM(S): 40 INJECTION, POWDER, FOR SOLUTION INTRAVENOUS at 17:29

## 2024-06-16 RX ADMIN — Medication 650 MILLIGRAM(S): at 11:15

## 2024-06-16 RX ADMIN — Medication 250 MILLIGRAM(S): at 05:47

## 2024-06-16 RX ADMIN — METRONIDAZOLE 100 MILLIGRAM(S): 500 TABLET ORAL at 13:25

## 2024-06-16 RX ADMIN — INSULIN LISPRO 2: 100 INJECTION, SOLUTION SUBCUTANEOUS at 12:53

## 2024-06-16 RX ADMIN — PANTOPRAZOLE SODIUM 40 MILLIGRAM(S): 40 INJECTION, POWDER, FOR SOLUTION INTRAVENOUS at 05:47

## 2024-06-16 RX ADMIN — INSULIN LISPRO 3 UNIT(S): 100 INJECTION, SOLUTION SUBCUTANEOUS at 17:30

## 2024-06-16 RX ADMIN — METRONIDAZOLE 100 MILLIGRAM(S): 500 TABLET ORAL at 05:49

## 2024-06-16 RX ADMIN — Medication 25 MILLIGRAM(S): at 05:47

## 2024-06-16 RX ADMIN — ATORVASTATIN CALCIUM 40 MILLIGRAM(S): 20 TABLET, FILM COATED ORAL at 21:44

## 2024-06-17 ENCOUNTER — TRANSCRIPTION ENCOUNTER (OUTPATIENT)
Age: 89
End: 2024-06-17

## 2024-06-17 ENCOUNTER — RESULT REVIEW (OUTPATIENT)
Age: 89
End: 2024-06-17

## 2024-06-17 LAB
ANION GAP SERPL CALC-SCNC: 10 MMOL/L — SIGNIFICANT CHANGE UP (ref 5–17)
BUN SERPL-MCNC: 78 MG/DL — HIGH (ref 7–23)
CALCIUM SERPL-MCNC: 8.6 MG/DL — SIGNIFICANT CHANGE UP (ref 8.4–10.5)
CHLORIDE SERPL-SCNC: 105 MMOL/L — SIGNIFICANT CHANGE UP (ref 96–108)
CO2 SERPL-SCNC: 23 MMOL/L — SIGNIFICANT CHANGE UP (ref 22–31)
CREAT SERPL-MCNC: 2.08 MG/DL — HIGH (ref 0.5–1.3)
EGFR: 28 ML/MIN/1.73M2 — LOW
GLUCOSE BLDC GLUCOMTR-MCNC: 100 MG/DL — HIGH (ref 70–99)
GLUCOSE BLDC GLUCOMTR-MCNC: 122 MG/DL — HIGH (ref 70–99)
GLUCOSE BLDC GLUCOMTR-MCNC: 126 MG/DL — HIGH (ref 70–99)
GLUCOSE BLDC GLUCOMTR-MCNC: 174 MG/DL — HIGH (ref 70–99)
GLUCOSE BLDC GLUCOMTR-MCNC: 195 MG/DL — HIGH (ref 70–99)
GLUCOSE SERPL-MCNC: 127 MG/DL — HIGH (ref 70–99)
HCT VFR BLD CALC: 28 % — LOW (ref 39–50)
HGB BLD-MCNC: 9 G/DL — LOW (ref 13–17)
MCHC RBC-ENTMCNC: 25.7 PG — LOW (ref 27–34)
MCHC RBC-ENTMCNC: 32.1 GM/DL — SIGNIFICANT CHANGE UP (ref 32–36)
MCV RBC AUTO: 80 FL — SIGNIFICANT CHANGE UP (ref 80–100)
NRBC # BLD: 0 /100 WBCS — SIGNIFICANT CHANGE UP (ref 0–0)
PLATELET # BLD AUTO: 226 K/UL — SIGNIFICANT CHANGE UP (ref 150–400)
POTASSIUM SERPL-MCNC: 4.3 MMOL/L — SIGNIFICANT CHANGE UP (ref 3.5–5.3)
POTASSIUM SERPL-SCNC: 4.3 MMOL/L — SIGNIFICANT CHANGE UP (ref 3.5–5.3)
RBC # BLD: 3.5 M/UL — LOW (ref 4.2–5.8)
RBC # FLD: 20.8 % — HIGH (ref 10.3–14.5)
SODIUM SERPL-SCNC: 138 MMOL/L — SIGNIFICANT CHANGE UP (ref 135–145)
WBC # BLD: 7.74 K/UL — SIGNIFICANT CHANGE UP (ref 3.8–10.5)
WBC # FLD AUTO: 7.74 K/UL — SIGNIFICANT CHANGE UP (ref 3.8–10.5)

## 2024-06-17 PROCEDURE — 88305 TISSUE EXAM BY PATHOLOGIST: CPT | Mod: 26

## 2024-06-17 PROCEDURE — 99232 SBSQ HOSP IP/OBS MODERATE 35: CPT

## 2024-06-17 PROCEDURE — 88312 SPECIAL STAINS GROUP 1: CPT | Mod: 26

## 2024-06-17 PROCEDURE — 99233 SBSQ HOSP IP/OBS HIGH 50: CPT | Mod: 25

## 2024-06-17 PROCEDURE — 43239 EGD BIOPSY SINGLE/MULTIPLE: CPT | Mod: 59

## 2024-06-17 RX ORDER — METRONIDAZOLE 500 MG/1
500 TABLET ORAL THREE TIMES A DAY
Refills: 0 | Status: DISCONTINUED | OUTPATIENT
Start: 2024-06-17 | End: 2024-06-20

## 2024-06-17 RX ORDER — SODIUM CHLORIDE 0.9 % (FLUSH) 0.9 %
1000 SYRINGE (ML) INJECTION
Refills: 0 | Status: DISCONTINUED | OUTPATIENT
Start: 2024-06-17 | End: 2024-06-17

## 2024-06-17 RX ORDER — ASPIRIN 325 MG/1
81 TABLET, FILM COATED ORAL DAILY
Refills: 0 | Status: DISCONTINUED | OUTPATIENT
Start: 2024-06-17 | End: 2024-06-17

## 2024-06-17 RX ORDER — CIPROFLOXACIN HCL 500 MG
250 TABLET ORAL
Refills: 0 | Status: DISCONTINUED | OUTPATIENT
Start: 2024-06-17 | End: 2024-06-19

## 2024-06-17 RX ORDER — CLOPIDOGREL BISULFATE 75 MG/1
75 TABLET, FILM COATED ORAL DAILY
Refills: 0 | Status: DISCONTINUED | OUTPATIENT
Start: 2024-06-17 | End: 2024-06-20

## 2024-06-17 RX ORDER — PANTOPRAZOLE SODIUM 40 MG/10ML
40 INJECTION, POWDER, FOR SOLUTION INTRAVENOUS
Refills: 0 | Status: DISCONTINUED | OUTPATIENT
Start: 2024-06-17 | End: 2024-06-20

## 2024-06-17 RX ORDER — APIXABAN 5 MG/1
2.5 TABLET, FILM COATED ORAL
Refills: 0 | Status: DISCONTINUED | OUTPATIENT
Start: 2024-06-17 | End: 2024-06-20

## 2024-06-17 RX ADMIN — Medication 650 MILLIGRAM(S): at 16:01

## 2024-06-17 RX ADMIN — CLOPIDOGREL BISULFATE 75 MILLIGRAM(S): 75 TABLET, FILM COATED ORAL at 17:42

## 2024-06-17 RX ADMIN — Medication 250 MILLIGRAM(S): at 05:26

## 2024-06-17 RX ADMIN — Medication 250 MILLIGRAM(S): at 18:05

## 2024-06-17 RX ADMIN — INSULIN LISPRO 3 UNIT(S): 100 INJECTION, SOLUTION SUBCUTANEOUS at 17:41

## 2024-06-17 RX ADMIN — INSULIN GLARGINE 10 UNIT(S): 100 INJECTION, SOLUTION SUBCUTANEOUS at 21:50

## 2024-06-17 RX ADMIN — Medication 50 MILLILITER(S): at 08:25

## 2024-06-17 RX ADMIN — APIXABAN 2.5 MILLIGRAM(S): 5 TABLET, FILM COATED ORAL at 17:42

## 2024-06-17 RX ADMIN — PANTOPRAZOLE SODIUM 40 MILLIGRAM(S): 40 INJECTION, POWDER, FOR SOLUTION INTRAVENOUS at 18:05

## 2024-06-17 RX ADMIN — Medication 250 MILLIGRAM(S): at 17:42

## 2024-06-17 RX ADMIN — Medication 25 MILLIGRAM(S): at 05:27

## 2024-06-17 RX ADMIN — Medication 650 MILLIGRAM(S): at 15:31

## 2024-06-17 RX ADMIN — PANTOPRAZOLE SODIUM 40 MILLIGRAM(S): 40 INJECTION, POWDER, FOR SOLUTION INTRAVENOUS at 05:26

## 2024-06-17 RX ADMIN — METRONIDAZOLE 100 MILLIGRAM(S): 500 TABLET ORAL at 05:24

## 2024-06-17 RX ADMIN — LATANOPROST PF 1 DROP(S): 0.05 SOLUTION/ DROPS OPHTHALMIC at 21:52

## 2024-06-17 RX ADMIN — INSULIN LISPRO 1: 100 INJECTION, SOLUTION SUBCUTANEOUS at 17:41

## 2024-06-17 RX ADMIN — METRONIDAZOLE 500 MILLIGRAM(S): 500 TABLET ORAL at 13:46

## 2024-06-17 RX ADMIN — ATORVASTATIN CALCIUM 40 MILLIGRAM(S): 20 TABLET, FILM COATED ORAL at 21:50

## 2024-06-17 RX ADMIN — Medication 25 MILLIGRAM(S): at 17:42

## 2024-06-17 RX ADMIN — METRONIDAZOLE 500 MILLIGRAM(S): 500 TABLET ORAL at 21:50

## 2024-06-17 RX ADMIN — IRON SUCROSE 210 MILLIGRAM(S): 20 INJECTION, SOLUTION INTRAVENOUS at 13:46

## 2024-06-18 DIAGNOSIS — I50.22 CHRONIC SYSTOLIC (CONGESTIVE) HEART FAILURE: ICD-10-CM

## 2024-06-18 LAB
ANION GAP SERPL CALC-SCNC: 8 MMOL/L — SIGNIFICANT CHANGE UP (ref 5–17)
BUN SERPL-MCNC: 73 MG/DL — HIGH (ref 7–23)
CALCIUM SERPL-MCNC: 8.4 MG/DL — SIGNIFICANT CHANGE UP (ref 8.4–10.5)
CHLORIDE SERPL-SCNC: 105 MMOL/L — SIGNIFICANT CHANGE UP (ref 96–108)
CO2 SERPL-SCNC: 22 MMOL/L — SIGNIFICANT CHANGE UP (ref 22–31)
CREAT SERPL-MCNC: 2.23 MG/DL — HIGH (ref 0.5–1.3)
EGFR: 26 ML/MIN/1.73M2 — LOW
FERRITIN SERPL-MCNC: 160 NG/ML — SIGNIFICANT CHANGE UP (ref 30–400)
GLUCOSE BLDC GLUCOMTR-MCNC: 106 MG/DL — HIGH (ref 70–99)
GLUCOSE BLDC GLUCOMTR-MCNC: 142 MG/DL — HIGH (ref 70–99)
GLUCOSE BLDC GLUCOMTR-MCNC: 158 MG/DL — HIGH (ref 70–99)
GLUCOSE BLDC GLUCOMTR-MCNC: 173 MG/DL — HIGH (ref 70–99)
GLUCOSE SERPL-MCNC: 132 MG/DL — HIGH (ref 70–99)
HAPTOGLOB SERPL-MCNC: 116 MG/DL — SIGNIFICANT CHANGE UP (ref 34–200)
HCT VFR BLD CALC: 27 % — LOW (ref 39–50)
HGB BLD-MCNC: 8.8 G/DL — LOW (ref 13–17)
IRON SATN MFR SERPL: 20 % — SIGNIFICANT CHANGE UP (ref 16–55)
IRON SATN MFR SERPL: 65 UG/DL — SIGNIFICANT CHANGE UP (ref 45–165)
MCHC RBC-ENTMCNC: 25.7 PG — LOW (ref 27–34)
MCHC RBC-ENTMCNC: 32.6 GM/DL — SIGNIFICANT CHANGE UP (ref 32–36)
MCV RBC AUTO: 78.7 FL — LOW (ref 80–100)
NRBC # BLD: 0 /100 WBCS — SIGNIFICANT CHANGE UP (ref 0–0)
PLATELET # BLD AUTO: 240 K/UL — SIGNIFICANT CHANGE UP (ref 150–400)
POTASSIUM SERPL-MCNC: 4.4 MMOL/L — SIGNIFICANT CHANGE UP (ref 3.5–5.3)
POTASSIUM SERPL-SCNC: 4.4 MMOL/L — SIGNIFICANT CHANGE UP (ref 3.5–5.3)
PROT ?TM UR-MCNC: 21 MG/DL — HIGH (ref 0–12)
RBC # BLD: 3.43 M/UL — LOW (ref 4.2–5.8)
RBC # FLD: 21.2 % — HIGH (ref 10.3–14.5)
SODIUM SERPL-SCNC: 135 MMOL/L — SIGNIFICANT CHANGE UP (ref 135–145)
TIBC SERPL-MCNC: 329 UG/DL — SIGNIFICANT CHANGE UP (ref 220–430)
UIBC SERPL-MCNC: 264 UG/DL — SIGNIFICANT CHANGE UP (ref 110–370)
WBC # BLD: 7.34 K/UL — SIGNIFICANT CHANGE UP (ref 3.8–10.5)
WBC # FLD AUTO: 7.34 K/UL — SIGNIFICANT CHANGE UP (ref 3.8–10.5)

## 2024-06-18 PROCEDURE — 99232 SBSQ HOSP IP/OBS MODERATE 35: CPT

## 2024-06-18 RX ORDER — FERROUS SULFATE 325(65) MG
325 TABLET ORAL DAILY
Refills: 0 | Status: DISCONTINUED | OUTPATIENT
Start: 2024-06-18 | End: 2024-06-20

## 2024-06-18 RX ORDER — FERROUS SULFATE 325(65) MG
1 TABLET ORAL
Qty: 30 | Refills: 0
Start: 2024-06-18 | End: 2024-07-17

## 2024-06-18 RX ORDER — APIXABAN 5 MG/1
1 TABLET, FILM COATED ORAL
Qty: 60 | Refills: 0
Start: 2024-06-18 | End: 2024-07-17

## 2024-06-18 RX ADMIN — Medication 25 MILLIGRAM(S): at 17:24

## 2024-06-18 RX ADMIN — INSULIN LISPRO 3 UNIT(S): 100 INJECTION, SOLUTION SUBCUTANEOUS at 12:50

## 2024-06-18 RX ADMIN — Medication 250 MILLIGRAM(S): at 17:24

## 2024-06-18 RX ADMIN — LATANOPROST PF 1 DROP(S): 0.05 SOLUTION/ DROPS OPHTHALMIC at 21:39

## 2024-06-18 RX ADMIN — PANTOPRAZOLE SODIUM 40 MILLIGRAM(S): 40 INJECTION, POWDER, FOR SOLUTION INTRAVENOUS at 17:24

## 2024-06-18 RX ADMIN — INSULIN LISPRO 1: 100 INJECTION, SOLUTION SUBCUTANEOUS at 12:50

## 2024-06-18 RX ADMIN — INSULIN LISPRO 1: 100 INJECTION, SOLUTION SUBCUTANEOUS at 17:24

## 2024-06-18 RX ADMIN — INSULIN LISPRO 3 UNIT(S): 100 INJECTION, SOLUTION SUBCUTANEOUS at 17:25

## 2024-06-18 RX ADMIN — INSULIN LISPRO 3 UNIT(S): 100 INJECTION, SOLUTION SUBCUTANEOUS at 08:43

## 2024-06-18 RX ADMIN — APIXABAN 2.5 MILLIGRAM(S): 5 TABLET, FILM COATED ORAL at 05:53

## 2024-06-18 RX ADMIN — ATORVASTATIN CALCIUM 40 MILLIGRAM(S): 20 TABLET, FILM COATED ORAL at 21:39

## 2024-06-18 RX ADMIN — METRONIDAZOLE 500 MILLIGRAM(S): 500 TABLET ORAL at 05:53

## 2024-06-18 RX ADMIN — Medication 325 MILLIGRAM(S): at 13:52

## 2024-06-18 RX ADMIN — INSULIN GLARGINE 10 UNIT(S): 100 INJECTION, SOLUTION SUBCUTANEOUS at 21:40

## 2024-06-18 RX ADMIN — METRONIDAZOLE 500 MILLIGRAM(S): 500 TABLET ORAL at 13:16

## 2024-06-18 RX ADMIN — Medication 25 MILLIGRAM(S): at 05:53

## 2024-06-18 RX ADMIN — Medication 250 MILLIGRAM(S): at 05:53

## 2024-06-18 RX ADMIN — METRONIDAZOLE 500 MILLIGRAM(S): 500 TABLET ORAL at 21:39

## 2024-06-18 RX ADMIN — PANTOPRAZOLE SODIUM 40 MILLIGRAM(S): 40 INJECTION, POWDER, FOR SOLUTION INTRAVENOUS at 05:53

## 2024-06-18 RX ADMIN — APIXABAN 2.5 MILLIGRAM(S): 5 TABLET, FILM COATED ORAL at 17:24

## 2024-06-18 RX ADMIN — CLOPIDOGREL BISULFATE 75 MILLIGRAM(S): 75 TABLET, FILM COATED ORAL at 11:16

## 2024-06-19 LAB
ANION GAP SERPL CALC-SCNC: 12 MMOL/L — SIGNIFICANT CHANGE UP (ref 5–17)
BUN SERPL-MCNC: 65 MG/DL — HIGH (ref 7–23)
CALCIUM SERPL-MCNC: 8.3 MG/DL — LOW (ref 8.4–10.5)
CHLORIDE SERPL-SCNC: 104 MMOL/L — SIGNIFICANT CHANGE UP (ref 96–108)
CO2 SERPL-SCNC: 20 MMOL/L — LOW (ref 22–31)
CREAT SERPL-MCNC: 2.07 MG/DL — HIGH (ref 0.5–1.3)
EGFR: 28 ML/MIN/1.73M2 — LOW
GLUCOSE BLDC GLUCOMTR-MCNC: 110 MG/DL — HIGH (ref 70–99)
GLUCOSE BLDC GLUCOMTR-MCNC: 145 MG/DL — HIGH (ref 70–99)
GLUCOSE BLDC GLUCOMTR-MCNC: 155 MG/DL — HIGH (ref 70–99)
GLUCOSE BLDC GLUCOMTR-MCNC: 176 MG/DL — HIGH (ref 70–99)
GLUCOSE SERPL-MCNC: 127 MG/DL — HIGH (ref 70–99)
HCT VFR BLD CALC: 27.3 % — LOW (ref 39–50)
HGB BLD-MCNC: 8.7 G/DL — LOW (ref 13–17)
M PROTEIN 24H UR ELPH-MRATE: SIGNIFICANT CHANGE UP
MCHC RBC-ENTMCNC: 25.4 PG — LOW (ref 27–34)
MCHC RBC-ENTMCNC: 31.9 GM/DL — LOW (ref 32–36)
MCV RBC AUTO: 79.8 FL — LOW (ref 80–100)
NRBC # BLD: 0 /100 WBCS — SIGNIFICANT CHANGE UP (ref 0–0)
PLATELET # BLD AUTO: 227 K/UL — SIGNIFICANT CHANGE UP (ref 150–400)
POTASSIUM SERPL-MCNC: 4 MMOL/L — SIGNIFICANT CHANGE UP (ref 3.5–5.3)
POTASSIUM SERPL-SCNC: 4 MMOL/L — SIGNIFICANT CHANGE UP (ref 3.5–5.3)
RBC # BLD: 3.42 M/UL — LOW (ref 4.2–5.8)
RBC # FLD: 21.3 % — HIGH (ref 10.3–14.5)
SODIUM SERPL-SCNC: 136 MMOL/L — SIGNIFICANT CHANGE UP (ref 135–145)
SURGICAL PATHOLOGY STUDY: SIGNIFICANT CHANGE UP
WBC # BLD: 5.68 K/UL — SIGNIFICANT CHANGE UP (ref 3.8–10.5)
WBC # FLD AUTO: 5.68 K/UL — SIGNIFICANT CHANGE UP (ref 3.8–10.5)

## 2024-06-19 PROCEDURE — 99232 SBSQ HOSP IP/OBS MODERATE 35: CPT

## 2024-06-19 RX ORDER — CEFUROXIME SODIUM 7.5 G
250 VIAL (EA) INTRAVENOUS EVERY 12 HOURS
Refills: 0 | Status: DISCONTINUED | OUTPATIENT
Start: 2024-06-19 | End: 2024-06-20

## 2024-06-19 RX ADMIN — Medication 250 MILLIGRAM(S): at 05:38

## 2024-06-19 RX ADMIN — ATORVASTATIN CALCIUM 40 MILLIGRAM(S): 20 TABLET, FILM COATED ORAL at 21:26

## 2024-06-19 RX ADMIN — INSULIN LISPRO 1: 100 INJECTION, SOLUTION SUBCUTANEOUS at 17:26

## 2024-06-19 RX ADMIN — PANTOPRAZOLE SODIUM 40 MILLIGRAM(S): 40 INJECTION, POWDER, FOR SOLUTION INTRAVENOUS at 17:27

## 2024-06-19 RX ADMIN — Medication 250 MILLIGRAM(S): at 17:25

## 2024-06-19 RX ADMIN — Medication 25 MILLIGRAM(S): at 17:25

## 2024-06-19 RX ADMIN — INSULIN LISPRO 3 UNIT(S): 100 INJECTION, SOLUTION SUBCUTANEOUS at 12:44

## 2024-06-19 RX ADMIN — CLOPIDOGREL BISULFATE 75 MILLIGRAM(S): 75 TABLET, FILM COATED ORAL at 12:35

## 2024-06-19 RX ADMIN — APIXABAN 2.5 MILLIGRAM(S): 5 TABLET, FILM COATED ORAL at 05:39

## 2024-06-19 RX ADMIN — METRONIDAZOLE 500 MILLIGRAM(S): 500 TABLET ORAL at 21:26

## 2024-06-19 RX ADMIN — Medication 325 MILLIGRAM(S): at 12:35

## 2024-06-19 RX ADMIN — METRONIDAZOLE 500 MILLIGRAM(S): 500 TABLET ORAL at 14:28

## 2024-06-19 RX ADMIN — INSULIN LISPRO 3 UNIT(S): 100 INJECTION, SOLUTION SUBCUTANEOUS at 09:05

## 2024-06-19 RX ADMIN — APIXABAN 2.5 MILLIGRAM(S): 5 TABLET, FILM COATED ORAL at 17:25

## 2024-06-19 RX ADMIN — Medication 250 MILLIGRAM(S): at 17:29

## 2024-06-19 RX ADMIN — PANTOPRAZOLE SODIUM 40 MILLIGRAM(S): 40 INJECTION, POWDER, FOR SOLUTION INTRAVENOUS at 05:38

## 2024-06-19 RX ADMIN — Medication 650 MILLIGRAM(S): at 09:06

## 2024-06-19 RX ADMIN — LATANOPROST PF 1 DROP(S): 0.05 SOLUTION/ DROPS OPHTHALMIC at 21:26

## 2024-06-19 RX ADMIN — METRONIDAZOLE 500 MILLIGRAM(S): 500 TABLET ORAL at 05:38

## 2024-06-19 RX ADMIN — Medication 25 MILLIGRAM(S): at 05:38

## 2024-06-19 RX ADMIN — INSULIN GLARGINE 10 UNIT(S): 100 INJECTION, SOLUTION SUBCUTANEOUS at 21:25

## 2024-06-19 RX ADMIN — INSULIN LISPRO 3 UNIT(S): 100 INJECTION, SOLUTION SUBCUTANEOUS at 17:26

## 2024-06-20 ENCOUNTER — TRANSCRIPTION ENCOUNTER (OUTPATIENT)
Age: 89
End: 2024-06-20

## 2024-06-20 VITALS
HEART RATE: 69 BPM | DIASTOLIC BLOOD PRESSURE: 67 MMHG | TEMPERATURE: 98 F | OXYGEN SATURATION: 98 % | RESPIRATION RATE: 18 BRPM | SYSTOLIC BLOOD PRESSURE: 105 MMHG

## 2024-06-20 LAB
ALBUMIN SERPL ELPH-MCNC: 2.4 G/DL — LOW (ref 3.3–5)
ALP SERPL-CCNC: 144 U/L — HIGH (ref 40–120)
ALT FLD-CCNC: 35 U/L — SIGNIFICANT CHANGE UP (ref 10–45)
AMMONIA BLD-MCNC: 47 UMOL/L — SIGNIFICANT CHANGE UP (ref 11–55)
ANION GAP SERPL CALC-SCNC: 11 MMOL/L — SIGNIFICANT CHANGE UP (ref 5–17)
AST SERPL-CCNC: 47 U/L — HIGH (ref 10–40)
BILIRUB SERPL-MCNC: 1 MG/DL — SIGNIFICANT CHANGE UP (ref 0.2–1.2)
BUN SERPL-MCNC: 58 MG/DL — HIGH (ref 7–23)
CALCIUM SERPL-MCNC: 8.4 MG/DL — SIGNIFICANT CHANGE UP (ref 8.4–10.5)
CHLORIDE SERPL-SCNC: 104 MMOL/L — SIGNIFICANT CHANGE UP (ref 96–108)
CO2 SERPL-SCNC: 20 MMOL/L — LOW (ref 22–31)
CREAT SERPL-MCNC: 1.98 MG/DL — HIGH (ref 0.5–1.3)
EGFR: 30 ML/MIN/1.73M2 — LOW
GLUCOSE BLDC GLUCOMTR-MCNC: 101 MG/DL — HIGH (ref 70–99)
GLUCOSE BLDC GLUCOMTR-MCNC: 127 MG/DL — HIGH (ref 70–99)
GLUCOSE SERPL-MCNC: 123 MG/DL — HIGH (ref 70–99)
HCT VFR BLD CALC: 31.1 % — LOW (ref 39–50)
HGB BLD-MCNC: 9.8 G/DL — LOW (ref 13–17)
MCHC RBC-ENTMCNC: 25.6 PG — LOW (ref 27–34)
MCHC RBC-ENTMCNC: 31.5 GM/DL — LOW (ref 32–36)
MCV RBC AUTO: 81.2 FL — SIGNIFICANT CHANGE UP (ref 80–100)
NRBC # BLD: 0 /100 WBCS — SIGNIFICANT CHANGE UP (ref 0–0)
PLATELET # BLD AUTO: 291 K/UL — SIGNIFICANT CHANGE UP (ref 150–400)
POTASSIUM SERPL-MCNC: 4.6 MMOL/L — SIGNIFICANT CHANGE UP (ref 3.5–5.3)
POTASSIUM SERPL-SCNC: 4.6 MMOL/L — SIGNIFICANT CHANGE UP (ref 3.5–5.3)
PROT SERPL-MCNC: 6.5 G/DL — SIGNIFICANT CHANGE UP (ref 6–8.3)
RBC # BLD: 3.83 M/UL — LOW (ref 4.2–5.8)
RBC # FLD: 22 % — HIGH (ref 10.3–14.5)
SODIUM SERPL-SCNC: 135 MMOL/L — SIGNIFICANT CHANGE UP (ref 135–145)
WBC # BLD: 6.86 K/UL — SIGNIFICANT CHANGE UP (ref 3.8–10.5)
WBC # FLD AUTO: 6.86 K/UL — SIGNIFICANT CHANGE UP (ref 3.8–10.5)

## 2024-06-20 PROCEDURE — 85730 THROMBOPLASTIN TIME PARTIAL: CPT

## 2024-06-20 PROCEDURE — 83735 ASSAY OF MAGNESIUM: CPT

## 2024-06-20 PROCEDURE — 84155 ASSAY OF PROTEIN SERUM: CPT

## 2024-06-20 PROCEDURE — 86900 BLOOD TYPING SEROLOGIC ABO: CPT

## 2024-06-20 PROCEDURE — 84100 ASSAY OF PHOSPHORUS: CPT

## 2024-06-20 PROCEDURE — 88312 SPECIAL STAINS GROUP 1: CPT

## 2024-06-20 PROCEDURE — 99239 HOSP IP/OBS DSCHRG MGMT >30: CPT

## 2024-06-20 PROCEDURE — 82272 OCCULT BLD FECES 1-3 TESTS: CPT

## 2024-06-20 PROCEDURE — 83010 ASSAY OF HAPTOGLOBIN QUANT: CPT

## 2024-06-20 PROCEDURE — 93005 ELECTROCARDIOGRAM TRACING: CPT

## 2024-06-20 PROCEDURE — 86850 RBC ANTIBODY SCREEN: CPT

## 2024-06-20 PROCEDURE — 86325 OTHER IMMUNOELECTROPHORESIS: CPT

## 2024-06-20 PROCEDURE — 85027 COMPLETE CBC AUTOMATED: CPT

## 2024-06-20 PROCEDURE — 71045 X-RAY EXAM CHEST 1 VIEW: CPT

## 2024-06-20 PROCEDURE — 86901 BLOOD TYPING SEROLOGIC RH(D): CPT

## 2024-06-20 PROCEDURE — 84156 ASSAY OF PROTEIN URINE: CPT

## 2024-06-20 PROCEDURE — P9016: CPT

## 2024-06-20 PROCEDURE — 82746 ASSAY OF FOLIC ACID SERUM: CPT

## 2024-06-20 PROCEDURE — 99291 CRITICAL CARE FIRST HOUR: CPT

## 2024-06-20 PROCEDURE — 80053 COMPREHEN METABOLIC PANEL: CPT

## 2024-06-20 PROCEDURE — 87186 SC STD MICRODIL/AGAR DIL: CPT

## 2024-06-20 PROCEDURE — 84484 ASSAY OF TROPONIN QUANT: CPT

## 2024-06-20 PROCEDURE — 87086 URINE CULTURE/COLONY COUNT: CPT

## 2024-06-20 PROCEDURE — 97535 SELF CARE MNGMENT TRAINING: CPT

## 2024-06-20 PROCEDURE — 36430 TRANSFUSION BLD/BLD COMPNT: CPT

## 2024-06-20 PROCEDURE — 84165 PROTEIN E-PHORESIS SERUM: CPT

## 2024-06-20 PROCEDURE — 83540 ASSAY OF IRON: CPT

## 2024-06-20 PROCEDURE — 36415 COLL VENOUS BLD VENIPUNCTURE: CPT

## 2024-06-20 PROCEDURE — 87077 CULTURE AEROBIC IDENTIFY: CPT

## 2024-06-20 PROCEDURE — 82140 ASSAY OF AMMONIA: CPT

## 2024-06-20 PROCEDURE — 81001 URINALYSIS AUTO W/SCOPE: CPT

## 2024-06-20 PROCEDURE — 82607 VITAMIN B-12: CPT

## 2024-06-20 PROCEDURE — 74176 CT ABD & PELVIS W/O CONTRAST: CPT | Mod: MC

## 2024-06-20 PROCEDURE — 86923 COMPATIBILITY TEST ELECTRIC: CPT

## 2024-06-20 PROCEDURE — 82962 GLUCOSE BLOOD TEST: CPT

## 2024-06-20 PROCEDURE — 85025 COMPLETE CBC W/AUTO DIFF WBC: CPT

## 2024-06-20 PROCEDURE — 83550 IRON BINDING TEST: CPT

## 2024-06-20 PROCEDURE — 82728 ASSAY OF FERRITIN: CPT

## 2024-06-20 PROCEDURE — 88305 TISSUE EXAM BY PATHOLOGIST: CPT

## 2024-06-20 PROCEDURE — 85610 PROTHROMBIN TIME: CPT

## 2024-06-20 PROCEDURE — 80048 BASIC METABOLIC PNL TOTAL CA: CPT

## 2024-06-20 PROCEDURE — 70450 CT HEAD/BRAIN W/O DYE: CPT | Mod: MC

## 2024-06-20 PROCEDURE — 97165 OT EVAL LOW COMPLEX 30 MIN: CPT

## 2024-06-20 PROCEDURE — 71045 X-RAY EXAM CHEST 1 VIEW: CPT | Mod: 26

## 2024-06-20 PROCEDURE — 86334 IMMUNOFIX E-PHORESIS SERUM: CPT

## 2024-06-20 RX ORDER — METOPROLOL TARTRATE 50 MG
25 TABLET ORAL DAILY
Refills: 0 | Status: DISCONTINUED | OUTPATIENT
Start: 2024-06-20 | End: 2024-06-20

## 2024-06-20 RX ORDER — METRONIDAZOLE 500 MG/1
1 TABLET ORAL
Qty: 0 | Refills: 0 | DISCHARGE
Start: 2024-06-20

## 2024-06-20 RX ORDER — TORSEMIDE 20 MG/1
20 TABLET ORAL AT BEDTIME
Refills: 0 | Status: DISCONTINUED | OUTPATIENT
Start: 2024-06-20 | End: 2024-06-20

## 2024-06-20 RX ORDER — TORSEMIDE 20 MG/1
1 TABLET ORAL
Qty: 0 | Refills: 0 | DISCHARGE
Start: 2024-06-20

## 2024-06-20 RX ORDER — METOPROLOL TARTRATE 50 MG
1 TABLET ORAL
Qty: 0 | Refills: 0
Start: 2024-06-20

## 2024-06-20 RX ORDER — IPRATROPIUM BROMIDE AND ALBUTEROL SULFATE .5; 3 MG/3ML; MG/3ML
3 SOLUTION RESPIRATORY (INHALATION) EVERY 6 HOURS
Refills: 0 | Status: DISCONTINUED | OUTPATIENT
Start: 2024-06-20 | End: 2024-06-20

## 2024-06-20 RX ORDER — IPRATROPIUM BROMIDE 0.5 MG/2.5ML
500 SOLUTION RESPIRATORY (INHALATION) EVERY 6 HOURS
Refills: 0 | Status: DISCONTINUED | OUTPATIENT
Start: 2024-06-20 | End: 2024-06-20

## 2024-06-20 RX ORDER — CEFUROXIME SODIUM 7.5 G
1 VIAL (EA) INTRAVENOUS
Qty: 0 | Refills: 0 | DISCHARGE
Start: 2024-06-20

## 2024-06-20 RX ORDER — METOPROLOL TARTRATE 50 MG
1 TABLET ORAL
Qty: 1 | Refills: 0
Start: 2024-06-20

## 2024-06-20 RX ORDER — METFORMIN HYDROCHLORIDE 850 MG/1
1 TABLET ORAL
Refills: 0 | DISCHARGE

## 2024-06-20 RX ORDER — IPRATROPIUM BROMIDE AND ALBUTEROL SULFATE .5; 3 MG/3ML; MG/3ML
3 SOLUTION RESPIRATORY (INHALATION)
Qty: 0 | Refills: 0 | DISCHARGE
Start: 2024-06-20

## 2024-06-20 RX ADMIN — Medication 325 MILLIGRAM(S): at 11:39

## 2024-06-20 RX ADMIN — APIXABAN 2.5 MILLIGRAM(S): 5 TABLET, FILM COATED ORAL at 05:21

## 2024-06-20 RX ADMIN — INSULIN LISPRO 3 UNIT(S): 100 INJECTION, SOLUTION SUBCUTANEOUS at 12:43

## 2024-06-20 RX ADMIN — PANTOPRAZOLE SODIUM 40 MILLIGRAM(S): 40 INJECTION, POWDER, FOR SOLUTION INTRAVENOUS at 05:20

## 2024-06-20 RX ADMIN — METRONIDAZOLE 500 MILLIGRAM(S): 500 TABLET ORAL at 13:12

## 2024-06-20 RX ADMIN — CLOPIDOGREL BISULFATE 75 MILLIGRAM(S): 75 TABLET, FILM COATED ORAL at 11:39

## 2024-06-20 RX ADMIN — Medication 25 MILLIGRAM(S): at 05:21

## 2024-06-20 RX ADMIN — Medication 250 MILLIGRAM(S): at 05:20

## 2024-06-20 RX ADMIN — INSULIN LISPRO 3 UNIT(S): 100 INJECTION, SOLUTION SUBCUTANEOUS at 08:38

## 2024-06-20 RX ADMIN — METRONIDAZOLE 500 MILLIGRAM(S): 500 TABLET ORAL at 05:20

## 2024-09-21 ENCOUNTER — EMERGENCY (EMERGENCY)
Facility: HOSPITAL | Age: 89
LOS: 1 days | Discharge: HOME | End: 2024-09-21
Attending: EMERGENCY MEDICINE | Admitting: EMERGENCY MEDICINE
Payer: MEDICARE

## 2024-09-21 VITALS
TEMPERATURE: 98 F | DIASTOLIC BLOOD PRESSURE: 58 MMHG | OXYGEN SATURATION: 98 % | HEIGHT: 69 IN | SYSTOLIC BLOOD PRESSURE: 127 MMHG | RESPIRATION RATE: 18 BRPM | WEIGHT: 164.91 LBS | HEART RATE: 70 BPM

## 2024-09-21 VITALS
SYSTOLIC BLOOD PRESSURE: 142 MMHG | RESPIRATION RATE: 18 BRPM | DIASTOLIC BLOOD PRESSURE: 76 MMHG | OXYGEN SATURATION: 98 % | HEART RATE: 76 BPM | TEMPERATURE: 98 F

## 2024-09-21 LAB
ALBUMIN SERPL ELPH-MCNC: 2.8 G/DL — LOW (ref 3.3–5)
ALP SERPL-CCNC: 145 U/L — HIGH (ref 40–120)
ALT FLD-CCNC: 21 U/L — SIGNIFICANT CHANGE UP (ref 10–45)
ANION GAP SERPL CALC-SCNC: 8 MMOL/L — SIGNIFICANT CHANGE UP (ref 5–17)
APPEARANCE UR: CLEAR — SIGNIFICANT CHANGE UP
APTT BLD: 36.1 SEC — HIGH (ref 24.5–35.6)
AST SERPL-CCNC: 19 U/L — SIGNIFICANT CHANGE UP (ref 10–40)
BASOPHILS # BLD AUTO: 0.03 K/UL — SIGNIFICANT CHANGE UP (ref 0–0.2)
BASOPHILS NFR BLD AUTO: 0.6 % — SIGNIFICANT CHANGE UP (ref 0–2)
BILIRUB SERPL-MCNC: 0.9 MG/DL — SIGNIFICANT CHANGE UP (ref 0.2–1.2)
BILIRUB UR-MCNC: NEGATIVE — SIGNIFICANT CHANGE UP
BUN SERPL-MCNC: 63 MG/DL — HIGH (ref 7–23)
CALCIUM SERPL-MCNC: 9.3 MG/DL — SIGNIFICANT CHANGE UP (ref 8.4–10.5)
CHLORIDE SERPL-SCNC: 99 MMOL/L — SIGNIFICANT CHANGE UP (ref 96–108)
CO2 SERPL-SCNC: 30 MMOL/L — SIGNIFICANT CHANGE UP (ref 22–31)
COLOR SPEC: YELLOW — SIGNIFICANT CHANGE UP
CREAT SERPL-MCNC: 1.54 MG/DL — HIGH (ref 0.5–1.3)
DIFF PNL FLD: NEGATIVE — SIGNIFICANT CHANGE UP
EGFR: 41 ML/MIN/1.73M2 — LOW
EOSINOPHIL # BLD AUTO: 0.12 K/UL — SIGNIFICANT CHANGE UP (ref 0–0.5)
EOSINOPHIL NFR BLD AUTO: 2.5 % — SIGNIFICANT CHANGE UP (ref 0–6)
GLUCOSE SERPL-MCNC: 224 MG/DL — HIGH (ref 70–99)
GLUCOSE UR QL: NEGATIVE MG/DL — SIGNIFICANT CHANGE UP
HCT VFR BLD CALC: 28.7 % — LOW (ref 39–50)
HGB BLD-MCNC: 9.2 G/DL — LOW (ref 13–17)
IMM GRANULOCYTES NFR BLD AUTO: 0.6 % — SIGNIFICANT CHANGE UP (ref 0–0.9)
INR BLD: 2.1 RATIO — HIGH (ref 0.85–1.18)
KETONES UR-MCNC: NEGATIVE MG/DL — SIGNIFICANT CHANGE UP
LACTATE SERPL-SCNC: 2.2 MMOL/L — HIGH (ref 0.7–2)
LEUKOCYTE ESTERASE UR-ACNC: NEGATIVE — SIGNIFICANT CHANGE UP
LIDOCAIN IGE QN: 92 U/L — HIGH (ref 16–77)
LYMPHOCYTES # BLD AUTO: 0.45 K/UL — LOW (ref 1–3.3)
LYMPHOCYTES # BLD AUTO: 9.2 % — LOW (ref 13–44)
MCHC RBC-ENTMCNC: 29.6 PG — SIGNIFICANT CHANGE UP (ref 27–34)
MCHC RBC-ENTMCNC: 32.1 GM/DL — SIGNIFICANT CHANGE UP (ref 32–36)
MCV RBC AUTO: 92.3 FL — SIGNIFICANT CHANGE UP (ref 80–100)
MONOCYTES # BLD AUTO: 0.69 K/UL — SIGNIFICANT CHANGE UP (ref 0–0.9)
MONOCYTES NFR BLD AUTO: 14.1 % — HIGH (ref 2–14)
NEUTROPHILS # BLD AUTO: 3.56 K/UL — SIGNIFICANT CHANGE UP (ref 1.8–7.4)
NEUTROPHILS NFR BLD AUTO: 73 % — SIGNIFICANT CHANGE UP (ref 43–77)
NITRITE UR-MCNC: NEGATIVE — SIGNIFICANT CHANGE UP
NRBC # BLD: 0 /100 WBCS — SIGNIFICANT CHANGE UP (ref 0–0)
NT-PROBNP SERPL-SCNC: 6373 PG/ML — HIGH (ref 0–300)
PH UR: 6 — SIGNIFICANT CHANGE UP (ref 5–8)
PLATELET # BLD AUTO: 198 K/UL — SIGNIFICANT CHANGE UP (ref 150–400)
POTASSIUM SERPL-MCNC: 4.1 MMOL/L — SIGNIFICANT CHANGE UP (ref 3.5–5.3)
POTASSIUM SERPL-SCNC: 4.1 MMOL/L — SIGNIFICANT CHANGE UP (ref 3.5–5.3)
PROT SERPL-MCNC: 6.9 G/DL — SIGNIFICANT CHANGE UP (ref 6–8.3)
PROT UR-MCNC: NEGATIVE MG/DL — SIGNIFICANT CHANGE UP
PROTHROM AB SERPL-ACNC: 24.1 SEC — HIGH (ref 9.5–13)
RBC # BLD: 3.11 M/UL — LOW (ref 4.2–5.8)
RBC # FLD: 19.5 % — HIGH (ref 10.3–14.5)
SODIUM SERPL-SCNC: 137 MMOL/L — SIGNIFICANT CHANGE UP (ref 135–145)
SP GR SPEC: 1.01 — SIGNIFICANT CHANGE UP (ref 1–1.03)
UROBILINOGEN FLD QL: 1 MG/DL — SIGNIFICANT CHANGE UP (ref 0.2–1)
WBC # BLD: 4.88 K/UL — SIGNIFICANT CHANGE UP (ref 3.8–10.5)
WBC # FLD AUTO: 4.88 K/UL — SIGNIFICANT CHANGE UP (ref 3.8–10.5)

## 2024-09-21 PROCEDURE — 74177 CT ABD & PELVIS W/CONTRAST: CPT | Mod: MC

## 2024-09-21 PROCEDURE — 83880 ASSAY OF NATRIURETIC PEPTIDE: CPT

## 2024-09-21 PROCEDURE — 80053 COMPREHEN METABOLIC PANEL: CPT

## 2024-09-21 PROCEDURE — 83690 ASSAY OF LIPASE: CPT

## 2024-09-21 PROCEDURE — 36415 COLL VENOUS BLD VENIPUNCTURE: CPT

## 2024-09-21 PROCEDURE — 74177 CT ABD & PELVIS W/CONTRAST: CPT | Mod: 26,MC

## 2024-09-21 PROCEDURE — 81003 URINALYSIS AUTO W/O SCOPE: CPT

## 2024-09-21 PROCEDURE — 99285 EMERGENCY DEPT VISIT HI MDM: CPT | Mod: 25

## 2024-09-21 PROCEDURE — 93005 ELECTROCARDIOGRAM TRACING: CPT

## 2024-09-21 PROCEDURE — 85730 THROMBOPLASTIN TIME PARTIAL: CPT

## 2024-09-21 PROCEDURE — 83605 ASSAY OF LACTIC ACID: CPT

## 2024-09-21 PROCEDURE — 85610 PROTHROMBIN TIME: CPT

## 2024-09-21 PROCEDURE — 85025 COMPLETE CBC W/AUTO DIFF WBC: CPT

## 2024-09-21 PROCEDURE — 99285 EMERGENCY DEPT VISIT HI MDM: CPT

## 2024-09-21 PROCEDURE — 93010 ELECTROCARDIOGRAM REPORT: CPT

## 2024-09-21 NOTE — ED ADULT TRIAGE NOTE - CHIEF COMPLAINT QUOTE
pt axo3, BIB[facility], for LLQ pain for x2 week. pt denies n/v/d/consitpation, chest pain, or sob. last BM last night at 6 pm.

## 2024-09-21 NOTE — ED PROVIDER NOTE - NSFOLLOWUPINSTRUCTIONS_ED_ALL_ED_FT
Called mother n/a l/m   Please follow-up with your doctor in 1 to 3 days to reassess symptoms     return to the nearest emergency medicine department immediately if any new/worsening symptoms

## 2024-09-21 NOTE — ED PROVIDER NOTE - PATIENT PORTAL LINK FT
You can access the FollowMyHealth Patient Portal offered by [facility] by registering at the following website: [email or web address]. By joining [facility]’s FollowMyHealth portal, you will also be able to view your health information using other applications (apps) compatible with our system.

## 2024-09-21 NOTE — ED ADULT NURSE NOTE - NSFALLHARMRISKINTERV_ED_ALL_ED

## 2024-09-21 NOTE — ED PROVIDER NOTE - OBJECTIVE STATEMENT
89-year-old male with left lower quadrant pain rating down to the leg earlier today.  Patient states that he was going to the dining room and felt the pain, took Tylenol, pain improved, currently pain-free.  Denies any trauma, dysuria, nausea vomiting diarrhea, chest/back/neck pain, focal weakness/numbness.  Denies any other symptoms

## 2024-09-21 NOTE — ED PROVIDER NOTE - CLINICAL SUMMARY MEDICAL DECISION MAKING FREE TEXT BOX
89 year old male with LLQ pain, labs reviewed 89 year old male with LLQ pain, labs reviewed- mild elevation in lactate noted, but I don't think this indicates ischemic process- patient has non toxic exam/other labs acceptable, CT showed no acute pathology, and patient is pain free, will dc

## 2024-09-23 ENCOUNTER — EMERGENCY (EMERGENCY)
Facility: HOSPITAL | Age: 89
LOS: 1 days | Discharge: HOME | End: 2024-09-23
Attending: INTERNAL MEDICINE | Admitting: INTERNAL MEDICINE
Payer: MEDICARE

## 2024-09-23 VITALS
HEART RATE: 70 BPM | OXYGEN SATURATION: 95 % | RESPIRATION RATE: 18 BRPM | DIASTOLIC BLOOD PRESSURE: 75 MMHG | SYSTOLIC BLOOD PRESSURE: 122 MMHG | TEMPERATURE: 97 F

## 2024-09-23 VITALS
HEIGHT: 69 IN | DIASTOLIC BLOOD PRESSURE: 64 MMHG | OXYGEN SATURATION: 97 % | SYSTOLIC BLOOD PRESSURE: 122 MMHG | TEMPERATURE: 98 F | RESPIRATION RATE: 16 BRPM | HEART RATE: 69 BPM | WEIGHT: 192.02 LBS

## 2024-09-23 PROCEDURE — 72170 X-RAY EXAM OF PELVIS: CPT

## 2024-09-23 PROCEDURE — 99284 EMERGENCY DEPT VISIT MOD MDM: CPT

## 2024-09-23 PROCEDURE — 71045 X-RAY EXAM CHEST 1 VIEW: CPT | Mod: 26

## 2024-09-23 PROCEDURE — 72170 X-RAY EXAM OF PELVIS: CPT | Mod: 26

## 2024-09-23 PROCEDURE — 99284 EMERGENCY DEPT VISIT MOD MDM: CPT | Mod: 25

## 2024-09-23 PROCEDURE — 71045 X-RAY EXAM CHEST 1 VIEW: CPT

## 2024-09-23 RX ORDER — ACETAMINOPHEN 325 MG/1
975 TABLET ORAL ONCE
Refills: 0 | Status: COMPLETED | OUTPATIENT
Start: 2024-09-23 | End: 2024-09-23

## 2024-09-23 RX ADMIN — ACETAMINOPHEN 975 MILLIGRAM(S): 325 TABLET ORAL at 13:48

## 2024-09-23 NOTE — ED ADULT NURSE REASSESSMENT NOTE - NS ED NURSE REASSESS COMMENT FT1
Received report from Moni CRUZ. Patient received A&Ox1 as per baseline. Pt offering no complaints and is in no acute distress at this time.  Respirations even and unlabored.  Stretcher in lowest position, wheels locked, side rails up and call light in reach. Vital signs as per flowsheet. Awaiting safe transport for discharge.

## 2024-09-23 NOTE — ED PROVIDER NOTE - WET READ LAUNCH FT
Subjective     Patient ID: Marlon Sanchez There are 2 Wet Read(s) to document.

## 2024-09-23 NOTE — ED ADULT NURSE NOTE - OBJECTIVE STATEMENT
Patient presents to ED from [facility] for left sided pain that started after a fall out of bed last night. As per EMS, staff states he rolled out of bed last night, no head injury,  no LOC, and wasn't complaining of any injuries at the time. This morning patient told staff his left side started to hurt but is unable to tell me exactly what hurts. Patient confused at baseline.

## 2024-09-23 NOTE — ED PROVIDER NOTE - PATIENT PORTAL LINK FT
You can access the FollowMyHealth Patient Portal offered by [facility] by registering at the following website: [email or web address]. By joining [facility]’s FollowMyHealth portal, you will also be able to view your health information using other applications (apps) compatible with our system.

## 2024-09-23 NOTE — ED PROVIDER NOTE - OBJECTIVE STATEMENT
89-year-old male from home brought in by EMS status post fall was sent to the emergency room for evaluation patient has some chronic pain on the left side no change in the mental status patient is minimally verbal and mostly bedbound

## 2024-09-23 NOTE — ED ADULT TRIAGE NOTE - ARRIVAL INFO ADDITIONAL COMMENTS
Patient BIB EMS from [facility] for unwitnessed fall out of bed last night. Was placed back in bed by staff, though felt as though he needed evaluation this morning. Patient with history of dementia, at baseline mental status per EMS. Chronic left sided body pain with some old skin tears noted. Staff denies LOC.

## 2024-09-23 NOTE — ED ADULT NURSE NOTE - NSFALLRISKINTERV_ED_ALL_ED
Assistance OOB with selected safe patient handling equipment if applicable/Assistance with ambulation/Communicate fall risk and risk factors to all staff, patient, and family/Monitor gait and stability/Provide visual cue: yellow wristband, yellow gown, etc/Reinforce activity limits and safety measures with patient and family/Call bell, personal items and telephone in reach/Instruct patient to call for assistance before getting out of bed/chair/stretcher/Non-slip footwear applied when patient is off stretcher/[place] to call system/Physically safe environment - no spills, clutter or unnecessary equipment/Purposeful Proactive Rounding/Room/bathroom lighting operational, light cord in reach

## 2024-09-23 NOTE — ED PROVIDER NOTE - CLINICAL SUMMARY MEDICAL DECISION MAKING FREE TEXT BOX
89-year-old male from home brought in by EMS status post fall was sent to the emergency room for evaluation patient has some chronic pain on the left side no change in the mental status patient is minimally verbal and mostly bedbound   Patient has no significant tenderness anywhere no signs of any trauma chest x-ray of the pelvis is negative patient given Tylenol

## 2024-09-23 NOTE — ED ADULT TRIAGE NOTE - CADM TRG TX PRIOR TO ARRIVAL
Fax [phone] with letter to Kehinde Murray   Letter ok   Patient would like work excuse to allow her off the remainder of this week as she just had the surgery below and isn't feeling well enough to return to work at this time. Has appointment 09/22 with PCP.    Okay for work excuse?   Pt had day surgery on 9/14 , she had two stents and siliac block by  at [facility], there were complications and she was there until yesterday.She states she just  got home yesterday and she is in pain and she can't return to work and  office is not helping her and they are frustrating her.   ambulatory none

## 2024-09-23 NOTE — ED PROVIDER NOTE - PHYSICAL EXAMINATION
General:     NAD, well-nourished, well-appearing  Head:     NC/AT, EOMI, oral mucosa moist  Neck:     trachea midline  Lungs:     CTA b/l, no w/r/r  CVS:     S1S2, RRR, no m/g/r  Abd:     +BS, s/nt/nd, no organomegaly  Ext:    2+ radial and pedal pulses, no c/c/e  Neuro: AA no sensory/motor deficits

## 2024-09-25 ENCOUNTER — RESULT REVIEW (OUTPATIENT)
Age: 89
End: 2024-09-25

## 2024-09-25 NOTE — H&P ADULT - HISTORY OF PRESENT ILLNESS
89-year-old male with PMHx HTN, CAD s/p CABG and PCI on Plavix, A fib on Xarelto, PPM, PVD, T2DM, essential tremor, hx prostate cancer, presenting via EMS from assisted living with reported difficulty breathing, patient was reportedly given DuoNeb by staff, patient without reported fever or chills, denies any cough or congestion, denies any chest pain or shortness of breath, no reported abdominal pain.  No other reported complaints.  Was recently seen in the ED for fall.    pt unable to provide history, confused, per daughter pts mental stsus has been declining sicne jun/24  when he was admitted for GI bleed.   more recently he had shingles 1 month back and was isolated at assisted living which further declined his functional staus, he is able to stand with help but moslty in w/c now.  he was noted to be SOB at assisted living and was sent to ED, per DTR pt always has baseline SOB due to h/o chf.  pt is DNR/DNI.    ED COURSE- WBC Count: 17.57 K/uL (09.25.24 @ 09:10). Lactate, Blood: 4.3 mmol/L (09.25.24 @ 12:20), Creatinine: 4.41 mg/dL (09.25.24 @ 12:20), Pro-Brain Natriuretic Peptide: 6373 pg/mL (09.21.24 @ 12:55)  Potassium: 7.0 mmol/L (09.25.24 @ 09:10) improved to 4.9 with treatment   received 3.3 L NS. BP initially low in 90's improved to 120's with fluids   sats in 90's.  EKG- tacy 136 paced, later HR improved to 70's,   CT Abdomen and Pelvis No Cont (09.25.24 @ 11:03) >No evidence of an abscess collection or inflammatory process.Persistent contrast within the kidneys concerning for contrast nephropathy.  BILE DUCTS: Normal caliber.GALLBLADDER: Cholecystectomy.   CT Chest No Cont (09.25.24 @ 09:34) >IMPRESSION:Moderate-sized loculated and nodular left pleural effusion is increased from the prior study. Right-sided pleural thickening. Small nodular and loculated right pleural effusion is slightly enlarged    seen by ICU and renal in ED- recc noted.

## 2024-09-25 NOTE — ED PROVIDER NOTE - CLINICAL SUMMARY MEDICAL DECISION MAKING FREE TEXT BOX
89-year-old male with history of hypertension, DM, CAD, pacemaker presenting to the ED via EMS from assisted living with reported difficulty breathing, patient was reportedly given DuoNeb by staff, patient without reported fever or chills, denies any cough or congestion, denies any chest pain or shortness of breath, no reported abdominal pain.  No other reported complaints.  Was recently seen in the ED for fall.    EKG normal pacemaker rhythm rate of 66, lung sounds are clear to auscultation, patient saturating 99% room air, x-ray with questionable right middle lobe infiltrate, appears to be similar to prior x-rays, will obtain CT chest for further evaluation, patient is afebrile, appears mildly dehydrated will give small fluid bolus and reassess, follow-up labs imaging

## 2024-09-25 NOTE — H&P ADULT - NSHPPHYSICALEXAM_GEN_ALL_CORE
Vital Signs Last 24 Hrs  T(C): 35.4 (25 Sep 2024 09:02), Max: 36.7 (25 Sep 2024 08:45)  T(F): 95.7 (25 Sep 2024 09:02), Max: 98 (25 Sep 2024 08:45)  HR: 70 (25 Sep 2024 13:00) (70 - 100)  BP: 134/90 (25 Sep 2024 13:00) (92/79 - 134/90)  BP(mean): 101 (25 Sep 2024 13:00) (74 - 101)  RR: 20 (25 Sep 2024 13:00) (17 - 22)  SpO2: 98% (25 Sep 2024 13:00) (97% - 100%)    Parameters below as of 25 Sep 2024 13:00  Patient On (Oxygen Delivery Method): room air      GENERAL- NAD  EAR/NOSE/MOUTH/THROAT -  MMM  EYES- RAZIA, conjunctiva and Sclera clear  NECK- supple  RESPIRATORY-   basilar rales to auscultation bilaterally  CARDIOVASCULAR - SIS2, RRR  GI - soft NT BS present  EXTREMITIES- B/L LE  edema  NEUROLOGY- no gross focal deficits, hand tremors chronic   PSYCHIATRY- AAO X 0

## 2024-09-25 NOTE — CONSULT NOTE ADULT - ASSESSMENT
89-year-old male with PMHx HTN, CAD s/p CABG and PCI on Plavix, A fib on Xarelto, PPM, PVD, T2DM, essential tremor, hx prostate cancer admitted with sepsis and curly. cardiology consulted for tachycardia and concern for hf exacerbation. pt is currently a poor historian    ekg consistent with Vpacing  TTE 6/2024 with EF 40%, mild valvular disease and bioprosthetic valve in aortic position    recconmendations  continue to monitor on tele for arrythmias. [email or web address] ppm interrogation called into rep  check tte to eval EF  pt does not appear overloaded, will await tte for diuresis however, recommend renal input regarding diuresis as pt with worsening curly on ckd 89-year-old male with PMHx HTN, CAD s/p CABG and PCI on Plavix, A fib on Xarelto, PPM, PVD, T2DM, essential tremor, hx prostate cancer admitted with sepsis and curly. cardiology consulted for tachycardia and concern for hf exacerbation. pt is currently a poor historian    ekg consistent with Vpacing  TTE 6/2024 with EF 40%, mild valvular disease and bioprosthetic valve in aortic position    recconmendations  continue to monitor on tele for arrythmias. [employer] ppm interrogation called into rep  check tte to eval EF  pt does not appear overloaded, will await tte for diuresis however, recommend renal input regarding diuresis as pt with worsening curly  89-year-old male with PMHx HTN, CAD s/p CABG and PCI, A fib on Xarelto, PPM, PVD, T2DM, essential tremor, hx prostate cancer admitted with multiorgan failure and concern for sepsis.     Cardiology consulted for tachycardia and concern for hf exacerbation. pt is currently a poor historian    ekg consistent with Vpacing  TTE 6/2024 with EF 40%, mild valvular disease and bioprosthetic valve in aortic position    recommendations  continue to monitor on tele for arrythmias. [employer] ppm interrogation called into rep  check tte to eval EF  pt does not appear overloaded, recommend renal input

## 2024-09-25 NOTE — CONSULT NOTE ADULT - NS ATTEND AMEND GEN_ALL_CORE FT
I have seen and examined the patient. I have discussed case with INA Sotelo.    Richard Davila is a 89 year old man with past medical history of Coronary artery disease (s/p CABG & PCIs), HFrEF (LVEF 30%), Atrial fibrillation (on Eliquis), TAVR, Permanent pacemaker, Hypertension, Chronic kidney disease, Prostate cancer (s/p surgery) was brought in by EMS due to respiratory distress, found to have multiorgan failure with cardiorenal syndrome and concern for possible sepsis.    ECG consistent with underlying atrial fibrillation and ventricular paced rhythm, similar to prior ECG. CT chest consistent with moderate-sized loculated left pleural effusion which has increased and small loculated right pleural effusion that is increased. Overall, the patient appears clinically hypovolemic. In regards to the HFrEF, would obtain echo and re-evaluate LVEF and filling pressures, would avoid IV diuresis for now and would obtain Renal consult given progressive renal dysfunction. BP stable, however would not resume any home antihypertensives given concern for impending low output heart failure or sepsis. Plan for [employer] PPM interrogation. For the AF, would ensure no bleeding and ensure INR trends down, prior to resuming Eliquis. Sepsis workup per primary team. Recommend goals of care discussion as patient is mostly non-verbal at this present time and is 89 years old with advanced heart failure.     Will sign out this case to cardiologist to follow along tomorrow.    Natasha Bains MD  Cardiology

## 2024-09-25 NOTE — CONSULT NOTE ADULT - SUBJECTIVE AND OBJECTIVE BOX
HPI:   Patient is a 89y male with ppm, dm, cad, recent stay for chf and recent  ED visit for llq pain, no source seen on ct a/p with contrast. He now returns from assisted living with sob,  altered ms, alvin, lactic acidosis, hyper kalemia, leukocytosis, mild liver enzyme and bili elevation.  I cannot get any information from the patient. He has mild hypothermia but no fever.     REVIEW OF SYSTEMS:  All other review of systems negative (Comprehensive ROS)    PAST MEDICAL & SURGICAL HISTORY:  Pacemaker      CAD (coronary artery disease)      HTN (hypertension)      Diabetes      No significant past surgical history          Allergies    No Known Allergies    Intolerances        Antimicrobials Day #  :1  piperacillin/tazobactam IVPB.. 3.375 Gram(s) IV Intermittent every 12 hours    Other Medications:  acetaminophen     Tablet .. 650 milliGRAM(s) Oral every 6 hours PRN  aluminum hydroxide/magnesium hydroxide/simethicone Suspension 30 milliLiter(s) Oral every 4 hours PRN  AQUAPHOR (petrolatum Ointment) 1 Application(s) Topical daily  atorvastatin 40 milliGRAM(s) Oral at bedtime  clopidogrel Tablet 75 milliGRAM(s) Oral daily  dextrose 5% 1000 milliLiter(s) IV Continuous <Continuous>  dextrose 5%. 1000 milliLiter(s) IV Continuous <Continuous>  dextrose 5%. 1000 milliLiter(s) IV Continuous <Continuous>  dextrose 50% Injectable 25 Gram(s) IV Push once  dextrose 50% Injectable 12.5 Gram(s) IV Push once  dextrose 50% Injectable 25 Gram(s) IV Push once  dextrose Oral Gel 15 Gram(s) Oral once PRN  doxazosin 2 milliGRAM(s) Oral daily  ferrous    sulfate 325 milliGRAM(s) Oral daily  glucagon  Injectable 1 milliGRAM(s) IntraMuscular once  insulin lispro (ADMELOG) corrective regimen sliding scale   SubCutaneous three times a day before meals  insulin lispro (ADMELOG) corrective regimen sliding scale   SubCutaneous at bedtime  latanoprost 0.005% Ophthalmic Solution 1 Drop(s) Both EYES at bedtime  melatonin 3 milliGRAM(s) Oral at bedtime PRN  metoprolol succinate ER 25 milliGRAM(s) Oral daily  multivitamin 1 Tablet(s) Oral daily  ondansetron Injectable 4 milliGRAM(s) IV Push every 8 hours PRN      FAMILY HISTORY:      SOCIAL HISTORY:  Smoking: [ ]Yes [ ]No  ETOH: [ ]Yes [ ]No  Drug Use: [ ]Yes [ ]No  CANNOT GET   [x ] Single[ ]    T(F): 96.9 (09-25-24 @ 17:22), Max: 98 (09-25-24 @ 08:45)  HR: 99 (09-25-24 @ 17:22)  BP: 111/50 (09-25-24 @ 17:22)  RR: 22 (09-25-24 @ 17:22)  SpO2: 98% (09-25-24 @ 17:22)  Wt(kg): --    PHYSICAL EXAM:  General: awake, agitated, pulling on gown, iv etc  Eyes:  anicteric, no conjunctival injection, no discharge  Oropharynx: no lesions or injection 	  Neck: supple, without adenopathy  Lungs: poor cooperation to breaths to auscultation  Heart: regular rate and rhythm; no murmur, rubs or gallops  Abdomen: soft, nondistended, nontender, without mass or organomegaly  Skin: no lesions  Extremities: no clubbing, cyanosis, . legs some edema, skin tears  Neurologic: very confused  moves all extremities  scrotum no swelling  back no ulcers  LAB RESULTS:                        9.8    17.57 )-----------( 209      ( 25 Sep 2024 09:10 )             29.2     09-25    137  |  102  |  117[H]  ----------------------------<  114[H]  4.9   |  16[L]  |  4.41[H]    Ca    7.4[L]      25 Sep 2024 12:20    TPro  6.6  /  Alb  2.5[L]  /  TBili  2.8[H]  /  DBili  x   /  AST  131[H]  /  ALT  73[H]  /  AlkPhos  187[H]  09-25    LIVER FUNCTIONS - ( 25 Sep 2024 09:10 )  Alb: 2.5 g/dL / Pro: 6.6 g/dL / ALK PHOS: 187 U/L / ALT: 73 U/L / AST: 131 U/L / GGT: x           Urinalysis Basic - ( 25 Sep 2024 12:20 )    Color: x / Appearance: x / SG: x / pH: x  Gluc: 114 mg/dL / Ketone: x  / Bili: x / Urobili: x   Blood: x / Protein: x / Nitrite: x   Leuk Esterase: x / RBC: x / WBC x   Sq Epi: x / Non Sq Epi: x / Bacteria: x        MICROBIOLOGY:  RECENT CULTURES:        RADIOLOGY REVIEWED:    < from: US Abdomen Upper Quadrant Right (09.25.24 @ 11:05) >  ACC: [id number] EXAM:  US ABDOMEN RT UPR QUADRANT   ORDERED BY: SADIE TANG     PROCEDURE DATE:  09/25/2024          INTERPRETATION:  CLINICAL INFORMATION: Elevated LFTs    COMPARISON: None available.    TECHNIQUE: Sonography of the right upper quadrant.    FINDINGS:  Liver: Mildly lobulated hepatic contour possible cirrhosis. No focal   abnormality.  Bile ducts: Normal caliber. Common bile duct measures 2 mm.  Gallbladder: Cholecystectomy.  Pancreas: Visualized portions are within normallimits.  Right kidney: 9.2 cm. No hydronephrosis.  Ascites: Trace ascites  IVC: Visualized portions are within normal limits.    IMPRESSION:    Mildly lobulated hepatic contour possible cirrhosis.  No biliary dilatation. No acute finding.  Trace ascites    --- End of Report ---      < end of copied text >      PROCEDURE DATE:  09/25/2024          INTERPRETATION:  CLINICAL INFORMATION: ALVIN, sepsis. Rule out   collection/hydronephrosis.    COMPARISON: 09/21/2024.    CONTRAST/COMPLICATIONS:  IV Contrast: NONE  Oral Contrast: NONE  Complications: None reported at time of study completion    PROCEDURE:  CT of the Abdomen and Pelvis was performed.  Sagittal and coronal reformats were performed.    FINDINGS:  LOWER CHEST: Bilateral mildly lobulated pleural thickening and calcified   pleural plaques. Reticular parenchymal scarring/atelectasis right lung   base. Cardiomegaly. Pacemaker. TAVR. Atherosclerotic calcification   including the coronary arteries and probable coronary artery stent.   Sternotomy. Wedge resection right lower lobe.    LIVER: Nodular hepatic contour. Correlate for possibility of cirrhosis.   Peripheral calcification posteriorly inferior right lobe.  BILE DUCTS: Normal caliber.  GALLBLADDER: Cholecystectomy.  SPLEEN: Within normal limits.  PANCREAS: Within normal limits.  ADRENALS: Within normal limits.  KIDNEYS/URETERS: No hydronephrosis. Contrast within the renal cortex   bilaterally. Correlate for possibility of contrast nephropathy. Bilateral   focal cortical scarring.    BLADDER: Almost completely collapsed.  REPRODUCTIVE ORGANS: Prostatectomy. Right pelvic reservoir for a penile   prosthesis.    BOWEL: No bowel obstruction. Appendix is not visualized.  PERITONEUM/RETROPERITONEUM: Small volume abdominal and pelvic ascites.  VESSELS: Atherosclerotic changes.  LYMPH NODES: No lymphadenopathy.  ABDOMINAL WALL: Anasarca.  BONES: Degenerative changes.    IMPRESSION:  No evidence of an abscess collection or inflammatory process.  Persistent contrast within the kidneys concerning for contrast   nephropathy.    Additional findings as above.    ACC: [id number] EXAM:  CT CHEST   ORDERED BY: SADIE TANG     PROCEDURE DATE:  09/25/2024          INTERPRETATION:  EXAMINATION: CT CHEST    CLINICAL INDICATION: Dyspnea.    TECHNIQUE: Noncontrast CT of the chest was obtained.    COMPARISON: 6.1.24    FINDINGS:    AIRWAYS AND LUNGS: The central tracheobronchial tree is patent. Right   lung postsurgical changes. Moderate-sized loculated and nodular left   pleural effusion is increased from the prior study. Right-sided pleural   thickening. Small nodular and loculated right pleural effusion is   slightly enlarged.    MEDIASTINUM AND PLEURA: There are no enlarged mediastinal, hilar or   axillary lymph nodes. The visualized portion of the thyroid gland is   unremarkable. There is no pneumothorax.    HEART AND VESSELS: There is mild cardiomegaly.  There are atherosclerotic   calcifications of the aorta and coronary arteries.  There is no   pericardial effusion.  TAVR. Sternotomy and CABG.    UPPER ABDOMEN: Images of the upper abdomen demonstrate cholecystectomy.   Ascites    BONES AND SOFT TISSUES: There are degenerative changes of the spine.  The   soft tissues are unremarkable.    IMPRESSION:  Moderate-sized loculated and nodular left pleural effusion is increased   from the prior study. Right-sided pleural thickening. Small nodular and   loculated right pleural effusion is slightly enlarged.    --- End of Report ---        Impression:  Elderly man admitted from assisted living with h/o dm, ppm, chf now comes in with  alvin, hyperkalemia, altered ms, lactic acidosis, elevated liver enzymes, elevated bili. He has leukocytosis. It is unclear if he is infected. He had recent ct a/p with contrast so may be reactive leukocytosis and altered from alvin due to contrast and high bili and liver enzymes from cardiac induced hepatic congestion. jCould have infection of some source but source is not overtly apparent. Recent ua no puiroa. npo biliary obstruction on us, no pneumonia is apparent.     Recommendations:  for now continue zosyn  f/u cultures  alvin being addressed  would do goc discussions with family  check wnv titer HPI:   Patient is a 89y male with ppm, dm, cad, recent stay for gib, recent shingles,  and recent  ED visit for llq pain, no source seen on ct a/p with contrast. He now returns from assisted living with sob,  altered ms, alvin, lactic acidosis, hyper kalemia, leukocytosis, mild liver enzyme and bili elevation.  I cannot get any information from the patient. He has mild hypothermia but no fever.     REVIEW OF SYSTEMS:  All other review of systems negative (Comprehensive ROS)    PAST MEDICAL & SURGICAL HISTORY:  Pacemaker      CAD (coronary artery disease)      HTN (hypertension)      Diabetes      No significant past surgical history          Allergies    No Known Allergies    Intolerances        Antimicrobials Day #  :1  piperacillin/tazobactam IVPB.. 3.375 Gram(s) IV Intermittent every 12 hours    Other Medications:  acetaminophen     Tablet .. 650 milliGRAM(s) Oral every 6 hours PRN  aluminum hydroxide/magnesium hydroxide/simethicone Suspension 30 milliLiter(s) Oral every 4 hours PRN  AQUAPHOR (petrolatum Ointment) 1 Application(s) Topical daily  atorvastatin 40 milliGRAM(s) Oral at bedtime  clopidogrel Tablet 75 milliGRAM(s) Oral daily  dextrose 5% 1000 milliLiter(s) IV Continuous <Continuous>  dextrose 5%. 1000 milliLiter(s) IV Continuous <Continuous>  dextrose 5%. 1000 milliLiter(s) IV Continuous <Continuous>  dextrose 50% Injectable 25 Gram(s) IV Push once  dextrose 50% Injectable 12.5 Gram(s) IV Push once  dextrose 50% Injectable 25 Gram(s) IV Push once  dextrose Oral Gel 15 Gram(s) Oral once PRN  doxazosin 2 milliGRAM(s) Oral daily  ferrous    sulfate 325 milliGRAM(s) Oral daily  glucagon  Injectable 1 milliGRAM(s) IntraMuscular once  insulin lispro (ADMELOG) corrective regimen sliding scale   SubCutaneous three times a day before meals  insulin lispro (ADMELOG) corrective regimen sliding scale   SubCutaneous at bedtime  latanoprost 0.005% Ophthalmic Solution 1 Drop(s) Both EYES at bedtime  melatonin 3 milliGRAM(s) Oral at bedtime PRN  metoprolol succinate ER 25 milliGRAM(s) Oral daily  multivitamin 1 Tablet(s) Oral daily  ondansetron Injectable 4 milliGRAM(s) IV Push every 8 hours PRN      FAMILY HISTORY:      SOCIAL HISTORY:  Smoking: [ ]Yes [ ]No  ETOH: [ ]Yes [ ]No  Drug Use: [ ]Yes [ ]No  CANNOT GET   [x ] Single[ ]    T(F): 96.9 (09-25-24 @ 17:22), Max: 98 (09-25-24 @ 08:45)  HR: 99 (09-25-24 @ 17:22)  BP: 111/50 (09-25-24 @ 17:22)  RR: 22 (09-25-24 @ 17:22)  SpO2: 98% (09-25-24 @ 17:22)  Wt(kg): --    PHYSICAL EXAM:  General: awake, agitated, pulling on gown, iv etc  Eyes:  anicteric, no conjunctival injection, no discharge  Oropharynx: no lesions or injection 	  Neck: supple, without adenopathy  Lungs: poor cooperation to breaths to auscultation  Heart: regular rate and rhythm; no murmur, rubs or gallops  Abdomen: soft, nondistended, nontender, without mass or organomegaly  Skin: no lesions. scars from shingles on side  Extremities: no clubbing, cyanosis, . legs some edema, skin tears  Neurologic: very confused  moves all extremities  scrotum no swelling  back no ulcers  LAB RESULTS:                        9.8    17.57 )-----------( 209      ( 25 Sep 2024 09:10 )             29.2     09-25    137  |  102  |  117[H]  ----------------------------<  114[H]  4.9   |  16[L]  |  4.41[H]    Ca    7.4[L]      25 Sep 2024 12:20    TPro  6.6  /  Alb  2.5[L]  /  TBili  2.8[H]  /  DBili  x   /  AST  131[H]  /  ALT  73[H]  /  AlkPhos  187[H]  09-25    LIVER FUNCTIONS - ( 25 Sep 2024 09:10 )  Alb: 2.5 g/dL / Pro: 6.6 g/dL / ALK PHOS: 187 U/L / ALT: 73 U/L / AST: 131 U/L / GGT: x           Urinalysis Basic - ( 25 Sep 2024 12:20 )    Color: x / Appearance: x / SG: x / pH: x  Gluc: 114 mg/dL / Ketone: x  / Bili: x / Urobili: x   Blood: x / Protein: x / Nitrite: x   Leuk Esterase: x / RBC: x / WBC x   Sq Epi: x / Non Sq Epi: x / Bacteria: x        MICROBIOLOGY:  RECENT CULTURES:        RADIOLOGY REVIEWED:    < from: US Abdomen Upper Quadrant Right (09.25.24 @ 11:05) >  ACC: [id number] EXAM:  US ABDOMEN RT UPR QUADRANT   ORDERED BY: SADIE TANG     PROCEDURE DATE:  09/25/2024          INTERPRETATION:  CLINICAL INFORMATION: Elevated LFTs    COMPARISON: None available.    TECHNIQUE: Sonography of the right upper quadrant.    FINDINGS:  Liver: Mildly lobulated hepatic contour possible cirrhosis. No focal   abnormality.  Bile ducts: Normal caliber. Common bile duct measures 2 mm.  Gallbladder: Cholecystectomy.  Pancreas: Visualized portions are within normallimits.  Right kidney: 9.2 cm. No hydronephrosis.  Ascites: Trace ascites  IVC: Visualized portions are within normal limits.    IMPRESSION:    Mildly lobulated hepatic contour possible cirrhosis.  No biliary dilatation. No acute finding.  Trace ascites    --- End of Report ---      < end of copied text >      PROCEDURE DATE:  09/25/2024          INTERPRETATION:  CLINICAL INFORMATION: ALVIN, sepsis. Rule out   collection/hydronephrosis.    COMPARISON: 09/21/2024.    CONTRAST/COMPLICATIONS:  IV Contrast: NONE  Oral Contrast: NONE  Complications: None reported at time of study completion    PROCEDURE:  CT of the Abdomen and Pelvis was performed.  Sagittal and coronal reformats were performed.    FINDINGS:  LOWER CHEST: Bilateral mildly lobulated pleural thickening and calcified   pleural plaques. Reticular parenchymal scarring/atelectasis right lung   base. Cardiomegaly. Pacemaker. TAVR. Atherosclerotic calcification   including the coronary arteries and probable coronary artery stent.   Sternotomy. Wedge resection right lower lobe.    LIVER: Nodular hepatic contour. Correlate for possibility of cirrhosis.   Peripheral calcification posteriorly inferior right lobe.  BILE DUCTS: Normal caliber.  GALLBLADDER: Cholecystectomy.  SPLEEN: Within normal limits.  PANCREAS: Within normal limits.  ADRENALS: Within normal limits.  KIDNEYS/URETERS: No hydronephrosis. Contrast within the renal cortex   bilaterally. Correlate for possibility of contrast nephropathy. Bilateral   focal cortical scarring.    BLADDER: Almost completely collapsed.  REPRODUCTIVE ORGANS: Prostatectomy. Right pelvic reservoir for a penile   prosthesis.    BOWEL: No bowel obstruction. Appendix is not visualized.  PERITONEUM/RETROPERITONEUM: Small volume abdominal and pelvic ascites.  VESSELS: Atherosclerotic changes.  LYMPH NODES: No lymphadenopathy.  ABDOMINAL WALL: Anasarca.  BONES: Degenerative changes.    IMPRESSION:  No evidence of an abscess collection or inflammatory process.  Persistent contrast within the kidneys concerning for contrast   nephropathy.    Additional findings as above.    ACC: [id number] EXAM:  CT CHEST   ORDERED BY: SHARIFDAITHYA ANDREW TANG     PROCEDURE DATE:  09/25/2024          INTERPRETATION:  EXAMINATION: CT CHEST    CLINICAL INDICATION: Dyspnea.    TECHNIQUE: Noncontrast CT of the chest was obtained.    COMPARISON: 6.1.24    FINDINGS:    AIRWAYS AND LUNGS: The central tracheobronchial tree is patent. Right   lung postsurgical changes. Moderate-sized loculated and nodular left   pleural effusion is increased from the prior study. Right-sided pleural   thickening. Small nodular and loculated right pleural effusion is   slightly enlarged.    MEDIASTINUM AND PLEURA: There are no enlarged mediastinal, hilar or   axillary lymph nodes. The visualized portion of the thyroid gland is   unremarkable. There is no pneumothorax.    HEART AND VESSELS: There is mild cardiomegaly.  There are atherosclerotic   calcifications of the aorta and coronary arteries.  There is no   pericardial effusion.  TAVR. Sternotomy and CABG.    UPPER ABDOMEN: Images of the upper abdomen demonstrate cholecystectomy.   Ascites    BONES AND SOFT TISSUES: There are degenerative changes of the spine.  The   soft tissues are unremarkable.    IMPRESSION:  Moderate-sized loculated and nodular left pleural effusion is increased   from the prior study. Right-sided pleural thickening. Small nodular and   loculated right pleural effusion is slightly enlarged.    --- End of Report ---        Impression:  Elderly man admitted from assisted living with h/o dm, ppm, chf, gib, recent shingles,  now comes in with  alvin, hyperkalemia, altered ms, lactic acidosis, elevated liver enzymes, elevated bili. He has leukocytosis. It is unclear if he is infected. He had recent ct a/p with contrast so may be reactive leukocytosis and altered from alvin due to contrast and high bili and liver enzymes from cardiac induced hepatic congestion. jCould have infection of some source but source is not overtly apparent. Recent ua no puiroa. npo biliary obstruction on us, no pneumonia is apparent.     Recommendations:  for now continue zosyn  f/u cultures  alvin being addressed  would do goc discussions with family  check wnv titer

## 2024-09-25 NOTE — H&P ADULT - NSHPLABSRESULTS_GEN_ALL_CORE
9.8    17.57 )-----------( 209      ( 25 Sep 2024 09:10 )             29.2       09-25    137  |  102  |  117[H]  ----------------------------<  114[H]  4.9   |  16[L]  |  4.41[H]    Ca    7.4[L]      25 Sep 2024 12:20    TPro  6.6  /  Alb  2.5[L]  /  TBili  2.8[H]  /  DBili  x   /  AST  131[H]  /  ALT  73[H]  /  AlkPhos  187[H]  09-25          Urinalysis Basic - ( 25 Sep 2024 12:20 )    Color: x / Appearance: x / SG: x / pH: x  Gluc: 114 mg/dL / Ketone: x  / Bili: x / Urobili: x   Blood: x / Protein: x / Nitrite: x   Leuk Esterase: x / RBC: x / WBC x   Sq Epi: x / Non Sq Epi: x / Bacteria: x      PT/INR - ( 25 Sep 2024 09:54 )   PT: 38.6 sec;   INR: 3.31 ratio         PTT - ( 25 Sep 2024 09:54 )  PTT:36.5 sec    Lactate Trend  09-25 @ 12:20 Lactate:4.3   09-25 @ 09:54 Lactate:4.5   09-21 @ 12:55 Lactate:2.2       Urinalysis with Rflx Culture (collected 09-21-24 @ 13:30)      Labs reviewed:       ECG reviewed and interpreted: paced 136    CT Abdomen and Pelvis No Cont (09.25.24 @ 11:03) >No evidence of an abscess collection or inflammatory process. Persistent contrast within the kidneys concerning for contrast nephropathy.  BILE DUCTS: Normal caliber. GALLBLADDER: Cholecystectomy.   CT Chest No Cont (09.25.24 @ 09:34) >IMPRESSION: Moderate-sized loculated and nodular left pleural effusion is increased from the prior study. Right-sided pleural thickening. Small nodular and loculated right pleural effusion is slightly enlarged

## 2024-09-25 NOTE — ED ADULT TRIAGE NOTE - CHIEF COMPLAINT QUOTE
Pt brought in by ambulance from [place] for difficulty breathing. As per ems, staff gave pt one duoneb.

## 2024-09-25 NOTE — ED ADULT NURSE NOTE - OBJECTIVE STATEMENT
Pt was sent from [place] for difficulty breathing this morning. As per EMS, O2 sat was in the 80's on room air and was placed on NRB. Pt's o2 sat improved in triage on room air to 97%. Pt awake, alert and calm on arrival, no apparent respiratory distress, pt not speaking much when asked history. Pt presents with healing left forearm skin tears from prior falls.

## 2024-09-25 NOTE — ED ADULT NURSE NOTE - NSFALLHARMRISKINTERV_ED_ALL_ED
Assistance OOB with selected safe patient handling equipment if applicable/Assistance with ambulation/Communicate risk of Fall with Harm to all staff, patient, and family/Monitor gait and stability/Provide visual cue: red socks, yellow wristband, yellow gown, etc/Reinforce activity limits and safety measures with patient and family/Bed in lowest position, wheels locked, appropriate side rails in place/Call bell, personal items and telephone in reach/Instruct patient to call for assistance before getting out of bed/chair/stretcher/Non-slip footwear applied when patient is off stretcher/[place] to call system/Physically safe environment - no spills, clutter or unnecessary equipment/Purposeful Proactive Rounding/Room/bathroom lighting operational, light cord in reach

## 2024-09-25 NOTE — PATIENT PROFILE ADULT - FALL HARM RISK - HARM RISK INTERVENTIONS

## 2024-09-25 NOTE — DISCHARGE NOTE FOR THE EXPIRED PATIENT - HOSPITAL COURSE
89-year-old male with PMHx HTN, CAD s/p CABG and PCI on Plavix, A fib on Xarelto, PPM, PVD, T2DM, essential tremor, hx prostate cancer, presenting via EMS from assisted living with reported difficulty breathing, patient was reportedly given DuoNeb by staff, patient without reported fever or chills, denies any cough or congestion, denies any chest pain or shortness of breath, no reported abdominal pain.  No other reported complaints.  Was recently seen in the ED for fall.    pt unable to provide history, confused, per daughter pts mental status has been declining since jun/24  when he was admitted for GI bleed.   more recently he had shingles 1 month back and was isolated at assisted living which further declined his functional staus, he is able to stand with help but moslty in w/c now.  he was noted to be SOB at assisted living and was sent to ED, per DTR Diana pt always has baseline SOB due to h/o chf.  pt is DNR/DNI.    ED COURSE- WBC Count: 17.57 K/uL (09.25.24 @ 09:10). Lactate, Blood: 4.3 mmol/L (09.25.24 @ 12:20), Creatinine: 4.41 mg/dL (09.25.24 @ 12:20), Pro-Brain Natriuretic Peptide: 6373 pg/mL (09.21.24 @ 12:55)  Potassium: 7.0 mmol/L (09.25.24 @ 09:10) improved to 4.9 with treatment   received 3.3 L NS. BP initially low in 90's improved to 120's with fluids   sats in 90's.  EKG- tacy 136 paced, later HR improved to 70's,   CT Abdomen and Pelvis No Cont (09.25.24 @ 11:03) >No evidence of an abscess collection or inflammatory process.Persistent contrast within the kidneys concerning for contrast nephropathy.  BILE DUCTS: Normal caliber.GALLBLADDER: Cholecystectomy.   CT Chest No Cont (09.25.24 @ 09:34) >IMPRESSION:Moderate-sized loculated and nodular left pleural effusion is increased from the prior study. Right-sided pleural thickening. Small nodular and loculated right pleural effusion is slightly enlarged    seen by ICU and renal in ED- recc noted.     pt was noted unresponsive, RRT was called , pt was pronounced dead at 6.54 pm  dtr RICO, notified she is at bedside.

## 2024-09-25 NOTE — H&P ADULT - ASSESSMENT
89-year-old male with PMHx HTN, CAD s/p CABG and PCI on Plavix, A fib on Xarelto, PPM, PVD, T2DM, essential tremor, hx prostate cancer, presenting via EMS from assisted living with reported difficulty breathing, patient was reportedly given DuoNeb by staff, patient without reported fever or chills, denies any cough or congestion, denies any chest pain or shortness of breath, no reported abdominal pain.  No other reported complaints.  Was recently seen in the ED for fall.    pt unable to provide history, confused, per daughter pts mental stsus has been declining sicne jun/24  when he was admitted for GI bleed.   more recently he had shingles 1 month back and was isolated at assisted living which further declined his functional staus, he is able to stand with help but moslty in w/c now.  he was noted to be SOB at assisted living and was sent to ED, per DTR pt always has baseline SOB due to h/o chf.  pt is DNR/DNI.    ED COURSE- WBC Count: 17.57 K/uL (09.25.24 @ 09:10). Lactate, Blood: 4.3 mmol/L (09.25.24 @ 12:20), Creatinine: 4.41 mg/dL (09.25.24 @ 12:20), Pro-Brain Natriuretic Peptide: 6373 pg/mL (09.21.24 @ 12:55)  Potassium: 7.0 mmol/L (09.25.24 @ 09:10) improved to 4.9 with treatment   received 3.3 L NS. BP initially low in 90's improved to 120's with fluids   sats in 90's.  EKG- tacy 136 paced, later HR improved to 70's,   CT Abdomen and Pelvis No Cont (09.25.24 @ 11:03) >No evidence of an abscess collection or inflammatory process.Persistent contrast within the kidneys concerning for contrast nephropathy.  BILE DUCTS: Normal caliber.GALLBLADDER: Cholecystectomy.   CT Chest No Cont (09.25.24 @ 09:34) >IMPRESSION:Moderate-sized loculated and nodular left pleural effusion is increased from the prior study. Right-sided pleural thickening. Small nodular and loculated right pleural effusion is slightly enlarged    seen by ICU and renal in ED- recc noted.     # SOB, meeting criteria for septic shock ( leukocytosis, tachy, lactic acidosis) r/o PNA  # acute renal failure, hyperkalemia likely contrast nephropathy  - admit to tele  - ICU and renal recc noted  - iv fluids- per renal  - BCX2, UC/S sent  - K improved after treatment  - am labs  - asp and fall precautions  - will order zosyn   - diuretics  - ID consult  - pulm consult    # Hyperkalemia- improved   - s/p  insulin  and dextrose, Lokelma ivf  - f/u AM BMP  - renal following     #Chronic A fib, HTN, PPM  - renally dosed Eliquis  - c/w metoprolol, intermittent tachy  - cardio consult     # T2DM  -  ISS, Accu-Cheks  - monitor BS, goal < 180    # anemia-   - chronic no active bleeding  - monitor  - feso4    #CAD  # PPM  - c/w metoprolol with close observation  - continue Plavix,  Eliquis , statin    #Chronic A fib  - renally dosed Eliquis  - HR controlled   - c/w metoprolol    #HFrEF  - TTE Echo Complete w/o Contrast w/ Doppler (06.03.24 @ 09:42) >moderately reduced LV systolic function in limited views, estimated visual LVEF  40%. Abnormal septal motion likely dueto paced rhythm  - repeat TTE in am   - not a good candidate for Farxiga or spironolactone secondary to poor renal function  - bb, diuretics    # BPH- doxazosin      #VTE PPx  - Eliquis     #Code status:  DNR/DNI   89-year-old male with PMHx HTN, CAD s/p CABG and PCI on Plavix, A fib on Xarelto, PPM, PVD, T2DM, essential tremor, hx prostate cancer, presenting via EMS from assisted living with reported difficulty breathing, patient was reportedly given DuoNeb by staff, patient without reported fever or chills, denies any cough or congestion, denies any chest pain or shortness of breath, no reported abdominal pain.  No other reported complaints.  Was recently seen in the ED for fall.    pt unable to provide history, confused, per daughter pts mental stsus has been declining sicne jun/24  when he was admitted for GI bleed.   more recently he had shingles 1 month back and was isolated at assisted living which further declined his functional staus, he is able to stand with help but moslty in w/c now.  he was noted to be SOB at assisted living and was sent to ED, per DTR pt always has baseline SOB due to h/o chf.  pt is DNR/DNI.    ED COURSE- WBC Count: 17.57 K/uL (09.25.24 @ 09:10). Lactate, Blood: 4.3 mmol/L (09.25.24 @ 12:20), Creatinine: 4.41 mg/dL (09.25.24 @ 12:20), Pro-Brain Natriuretic Peptide: 6373 pg/mL (09.21.24 @ 12:55)  Potassium: 7.0 mmol/L (09.25.24 @ 09:10) improved to 4.9 with treatment   received 3.3 L NS. BP initially low in 90's improved to 120's with fluids   sats in 90's.  EKG- tacy 136 paced, later HR improved to 70's,   CT Abdomen and Pelvis No Cont (09.25.24 @ 11:03) >No evidence of an abscess collection or inflammatory process.Persistent contrast within the kidneys concerning for contrast nephropathy.  BILE DUCTS: Normal caliber.GALLBLADDER: Cholecystectomy.   CT Chest No Cont (09.25.24 @ 09:34) >IMPRESSION:Moderate-sized loculated and nodular left pleural effusion is increased from the prior study. Right-sided pleural thickening. Small nodular and loculated right pleural effusion is slightly enlarged    seen by ICU and renal in ED- recc noted.     # SOB, meeting criteria for septic shock ( leukocytosis, tachy, lactic acidosis) r/o PNA  # acute renal failure, hyperkalemia likely contrast nephropathy  - admit to tele  - ICU and renal recc noted  - iv fluids- per renal  - BCX2, UC/S sent  - K improved after treatment  - am labs  - asp and fall precautions  - will order zosyn   - diuretics- per renal and pulm   - ID consult  - pulm consult    # hyponatremia and Hyperkalemia- improved   - s/p  insulin  and dextrose, Lokelma ivf  - f/u AM BMP  - renal following     # elevated INR-   - INR: 3.31 ratio    - monitor labs  - hb stable  - no signs of bleeding  - will order fobt    #Chronic A fib, HTN, PPM  - renally dosed Eliquis  - c/w metoprolol, intermittent tachy  - cardio consult     # T2DM  -  ISS, Accu-Cheks  - monitor BS, goal < 180    # anemia-   - chronic no active bleeding  - monitor  - feso4    #CAD  # PPM  - c/w metoprolol with close observation  - continue Plavix,  Eliquis , statin    #Chronic A fib  - renally dosed Eliquis  - HR controlled   - c/w metoprolol    #HFrEF  - TTE Echo Complete w/o Contrast w/ Doppler (06.03.24 @ 09:42) >moderately reduced LV systolic function in limited views, estimated visual LVEF  40%. Abnormal septal motion likely dueto paced rhythm  - repeat TTE in am   - not a good candidate for Farxiga or spironolactone secondary to poor renal function  - bb, diuretics    # BPH- doxazosin      #VTE PPx  - Eliquis     #Code status:  DNR/DNI   89-year-old male with PMHx HTN, CAD s/p CABG and PCI on Plavix, A fib on Xarelto, PPM, PVD, T2DM, essential tremor, hx prostate cancer, presenting via EMS from assisted living with reported difficulty breathing, patient was reportedly given DuoNeb by staff, patient without reported fever or chills, denies any cough or congestion, denies any chest pain or shortness of breath, no reported abdominal pain.  No other reported complaints.  Was recently seen in the ED for fall.    pt unable to provide history, confused, per daughter pts mental stsus has been declining sicne jun/24  when he was admitted for GI bleed.   more recently he had shingles 1 month back and was isolated at assisted living which further declined his functional staus, he is able to stand with help but moslty in w/c now.  he was noted to be SOB at assisted living and was sent to ED, per DTR pt always has baseline SOB due to h/o chf.  pt is DNR/DNI.    ED COURSE- WBC Count: 17.57 K/uL (09.25.24 @ 09:10). Lactate, Blood: 4.3 mmol/L (09.25.24 @ 12:20), Creatinine: 4.41 mg/dL (09.25.24 @ 12:20), Pro-Brain Natriuretic Peptide: 6373 pg/mL (09.21.24 @ 12:55)  Potassium: 7.0 mmol/L (09.25.24 @ 09:10) improved to 4.9 with treatment   received 3.3 L NS. BP initially low in 90's improved to 120's with fluids   sats in 90's.  EKG- tacy 136 paced, later HR improved to 70's,   CT Abdomen and Pelvis No Cont (09.25.24 @ 11:03) >No evidence of an abscess collection or inflammatory process.Persistent contrast within the kidneys concerning for contrast nephropathy.  BILE DUCTS: Normal caliber.GALLBLADDER: Cholecystectomy.   CT Chest No Cont (09.25.24 @ 09:34) >IMPRESSION:Moderate-sized loculated and nodular left pleural effusion is increased from the prior study. Right-sided pleural thickening. Small nodular and loculated right pleural effusion is slightly enlarged    seen by ICU and renal in ED- recc noted.     # SOB, meeting criteria for septic shock ( leukocytosis, tachy, lactic acidosis) r/o PNA  # acute renal failure, hyperkalemia likely contrast nephropathy  - admit to tele  - ICU and renal recc noted  - iv fluids- per renal  - BCX2, UC/S sent  - K improved after treatment  - am labs  - asp and fall precautions  - will order zosyn   - diuretics- per renal and pulm   - ID consult  - pulm consult    # hyponatremia and Hyperkalemia- improved   - s/p  insulin  and dextrose, Lokelma ivf  - f/u AM BMP  - renal following     # elevated INR-   - INR: 3.31 ratio    - monitor labs  - hb stable  - no signs of bleeding  - will order fobt  - will hold Eliquis for now, follow INR    #Chronic A fib, HTN, PPM  - renally dosed Eliquis  - c/w metoprolol, intermittent tachy  - cardio consult     # T2DM  -  ISS, Accu-Cheks  - monitor BS, goal < 180    # anemia-   - chronic no active bleeding  - monitor  - feso4    #CAD  # PPM  - c/w metoprolol with close observation  - continue Plavix,  Eliquis , statin    #Chronic A fib  - renally dosed Eliquis  - HR controlled   - c/w metoprolol    #HFrEF  - TTE Echo Complete w/o Contrast w/ Doppler (06.03.24 @ 09:42) >moderately reduced LV systolic function in limited views, estimated visual LVEF  40%. Abnormal septal motion likely dueto paced rhythm  - repeat TTE in am   - not a good candidate for Farxiga or spironolactone secondary to poor renal function  - bb, diuretics    # BPH- doxazosin      #VTE PPx  - will hold Eliquis- due to elevated INR, risk of bleeding.      #Code status:  DNR/DNI

## 2024-09-25 NOTE — CONSULT NOTE ADULT - ASSESSMENT
89 y/o M with PMH presented to ED for SOB,       Assessment:  hyperkalemia  ALVIN on CKD, could be contrast induced nephropathy vs dehydration?  sepsis secondary to pneumonia  loculated pleural effusion       Plan:  code status confirmed with daughter by Dr Mcdowell, pt is DNR/ DNI  patient HD stable after fluids, appears clinically dry  suspect hypotension likely from improper cuff size  use appropriate BP cuff size ( light blue cuff)  hyperkalemia improved, 7> 4.9 after hyperK protocol and fluids  Creatinine improving, lactic downtrending  appreicate nephrology recs  no need for urgent dialysis  agree with jade for strict i & O   consider palliative care consult      Patient is hemodynamically stable. No need for ICU level of care at this time.  Admit to tele.     Patient seen and evaluated with Dr Escamilla who agrees with above stated plan of care   89 y/o M with PMH presented to ED for SOB,       Assessment:  hyperkalemia  ALVIN on CKD, could be contrast induced nephropathy vs dehydration?  severe sepsis/septic shock - lactic acidosis  unsure of primary source,   loculated pleural effusion - chronic and increasing       Plan:  code status confirmed with daughter by  Mcdowell, pt is DNR/ DNI  patient HD stable after fluids, appears clinically dry  Continue IVF  suspect hypotension likely from improper cuff size and hypovolemia   use appropriate BP cuff size ( light blue cuff)  hyperkalemia improved, 7> 4.9 after hyperK protocol and fluids  Creatinine improving, lactic downtrending  Hold diuretics  appreciate nephrology recs  no need for urgent dialysis  strict I&O  Emperic antibiotics  f/u cultures   monitor pl effusion - no acute intervention   consider palliative care consult to be considered      Patient is hemodynamically stable. No need for ICU level of care at this time.  Admit to tele.     Patient seen and evaluated with Dr Escamilla who agrees with above stated plan of care

## 2024-09-25 NOTE — ED PROVIDER NOTE - CRITICAL CARE ATTENDING CONTRIBUTION TO CARE
89-year-old male with noted leukocytosis elevated lactate and episode of hypotension on presentation, sepsis order set utilized 30 cc/kg patient with noted ALVIN, likely contrast-induced nephropathy as still multiple contrast 4 days post CAT scans, patient with minimal urine output state despite fluid resuscitation, discussed with ICU team Dr. Escamilla, patient is currently improved in terms of hemodynamics after fluid, can be admitted to telemetry on floor, discussed with hospitalist Dr. Mcdowell

## 2024-09-25 NOTE — ED ADULT NURSE NOTE - ISAR MEDICATION
metroNIDAZOLE (METROCREAM) 0.75 % external cream 45 g 11 6/19/2020  ----------------------  Last Office Visit : 10/6/2023  [facility]     Eliz Nguyen MD     Future Office visit:    4/5/2024 Status: [facility]    Arrive By: 11:15 AM     Appointment Time: 11:30 AM Length: 30   Visit Type: P RETURN [[id number]] SCOTT:     Provider: Eliz Nguyen MD Department: [facility]     ----------------------      Routing refill request to provider for review/approval because:  Last Rx from 2020, verifying ok to refill?        Pass/Fail Protocol Criteria:  Topical Acne Medications Protocol [id number]/04/2024 02:16 PM   Protocol Details Patient is 12 years of age or older    Recent (12 mo) or future (30 days) visit within the authorizing provider's specialty    Medication is active on med list      Yes

## 2024-09-25 NOTE — PATIENT PROFILE ADULT - FUNCTIONAL ASSESSMENT - BASIC MOBILITY 6.
3-calculated by average/Not able to assess (calculate score using [facility] averaging method)

## 2024-09-25 NOTE — ED PROVIDER NOTE - PHYSICAL EXAMINATION
VITAL SIGNS: I have reviewed nursing notes and confirm.  CONSTITUTIONAL: well-appearing, non-toxic, NAD  SKIN: Warm dry, normal skin turgor  HEAD: NCAT  CARD: RRR, no murmurs, rubs or gallops  RESP: clear to ausculation b/l.  No rales, rhonchi, or wheezing.  ABD: soft, + BS, non-tender, non-distended, no rebound or guarding.   EXT: no pedal edema   NEURO: normal motor. normal sensory. no facial droop, no slurred speech   PSYCH: Cooperative, appropriate.

## 2024-09-25 NOTE — CONSULT NOTE ADULT - SUBJECTIVE AND OBJECTIVE BOX
NEPHROLOGY CONSULTATION    CHIEF COMPLAINT: not feeling well    HPI:  Pt is 89 yo M with PMH HTN, CAD, pacemaker, PVD, T2 DM, Afib, CABG, CKD 3, essential tremor, prostate cancer, presenting for not feeling well. Seen in ED, on RA, poor historian, No N/V, diarrhea, chest pain, abdominal pain, headache. Noted to have abnormal renal fx.    ROS:  as above    Allergies:  No Known Allergies    PAST MEDICAL & SURGICAL HISTORY:  Pacemaker  CAD (coronary artery disease)  HTN (hypertension)  Diabetes  CABG  Afib   prostate ca  CKD 3  PVD    SOCIAL HISTORY:  negative    FAMILY HISTORY:  NC    Vital Signs Last 24 Hrs  T(C): 35.4 (09-25-24 @ 09:02), Max: 36.7 (09-25-24 @ 08:45)  T(F): 95.7 (09-25-24 @ 09:02), Max: 98 (09-25-24 @ 08:45)  HR: 81 (09-25-24 @ 11:00) (70 - 93)  BP: 92/79 (09-25-24 @ 11:00) (92/79 - 129/64)  BP(mean): 85 (09-25-24 @ 11:00) (74 - 95)  RR: 17 (09-25-24 @ 11:00) (17 - 22)  SpO2: 98% (09-25-24 @ 11:00) (97% - 100%)    s1s2  b/l air entry  soft, ND, NT  chronic stasis changes, small edema b/l LE       LABS:                        9.8    17.57 )-----------( 209      ( 25 Sep 2024 09:10 )             29.2     09-25    131[L]  |  92[L]  |  130[H]  ----------------------------<  158[H]  7.0[HH]   |  18[L]  |  4.94[H]    Ca    9.0      25 Sep 2024 09:10    TPro  6.6  /  Alb  2.5[L]  /  TBili  2.8[H]  /  DBili  x   /  AST  131[H]  /  ALT  73[H]  /  AlkPhos  187[H]  09-25    LIVER FUNCTIONS - ( 25 Sep 2024 09:10 )  Alb: 2.5 g/dL / Pro: 6.6 g/dL / ALK PHOS: 187 U/L / ALT: 73 U/L / AST: 131 U/L / GGT: x           PT/INR - ( 25 Sep 2024 09:54 )   PT: 38.6 sec;   INR: 3.31 ratio       PTT - ( 25 Sep 2024 09:54 )  PTT:36.5 sec    A/P:           NEPHROLOGY CONSULTATION    CHIEF COMPLAINT: not feeling well    HPI:  Pt is 89 yo M with PMH HTN, CAD, pacemaker, PVD, T2 DM, Afib, CABG, CKD 3, essential tremor, prostate cancer, presenting for not feeling well. Seen in ED, on RA, poor historian. No N/V, diarrhea, chest pain, abdominal pain, headache. Noted to have severe ALVIN w/hyperkalemia, elevated WBC count, elevated LFTs, lactic acidosis. Of not pt seen in ED 8/26/24 w/LE edema, chronic, d/c-d back to [facility]. Seen in ED again 9/21    ROS:  as above    Allergies:  No Known Allergies    PAST MEDICAL & SURGICAL HISTORY:  Pacemaker  CAD (coronary artery disease)  HTN (hypertension)  Diabetes  CABG  Afib   prostate ca  CKD 3  PVD    SOCIAL HISTORY:  negative    FAMILY HISTORY:  NC    Vital Signs Last 24 Hrs  T(C): 35.4 (09-25-24 @ 09:02), Max: 36.7 (09-25-24 @ 08:45)  T(F): 95.7 (09-25-24 @ 09:02), Max: 98 (09-25-24 @ 08:45)  HR: 81 (09-25-24 @ 11:00) (70 - 93)  BP: 92/79 (09-25-24 @ 11:00) (92/79 - 129/64)  BP(mean): 85 (09-25-24 @ 11:00) (74 - 95)  RR: 17 (09-25-24 @ 11:00) (17 - 22)  SpO2: 98% (09-25-24 @ 11:00) (97% - 100%)    s1s2  b/l air entry  soft, ND, NT  chronic stasis changes, small edema b/l LE       LABS:                        9.8    17.57 )-----------( 209      ( 25 Sep 2024 09:10 )             29.2     09-25    131[L]  |  92[L]  |  130[H]  ----------------------------<  158[H]  7.0[HH]   |  18[L]  |  4.94[H]    Ca    9.0      25 Sep 2024 09:10    TPro  6.6  /  Alb  2.5[L]  /  TBili  2.8[H]  /  DBili  x   /  AST  131[H]  /  ALT  73[H]  /  AlkPhos  187[H]  09-25    LIVER FUNCTIONS - ( 25 Sep 2024 09:10 )  Alb: 2.5 g/dL / Pro: 6.6 g/dL / ALK PHOS: 187 U/L / ALT: 73 U/L / AST: 131 U/L / GGT: x           PT/INR - ( 25 Sep 2024 09:54 )   PT: 38.6 sec;   INR: 3.31 ratio       PTT - ( 25 Sep 2024 09:54 )  PTT:36.5 sec    A/P:           NEPHROLOGY CONSULTATION    CHIEF COMPLAINT: not feeling well    HPI:  Pt is 89 yo M with PMH HTN, CAD, pacemaker, PVD, T2 DM, Afib, CABG, CKD 3, essential tremor, prostate cancer, presented to ED today for not feeling well. Seen in ED, on RA, poor historian. Denies N/V, diarrhea, chest pain, abdominal pain, headache. No F/C. Of note pt seen in ED 8/26/24 w/LE edema, chronic, d/c-d back to [facility]. Seen in ED again 9/21 w/LLQ pain, pain resolved, CT A/P w/IV dye on 9/21 w/o acute findings, UA negative d/c-d. Seen again in ED 9/23 s/p fall, x rays negative, d/c-d. Returned today. Noted to have severe ALVIN w/hyperkalemia, elevated WBC count, elevated LFTs, lactic acidosis. Was on Torsemide and KCL as op.    ROS:  as above    Allergies:  No Known Allergies    PAST MEDICAL & SURGICAL HISTORY:  Pacemaker  CAD (coronary artery disease)  HTN (hypertension)  Diabetes  CABG  Afib   prostate ca  CKD 3  PVD    SOCIAL HISTORY:  negative    FAMILY HISTORY:  NC    Home Medications:  atorvastatin 80 mg oral tablet: 0.5 tab(s) orally once a day (at bedtime) (25 Sep 2024 09:42)  doxazosin 2 mg oral tablet: 1 tab(s) orally once a day (25 Sep 2024 09:42)  ipratropium-albuterol 0.5 mg-2.5 mg/3 mL inhalation solution: 3 milliliter(s) inhaled every 6 hours (25 Sep 2024 09:42)  latanoprost 0.005% ophthalmic solution: 1 drop(s) in each eye once a day (at bedtime) (25 Sep 2024 09:42)  metroNIDAZOLE 500 mg oral tablet: 1 tab(s) orally 3 times a day (25 Sep 2024 09:42)  Multiple Vitamins oral tablet: 1 tab(s) orally once a day (25 Sep 2024 09:42)  Plavix 75 mg oral tablet: 1 tab(s) orally once a day (25 Sep 2024 09:42)  potassium chloride 10 mEq oral capsule, extended release: 2 cap(s) orally once a day (25 Sep 2024 09:42)  PreserVision AREDS 2 oral capsule: 1 cap(s) orally once a day (25 Sep 2024 09:42)  torsemide 20 mg oral tablet: 1 tab(s) orally once a day (at bedtime) (25 Sep 2024 09:42)    Vital Signs Last 24 Hrs  T(C): 35.4 (09-25-24 @ 09:02), Max: 36.7 (09-25-24 @ 08:45)  T(F): 95.7 (09-25-24 @ 09:02), Max: 98 (09-25-24 @ 08:45)  HR: 81 (09-25-24 @ 11:00) (70 - 93)  BP: 92/79 (09-25-24 @ 11:00) (92/79 - 129/64)  BP(mean): 85 (09-25-24 @ 11:00) (74 - 95)  RR: 17 (09-25-24 @ 11:00) (17 - 22)  SpO2: 98% (09-25-24 @ 11:00) (97% - 100%)    s1s2  b/l air entry  soft, ND, NT  chronic stasis changes, small edema b/l LE     LABS:                        9.8    17.57 )-----------( 209      ( 25 Sep 2024 09:10 )             29.2     09-25    131[L]  |  92[L]  |  130[H]  ----------------------------<  158[H]  7.0[HH]   |  18[L]  |  4.94[H]    Ca    9.0      25 Sep 2024 09:10    TPro  6.6  /  Alb  2.5[L]  /  TBili  2.8[H]  /  DBili  x   /  AST  131[H]  /  ALT  73[H]  /  AlkPhos  187[H]  09-25    LIVER FUNCTIONS - ( 25 Sep 2024 09:10 )  Alb: 2.5 g/dL / Pro: 6.6 g/dL / ALK PHOS: 187 U/L / ALT: 73 U/L / AST: 131 U/L / GGT: x           PT/INR - ( 25 Sep 2024 09:54 )   PT: 38.6 sec;   INR: 3.31 ratio       PTT - ( 25 Sep 2024 09:54 )  PTT:36.5 sec    A/P:    S/p CT w/IV dye 9/21/24  Presenting today not feeling well, noted to have severe ALVIN w/hyperkalemia, elevated WBC count, elevated LFTs, lactic acidosis  Contrast/hemodynamic/septic ALVIN/CKD 3 (Cr 1.54 - 9/21/24)  Repeat imaging w/o hydro  Pan-cx, check UA  Empiric Abx   Repeat BMP  Enamorado, I/Os  Gentle IVF  Renal diet   Lokelma as ordered   Hoping to avoid the need for RRT  Goc d/w family would be most appropriate   D/w medicine and ICU team   Will follow     [phone]

## 2024-09-25 NOTE — ED ADULT NURSE REASSESSMENT NOTE - NS ED NURSE REASSESS COMMENT FT1
Pt pulled out his IV causing skin tear from tegaderm in left AC area, pt cleaned and new IV placed in right AC 20g.

## 2024-09-25 NOTE — ED ADULT NURSE NOTE - CHIEF COMPLAINT QUOTE
Pt brought in by ambulance from [place] for difficulty breathing. As per ems, staff gave pt one duoneb.

## 2024-09-25 NOTE — CONSULT NOTE ADULT - SUBJECTIVE AND OBJECTIVE BOX
ICU CONSULT  [facility] of Patient : [facility] ([facility]  Attending requesting Consult:Maryanne Mcdowell  Chief Complaint : SOB    Reason For consult : hyperkalemia, curly, sepsis      Initial HPI on admission:  HPI:  89-year-old male with PMHx HTN, CAD s/p CABG and PCI on Plavix, A fib on Xarelto, PPM, PVD, T2DM, essential tremor, hx prostate cancer, presenting via EMS from assisted living with reported difficulty breathing, patient was reportedly given DuoNeb by staff, patient without reported fever or chills, denies any cough or congestion, denies any chest pain or shortness of breath, no reported abdominal pain.  No other reported complaints.  Was recently seen in the ED for fall.    pt unable to provide history, confused, per daughter pts mental stsus has been declining sicne jun/24  when he was admitted for GI bleed.   more recently he had shingles 1 month back and was isolated at assisted living which further declined his functional staus, he is able to stand with help but moslty in w/c now.  he was noted to be SOB at assisted living and was sent to ED, per DTR pt always has baseline SOB due to h/o chf.  pt is DNR/DNI.        BRIEF HOSPITAL COURSE:  Labs significant for Creatinine 4.9m baseline 1.8 just 2 days prior, potassium 7, WBC 17.5, Lactic 4.5, pt was hypotensive in rhe ED but responded to fluids     PAST MEDICAL & SURGICAL HISTORY:  Pacemaker  CAD (coronary artery disease)  HTN (hypertension)  Diabetes      No significant past surgical history        Allergies    No Known Allergies    Intolerances      FAMILY HISTORY:    Social history: Social History:         Smoking: unknown     Drinking: denies      Drug use: denies     Review of Systems: unable to give history, pt confused and poor historian       Medications:  MEDICATIONS  (STANDING):  apixaban 2.5 milliGRAM(s) Oral two times a day  atorvastatin 40 milliGRAM(s) Oral at bedtime  clopidogrel Tablet 75 milliGRAM(s) Oral daily  dextrose 5% 1000 milliLiter(s) (50 mL/Hr) IV Continuous <Continuous>  doxazosin 2 milliGRAM(s) Oral daily  ferrous    sulfate 325 milliGRAM(s) Oral daily  latanoprost 0.005% Ophthalmic Solution 1 Drop(s) Both EYES at bedtime  metoprolol succinate ER 25 milliGRAM(s) Oral daily  multivitamin 1 Tablet(s) Oral daily    MEDICATIONS  (PRN):      Antibiotics History  azithromycin  IVPB 500 milliGRAM(s) IV Intermittent once, 09-25-24 @ 09:35, Stop order after: 1 Doses  cefTRIAXone   IVPB 1000 milliGRAM(s) IV Intermittent once, 09-25-24 @ 09:35, Stop order after: 1 Doses      Heme Medications   apixaban 2.5 milliGRAM(s) Oral two times a day, 09-25-24 @ 13:52  clopidogrel Tablet 75 milliGRAM(s) Oral daily, 09-25-24 @ 13:52      Home Medications:  Last Order Reconciliation Date: 09-25-24 (Admission Reconciliation)  Aldactone 25 mg oral tablet: 1 tab(s) orally once a day (09-25-24)  atorvastatin 80 mg oral tablet: 0.5 tab(s) orally once a day (at bedtime) (09-25-24)  doxazosin 2 mg oral tablet: 1 tab(s) orally once a day (09-25-24)  Eliquis 2.5 mg oral tablet: 1 tab(s) orally 2 times a day (09-25-24)  FeroSul 325 mg (65 mg elemental iron) oral tablet: 1 tab(s) orally once a day (09-25-24)  ipratropium-albuterol 0.5 mg-2.5 mg/3 mL inhalation solution: 3 milliliter(s) inhaled every 6 hours (09-25-24)  latanoprost 0.005% ophthalmic solution: 1 drop(s) in each eye once a day (at bedtime) (09-25-24)  metOLazone 2.5 mg oral tablet: 1 tab(s) orally 2 times a week (09-25-24)  metoprolol succinate 25 mg oral capsule, extended release: 1 cap(s) orally once a day (09-25-24)  Multiple Vitamins oral tablet: 1 tab(s) orally once a day (09-25-24)  Plavix 75 mg oral tablet: 1 tab(s) orally once a day (09-25-24)  potassium chloride 10 mEq oral capsule, extended release: 2 cap(s) orally once a day (09-25-24)  PreserVision AREDS 2 oral capsule: 1 cap(s) orally once a day (09-25-24)  torsemide 20 mg oral tablet: 1 tab(s) orally once a day (at bedtime) (09-25-24)        LABS:                        9.8    17.57 )-----------( 209      ( 25 Sep 2024 09:10 )             29.2     09-25    137  |  102  |  117[H]  ----------------------------<  114[H]  4.9   |  16[L]  |  4.41[H]    Ca    7.4[L]      25 Sep 2024 12:20    TPro  6.6  /  Alb  2.5[L]  /  TBili  2.8[H]  /  DBili  x   /  AST  131[H]  /  ALT  73[H]  /  AlkPhos  187[H]  09-25        PT/INR - ( 25 Sep 2024 09:54 )   PT: 38.6 sec;   INR: 3.31 ratio         PTT - ( 25 Sep 2024 09:54 )  PTT:36.5 sec  Urinalysis Basic - ( 25 Sep 2024 12:20 )    Color: x / Appearance: x / SG: x / pH: x  Gluc: 114 mg/dL / Ketone: x  / Bili: x / Urobili: x   Blood: x / Protein: x / Nitrite: x   Leuk Esterase: x / RBC: x / WBC x   Sq Epi: x / Non Sq Epi: x / Bacteria: x          RADIOLOGY  CXR:      CT:    ECHO:      VITALS:  T(C): 35.4 (09-25-24 @ 09:02), Max: 36.7 (09-25-24 @ 08:45)  T(F): 95.7 (09-25-24 @ 09:02), Max: 98 (09-25-24 @ 08:45)  HR: 70 (09-25-24 @ 13:00) (70 - 100)  BP: 134/90 (09-25-24 @ 13:00) (92/79 - 134/90)  BP(mean): 101 (09-25-24 @ 13:00) (74 - 101)  RR: 20 (09-25-24 @ 13:00) (17 - 22)  SpO2: 98% (09-25-24 @ 13:00) (97% - 100%)        Height (cm): 175.3 (09-25-24 @ 08:45), 175.3 (09-23-24 @ 12:55)  Weight (kg): 90.7 (09-25-24 @ 08:45), 87.1 (09-23-24 @ 12:55)  BMI (kg/m2): 29.5 (09-25-24 @ 08:45), 28.3 (09-23-24 @ 12:55)        I&O's Detail      Physical Examination:  GENERAL:               Alert, confused, No acute distress.    HEENT:                    Pupils equal, reactive to light.  Symmetric DRY MM  PULM:                     Bilateral air entry, Clear to auscultation bilaterally, no significant sputum production, No Rales, No Rhonchi, No Wheezing  CVS:                         S1, S2,  No Murmur  ABD:                        Softly distended, nontender, normoactive bowel sounds,   EXT:                         No edema, nontender, No Cyanosis or Clubbing   Vascular:               Warm Extremities, chronic venous stasis  SKIN:                Pachyderma to BLEEs  NEURO:                  Alert, oriented to person and place, forgetful, nonfocal, follows commands

## 2024-09-25 NOTE — CONSULT NOTE ADULT - SUBJECTIVE AND OBJECTIVE BOX
DIANA CARVER  [id number]      HPI:  89-year-old male with PMHx HTN, CAD s/p CABG and PCI on Plavix, A fib on Xarelto, PPM, PVD, T2DM, essential tremor, hx prostate cancer, presenting rom assisted living with reported difficulty breathing, patient was reportedly given DuoNeb by staff, patient without reported fever or chills, denies any cough or congestion, denies any chest pain or shortness of breath, no reported abdominal pain.  No other reported complaints.  Was recently seen in the ED for fall.  as per chart review:  pt unable to provide history, confused, per daughter pts mental status has been declining sicne jun/24  when he was admitted for GI bleed.   more recently he had shingles 1 month back and was isolated at assisted living which further declined his functional staus, he is able to stand with help but moslty in w/c now.  he was noted to be SOB at assisted living and was sent to ED, per DTR pt always has baseline SOB due to h/o chf.  pt is DNR/DNI.      ALLERGIES:  No Known Allergies      PAST MEDICAL & SURGICAL HISTORY:  Pacemaker      CAD (coronary artery disease)      HTN (hypertension)      Diabetes      No significant past surgical history            CURRENT MEDICATIONS:  MEDICATIONS  (STANDING):  apixaban 2.5 milliGRAM(s) Oral two times a day  atorvastatin 40 milliGRAM(s) Oral at bedtime  clopidogrel Tablet 75 milliGRAM(s) Oral daily  dextrose 5% 1000 milliLiter(s) (50 mL/Hr) IV Continuous <Continuous>  dextrose 5%. 1000 milliLiter(s) (100 mL/Hr) IV Continuous <Continuous>  dextrose 5%. 1000 milliLiter(s) (50 mL/Hr) IV Continuous <Continuous>  dextrose 50% Injectable 25 Gram(s) IV Push once  dextrose 50% Injectable 12.5 Gram(s) IV Push once  dextrose 50% Injectable 25 Gram(s) IV Push once  doxazosin 2 milliGRAM(s) Oral daily  ferrous    sulfate 325 milliGRAM(s) Oral daily  glucagon  Injectable 1 milliGRAM(s) IntraMuscular once  insulin lispro (ADMELOG) corrective regimen sliding scale   SubCutaneous three times a day before meals  insulin lispro (ADMELOG) corrective regimen sliding scale   SubCutaneous at bedtime  latanoprost 0.005% Ophthalmic Solution 1 Drop(s) Both EYES at bedtime  metoprolol succinate ER 25 milliGRAM(s) Oral daily  multivitamin 1 Tablet(s) Oral daily  piperacillin/tazobactam IVPB.. 3.375 Gram(s) IV Intermittent every 12 hours    MEDICATIONS  (PRN):  acetaminophen     Tablet .. 650 milliGRAM(s) Oral every 6 hours PRN Temp greater or equal to 38C (100.4F), Mild Pain (1 - 3)  aluminum hydroxide/magnesium hydroxide/simethicone Suspension 30 milliLiter(s) Oral every 4 hours PRN Dyspepsia  dextrose Oral Gel 15 Gram(s) Oral once PRN Blood Glucose LESS THAN 70 milliGRAM(s)/deciliter  melatonin 3 milliGRAM(s) Oral at bedtime PRN Insomnia  ondansetron Injectable 4 milliGRAM(s) IV Push every 8 hours PRN Nausea and/or Vomiting      SOCIAL HISTORY:  unable to obtain    FAMILY HISTORY:  unable to obtain    ROS:  All 10 systems reviewed and positives noted in HPI    OBJECTIVE:    VITAL SIGNS:  Vital Signs Last 24 Hrs  T(C): 35.4 (25 Sep 2024 09:02), Max: 36.7 (25 Sep 2024 08:45)  T(F): 95.7 (25 Sep 2024 09:02), Max: 98 (25 Sep 2024 08:45)  HR: 70 (25 Sep 2024 13:00) (70 - 100)  BP: 134/90 (25 Sep 2024 13:00) (92/79 - 134/90)  BP(mean): 101 (25 Sep 2024 13:00) (74 - 101)  RR: 20 (25 Sep 2024 13:00) (17 - 22)  SpO2: 98% (25 Sep 2024 13:00) (97% - 100%)    Parameters below as of 25 Sep 2024 13:00  Patient On (Oxygen Delivery Method): room air        PHYSICAL EXAM:  General: restless  HEENT: sclera anicteric  Neck: supple, no carotid bruits b/l  CVS: JVP ~ 7 cm H20, RRR, s1, s2, no murmurs/rubs/gallops  Chest: unlabored respirations, clear to auscultation b/l  Abdomen: non-distended  Extremities: no lower extremity edema b/l  Neuro: awake, alert & oriented x 3  Psych: normal affect      LABS:                        9.8    17.57 )-----------( 209      ( 25 Sep 2024 09:10 )             29.2     09-25    137  |  102  |  117[H]  ----------------------------<  114[H]  4.9   |  16[L]  |  4.41[H]    Ca    7.4[L]      25 Sep 2024 12:20    TPro  6.6  /  Alb  2.5[L]  /  TBili  2.8[H]  /  DBili  x   /  AST  131[H]  /  ALT  73[H]  /  AlkPhos  187[H]  09-25        PT/INR - ( 25 Sep 2024 09:54 )   PT: 38.6 sec;   INR: 3.31 ratio         PTT - ( 25 Sep 2024 09:54 )  PTT:36.5 sec      ECG: Vpaced      TTE:     DIANA CARVER  [id number]      HPI:  89-year-old male with PMHx HTN, CAD s/p CABG and PCI on Plavix, A fib on Xarelto, PPM, PVD, T2DM, essential tremor, hx prostate cancer, presenting rom assisted living with reported difficulty breathing, patient was reportedly given DuoNeb by staff, patient without reported fever or chills, denies any cough or congestion, denies any chest pain or shortness of breath, no reported abdominal pain.  No other reported complaints.  Was recently seen in the ED for fall.  as per chart review:  pt unable to provide history, confused, per daughter pts mental status has been declining sicne jun/24  when he was admitted for GI bleed.   more recently he had shingles 1 month back and was isolated at assisted living which further declined his functional staus, he is able to stand with help but moslty in w/c now.  he was noted to be SOB at assisted living and was sent to ED, per DTR pt always has baseline SOB due to h/o chf.  pt is DNR/DNI.      ALLERGIES:  No Known Allergies      PAST MEDICAL & SURGICAL HISTORY:  Pacemaker      CAD (coronary artery disease)      HTN (hypertension)      Diabetes      No significant past surgical history            CURRENT MEDICATIONS:  MEDICATIONS  (STANDING):  apixaban 2.5 milliGRAM(s) Oral two times a day  atorvastatin 40 milliGRAM(s) Oral at bedtime  clopidogrel Tablet 75 milliGRAM(s) Oral daily  dextrose 5% 1000 milliLiter(s) (50 mL/Hr) IV Continuous <Continuous>  dextrose 5%. 1000 milliLiter(s) (100 mL/Hr) IV Continuous <Continuous>  dextrose 5%. 1000 milliLiter(s) (50 mL/Hr) IV Continuous <Continuous>  dextrose 50% Injectable 25 Gram(s) IV Push once  dextrose 50% Injectable 12.5 Gram(s) IV Push once  dextrose 50% Injectable 25 Gram(s) IV Push once  doxazosin 2 milliGRAM(s) Oral daily  ferrous    sulfate 325 milliGRAM(s) Oral daily  glucagon  Injectable 1 milliGRAM(s) IntraMuscular once  insulin lispro (ADMELOG) corrective regimen sliding scale   SubCutaneous three times a day before meals  insulin lispro (ADMELOG) corrective regimen sliding scale   SubCutaneous at bedtime  latanoprost 0.005% Ophthalmic Solution 1 Drop(s) Both EYES at bedtime  metoprolol succinate ER 25 milliGRAM(s) Oral daily  multivitamin 1 Tablet(s) Oral daily  piperacillin/tazobactam IVPB.. 3.375 Gram(s) IV Intermittent every 12 hours    MEDICATIONS  (PRN):  acetaminophen     Tablet .. 650 milliGRAM(s) Oral every 6 hours PRN Temp greater or equal to 38C (100.4F), Mild Pain (1 - 3)  aluminum hydroxide/magnesium hydroxide/simethicone Suspension 30 milliLiter(s) Oral every 4 hours PRN Dyspepsia  dextrose Oral Gel 15 Gram(s) Oral once PRN Blood Glucose LESS THAN 70 milliGRAM(s)/deciliter  melatonin 3 milliGRAM(s) Oral at bedtime PRN Insomnia  ondansetron Injectable 4 milliGRAM(s) IV Push every 8 hours PRN Nausea and/or Vomiting      SOCIAL HISTORY:  unable to obtain    FAMILY HISTORY:  unable to obtain    ROS:  All 10 systems reviewed and positives noted in HPI    OBJECTIVE:    VITAL SIGNS:  Vital Signs Last 24 Hrs  T(C): 35.4 (25 Sep 2024 09:02), Max: 36.7 (25 Sep 2024 08:45)  T(F): 95.7 (25 Sep 2024 09:02), Max: 98 (25 Sep 2024 08:45)  HR: 70 (25 Sep 2024 13:00) (70 - 100)  BP: 134/90 (25 Sep 2024 13:00) (92/79 - 134/90)  BP(mean): 101 (25 Sep 2024 13:00) (74 - 101)  RR: 20 (25 Sep 2024 13:00) (17 - 22)  SpO2: 98% (25 Sep 2024 13:00) (97% - 100%)    Parameters below as of 25 Sep 2024 13:00  Patient On (Oxygen Delivery Method): room air        PHYSICAL EXAM:  General: restless  HEENT: sclera anicteric  Neck: supple, no carotid bruits b/l  CVS: JVP ~ 7 cm H20, RRR, s1, s2, no murmurs/rubs/gallops  Chest: unlabored respirations, clear to auscultation anteriorly  Abdomen: non-distended  Extremities: no lower extremity edema b/l  Neuro: awake      LABS:                        9.8    17.57 )-----------( 209      ( 25 Sep 2024 09:10 )             29.2     09-25    137  |  102  |  117[H]  ----------------------------<  114[H]  4.9   |  16[L]  |  4.41[H]    Ca    7.4[L]      25 Sep 2024 12:20    TPro  6.6  /  Alb  2.5[L]  /  TBili  2.8[H]  /  DBili  x   /  AST  131[H]  /  ALT  73[H]  /  AlkPhos  187[H]  09-25        PT/INR - ( 25 Sep 2024 09:54 )   PT: 38.6 sec;   INR: 3.31 ratio         PTT - ( 25 Sep 2024 09:54 )  PTT:36.5 sec      ECG: Vpaced      TTE: < from: TTE Echo Complete w/o Contrast w/ Doppler (06.03.24 @ 09:42) >   1. Technically difficult study with poor endocardial visualization.   2. The left ventricle endocardium is not well visualized, appears to   have moderately reduced LV systolic function in limited views, estimated   visual LVEF    40%. Abnormal septal motion likely dueto paced rhythm. Consider use of   IV ultrasonic enhancing agent to better evaluate endocardium and for more   accurate assessment of LVEF, if clinically indicated.   3. Mildly increased LV wall thickness.   4. Normal left ventricular internal cavitysize.   5. The right ventricle is not well visualized, appears dilated.   6. Mild mitral valve regurgitation.   7. Mild tricuspid regurgitation.   8. There is a bioprosthetic valve in the aortic postion, appears well   seated with peak velocity within normal limits.   9. The pericardium was not well visualized.  10. Subcostal window not well visualized.         DIANA DAVILA  [id number]      HPI:    Diana Davila is a 89 year old man with past medical history of Coronary artery disease (s/p CABG & PCIs), HFrEF (LVEF 30%), Atrial fibrillation (on Eliquis), TAVR, Permanent pacemaker, Hypertension, Chronic kidney disease, Prostate cancer (s/p surgery) was brought in by EMS due to respiratory distress.      ALLERGIES:  No Known Allergies      CURRENT MEDICATIONS:  MEDICATIONS  (STANDING):  apixaban 2.5 milliGRAM(s) Oral two times a day  atorvastatin 40 milliGRAM(s) Oral at bedtime  clopidogrel Tablet 75 milliGRAM(s) Oral daily  dextrose 5% 1000 milliLiter(s) (50 mL/Hr) IV Continuous <Continuous>  dextrose 5%. 1000 milliLiter(s) (100 mL/Hr) IV Continuous <Continuous>  dextrose 5%. 1000 milliLiter(s) (50 mL/Hr) IV Continuous <Continuous>  dextrose 50% Injectable 25 Gram(s) IV Push once  dextrose 50% Injectable 12.5 Gram(s) IV Push once  dextrose 50% Injectable 25 Gram(s) IV Push once  doxazosin 2 milliGRAM(s) Oral daily  ferrous    sulfate 325 milliGRAM(s) Oral daily  glucagon  Injectable 1 milliGRAM(s) IntraMuscular once  insulin lispro (ADMELOG) corrective regimen sliding scale   SubCutaneous three times a day before meals  insulin lispro (ADMELOG) corrective regimen sliding scale   SubCutaneous at bedtime  latanoprost 0.005% Ophthalmic Solution 1 Drop(s) Both EYES at bedtime  metoprolol succinate ER 25 milliGRAM(s) Oral daily  multivitamin 1 Tablet(s) Oral daily  piperacillin/tazobactam IVPB.. 3.375 Gram(s) IV Intermittent every 12 hours  ROS:  All 10 systems reviewed and positives noted in HPI    OBJECTIVE:    VITAL SIGNS:  Vital Signs Last 24 Hrs  T(C): 35.4 (25 Sep 2024 09:02), Max: 36.7 (25 Sep 2024 08:45)  T(F): 95.7 (25 Sep 2024 09:02), Max: 98 (25 Sep 2024 08:45)  HR: 70 (25 Sep 2024 13:00) (70 - 100)  BP: 134/90 (25 Sep 2024 13:00) (92/79 - 134/90)  BP(mean): 101 (25 Sep 2024 13:00) (74 - 101)  RR: 20 (25 Sep 2024 13:00) (17 - 22)  SpO2: 98% (25 Sep 2024 13:00) (97% - 100%)    Parameters below as of 25 Sep 2024 13:00  Patient On (Oxygen Delivery Method): room air        PHYSICAL EXAM:  General: restless  HEENT: sclera anicteric  Neck: supple  CVS: JVP ~ 7 cm H20, irregular, s1, s2  Chest: unlabored respirations, clear to auscultation anteriorly  Abdomen: non-distended  Extremities: no lower extremity edema b/l  Neuro: awake      LABS:                        9.8    17.57 )-----------( 209      ( 25 Sep 2024 09:10 )             29.2     09-25    137  |  102  |  117[H]  ----------------------------<  114[H]  4.9   |  16[L]  |  4.41[H]    Ca    7.4[L]      25 Sep 2024 12:20    TPro  6.6  /  Alb  2.5[L]  /  TBili  2.8[H]  /  DBili  x   /  AST  131[H]  /  ALT  73[H]  /  AlkPhos  187[H]  09-25        PT/INR - ( 25 Sep 2024 09:54 )   PT: 38.6 sec;   INR: 3.31 ratio         PTT - ( 25 Sep 2024 09:54 )  PTT:36.5 sec      ECG: Vpaced      TTE: < from: TTE Echo Complete w/o Contrast w/ Doppler (06.03.24 @ 09:42) >   1. Technically difficult study with poor endocardial visualization.   2. The left ventricle endocardium is not well visualized, appears to   have moderately reduced LV systolic function in limited views, estimated   visual LVEF  40%. Abnormal septal motion likely dueto paced rhythm. Consider use of IV ultrasonic enhancing agent to better evaluate endocardium and for more accurate assessment of LVEF, if clinically indicated.   3. Mildly increased LV wall thickness.   4. Normal left ventricular internal cavitysize.   5. The right ventricle is not well visualized, appears dilated.   6. Mild mitral valve regurgitation.   7. Mild tricuspid regurgitation.   8. There is a bioprosthetic valve in the aortic postion, appears well   seated with peak velocity within normal limits.   9. The pericardium was not well visualized.  10. Subcostal window not well visualized.

## 2024-09-25 NOTE — CONSULT NOTE ADULT - NS PANP COMMENT GEN_ALL_CORE FT
pt seen and examiend in ed with ACP  case d/w team  Continue IVF  IV antibiotics  Trend labs  no acute intervention for ICU at this time.

## 2024-09-25 NOTE — ED PROVIDER NOTE - OBJECTIVE STATEMENT
89-year-old male with history of hypertension, DM, CAD, pacemaker presenting to the ED via EMS from assisted living with reported difficulty breathing, patient was reportedly given DuoNeb by staff, patient without reported fever or chills, denies any cough or congestion, denies any chest pain or shortness of breath, no reported abdominal pain.  No other reported complaints.  Was recently seen in the ED for fall.

## 2024-09-27 LAB
-  AMPICILLIN: SIGNIFICANT CHANGE UP
-  GENTAMICIN SYNERGY: SIGNIFICANT CHANGE UP
-  STREPTOMYCIN SYNERGY: SIGNIFICANT CHANGE UP
-  VANCOMYCIN: SIGNIFICANT CHANGE UP
CULTURE RESULTS: ABNORMAL
CULTURE RESULTS: ABNORMAL
METHOD TYPE: SIGNIFICANT CHANGE UP
ORGANISM # SPEC MICROSCOPIC CNT: ABNORMAL
ORGANISM # SPEC MICROSCOPIC CNT: ABNORMAL
ORGANISM # SPEC MICROSCOPIC CNT: SIGNIFICANT CHANGE UP
SPECIMEN SOURCE: SIGNIFICANT CHANGE UP
SPECIMEN SOURCE: SIGNIFICANT CHANGE UP

## 2024-10-15 PROCEDURE — 80048 BASIC METABOLIC PNL TOTAL CA: CPT

## 2024-10-15 PROCEDURE — 96375 TX/PRO/DX INJ NEW DRUG ADDON: CPT

## 2024-10-15 PROCEDURE — 87040 BLOOD CULTURE FOR BACTERIA: CPT

## 2024-10-15 PROCEDURE — 82962 GLUCOSE BLOOD TEST: CPT

## 2024-10-15 PROCEDURE — 87150 DNA/RNA AMPLIFIED PROBE: CPT

## 2024-10-15 PROCEDURE — 36415 COLL VENOUS BLD VENIPUNCTURE: CPT

## 2024-10-15 PROCEDURE — 71045 X-RAY EXAM CHEST 1 VIEW: CPT

## 2024-10-15 PROCEDURE — 96365 THER/PROPH/DIAG IV INF INIT: CPT

## 2024-10-15 PROCEDURE — 85730 THROMBOPLASTIN TIME PARTIAL: CPT

## 2024-10-15 PROCEDURE — 71250 CT THORAX DX C-: CPT | Mod: MC

## 2024-10-15 PROCEDURE — 74176 CT ABD & PELVIS W/O CONTRAST: CPT | Mod: MC

## 2024-10-15 PROCEDURE — 85025 COMPLETE CBC W/AUTO DIFF WBC: CPT

## 2024-10-15 PROCEDURE — 80053 COMPREHEN METABOLIC PANEL: CPT

## 2024-10-15 PROCEDURE — 83605 ASSAY OF LACTIC ACID: CPT

## 2024-10-15 PROCEDURE — 76705 ECHO EXAM OF ABDOMEN: CPT

## 2024-10-15 PROCEDURE — 93005 ELECTROCARDIOGRAM TRACING: CPT

## 2024-10-15 PROCEDURE — 85610 PROTHROMBIN TIME: CPT

## 2024-10-15 PROCEDURE — 93306 TTE W/DOPPLER COMPLETE: CPT

## 2024-10-15 PROCEDURE — 87637 SARSCOV2&INF A&B&RSV AMP PRB: CPT

## 2024-10-15 PROCEDURE — 87077 CULTURE AEROBIC IDENTIFY: CPT

## 2024-10-15 PROCEDURE — 96368 THER/DIAG CONCURRENT INF: CPT

## 2024-10-15 PROCEDURE — 99285 EMERGENCY DEPT VISIT HI MDM: CPT | Mod: 25

## 2024-10-15 PROCEDURE — 87186 SC STD MICRODIL/AGAR DIL: CPT

## 2024-11-18 NOTE — ED PROVIDER NOTE - EYES, MLM
[facility] Medicine  Progress Note    Patient Name: Stanley Granados  MRN: [id number]  Patient Class: IP- Inpatient   Admission Date: 11/17/2024  Length of Stay: 0 days  Attending Physician: Kiara Peraza*  Primary Care Provider: Phuong Umaña MD        Subjective:     Principal Problem:<principal problem not specified>      HPI:  82 y.o. male with a past medical history of Arthritis, Hypertension, Renal disorder, and Stroke transferred from [facility] for .   left-sided chest pain that started this morning accompanied by cough and shortness of breath x 2 days duration. CP is non radiating. He states he ran out of his blood pressure pills and has not been taking them. BP was 201/85 on presentation.  After arriving here from [facility], he had fever up to 102.1. No sick contacts.   He does have left-sided residual deficits from a previous stroke.  No leg swelling.  No nausea vomiting or diarrhea.  Trop x 1 - neg.  CXR showed bibasilar airspace disease.    Overview/Hospital Course:  No notes on file    Interval History:  Awake alert, sitting up in bed and eating  Updated patient and daughter by bedside  Deescalate IV Lasix dosage   Replace K  TTE pending      Review of Systems   Constitutional:  Positive for fatigue. Negative for fever.   HENT:  Negative for congestion.    Respiratory:  Negative for cough, shortness of breath and wheezing.    Cardiovascular:  Negative for chest pain, palpitations and leg swelling.     Objective:     Vital Signs (Most Recent):  Temp: 99.1 °F (37.3 °C) (11/18/24 0731)  Pulse: 79 (11/18/24 0731)  Resp: 18 (11/18/24 0731)  BP: (!) 144/66 (11/18/24 0731)  SpO2: 95 % (11/18/24 0731) Vital Signs (24h Range):  Temp:  [97.7 °F (36.5 °C)-101.3 °F (38.5 °C)] 99.1 °F (37.3 °C)  Pulse:  [62-86] 79  Resp:  [17-28] 18  SpO2:  [93 %-100 %] 95 %  BP: (121-240)/() 144/66     Weight: 69.1 kg (152 lb 5.4 oz)  Body mass index is 21.86 kg/m².    Intake/Output Summary (Last 24 hours) at  "11/18/2024 0822  Last data filed at 11/18/2024 0525  Gross per 24 hour   Intake --   Output 1400 ml   Net -1400 ml         Physical Exam  Constitutional:       Appearance: Normal appearance.   HENT:      Head: Normocephalic and atraumatic.   Cardiovascular:      Rate and Rhythm: Normal rate and regular rhythm.      Pulses: Normal pulses.      Heart sounds: Normal heart sounds. No murmur heard.     No friction rub. No gallop.   Pulmonary:      Effort: Pulmonary effort is normal. No respiratory distress (But decreased at the bases).      Breath sounds: Normal breath sounds.   Abdominal:      General: Abdomen is flat. Bowel sounds are normal.      Palpations: Abdomen is soft. There is no mass.      Tenderness: There is no abdominal tenderness.      Hernia: No hernia is present.   Musculoskeletal:         General: Normal range of motion.      Cervical back: Normal range of motion and neck supple.   Skin:     General: Skin is warm and dry.   Neurological:      General: No focal deficit present.      Mental Status: He is alert and oriented to person, place, and time. Mental status is at baseline.             Significant Labs: A1C: No results for input(s): "HGBA1C" in the last 4320 hours.  ABGs: No results for input(s): "PH", "PCO2", "HCO3", "POCSATURATED", "BE", "TOTALHB", "COHB", "METHB", "O2HB", "POCFIO2", "PO2" in the last 48 hours.  Blood Culture:   Recent Labs   Lab 11/17/24 1957   LABBLOO No Growth to date     CBC:   Recent Labs   Lab 11/17/24  0944   WBC 5.97   HGB 8.6*   HCT 26.7*        CMP:   Recent Labs   Lab 11/17/24  0944 11/18/24  0259    143   K 2.9* 2.5*    105   CO2 29 26   * 94   BUN 15 16   CREATININE 0.85 1.2   CALCIUM 8.0* 8.1*   PROT 6.8  --    ALBUMIN 3.4*  --    BILITOT 0.5  --    ALKPHOS 60  --    AST 22  --    ALT 14  --    ANIONGAP 7* 12     Lactic Acid: No results for input(s): "LACTATE" in the last 48 hours.  Lipase: No results for input(s): "LIPASE" in the last 48 " "hours.  Lipid Panel: No results for input(s): "CHOL", "HDL", "LDLCALC", "TRIG", "CHOLHDL" in the last 48 hours.  Magnesium:   Recent Labs   Lab 11/17/24  0944 11/18/24  0259   MG 1.7 1.5*     Troponin:   Recent Labs   Lab 11/17/24  1227 11/17/24 1958 11/18/24  0008   TROPONINI 0.019 0.043* 0.054*     TSH: No results for input(s): "TSH" in the last 4320 hours.  Urine Culture: No results for input(s): "LABURIN" in the last 48 hours.  Urine Studies:   Recent Labs   Lab 11/17/24  1823   COLORU Colorless*   APPEARANCEUA Clear   PHUR 7.0   SPECGRAV 1.015   PROTEINUA Negative   GLUCUA Negative   KETONESU Negative   BILIRUBINUA Negative   OCCULTUA Negative   NITRITE Negative   UROBILINOGEN Negative   LEUKOCYTESUR Negative       Significant Imaging: I have reviewed all pertinent imaging results/findings within the past 24 hours.    Assessment/Plan:      Pneumonia of both lungs due to infectious organism  Patient has a diagnosis of pneumonia. The cause of the pneumonia is unknown at this time. The pneumonia is stable. The patient has the following signs/symptoms of pneumonia: cough and shortness of breath. The patient does not have a current oxygen requirement and the patient does not have a home oxygen requirement. I have reviewed the pertinent imaging. The following cultures have been collected:  Blood  The culture results are listed below.     Current antimicrobial regimen consists of the antibiotics listed below. Will monitor patient closely and     Antibiotics (From admission, onward)      Start     Stop Route Frequency Ordered    11/18/24 0900  azithromycin tablet 500 mg         11/21/24 0859 Oral Daily 11/17/24 1812 11/17/24 1915  cefTRIAXone injection 1 g         -- IV Every 24 hours (non-standard times) 11/17/24 1812            Microbiology Results (last 7 days)       Procedure Component Value Units Date/Time    Blood culture [[id number]] Collected: 11/17/24 1957    Order Status: Completed Specimen: Blood from " "Peripheral, Hand, Left Updated: 11/18/24 0745     Blood Culture, Routine No Growth to date            CXR: Findings suggestive mild interstitial edema.      CT/ abd   Dependent pleural effusions and bibasilar airspace opacity/atelectasis     Hypokalemia  Patient's most recent potassium results are listed below.   Recent Labs     11/17/24  0944 11/18/24  0259   K 2.9* 2.5*       Plan  - Replete potassium per protocol  - Monitor potassium Daily  - Patient's hypokalemia is stable      Fever    Cause of fever is unknown.    We will send blood cultures x2   Sent COVID/flu/RSV testing  Start empiric antibiotics given concern for possible pneumonia as well    Congestive heart failure  Patient has  heart failure that is . On presentation their CHF was . Most recent BNP and echo results are listed below.  No results for input(s): "BNP" in the last 72 hours.  Latest ECHO  No results found for this or any previous visit.    Current Heart Failure Medications  carvediloL tablet 25 mg, 2 times daily, Oral  hydrALAZINE injection 10 mg, Every 4 hours PRN, Intravenous  furosemide injection 20 mg, Every 12 hours, Intravenous    Plan  - Monitor strict I&Os and daily weights.    - Place on telemetry  - Low sodium diet  - Place on fluid restriction of .   - Cardiology  consulted-appreciate recs  - The patient's volume status is   - TTE pending        Hypertension  Patients blood pressure range in the last 24 hours was: BP  Min: 121/60  Max: 240/111.The patient's inpatient anti-hypertensive regimen is listed below:  Current Antihypertensives  amLODIPine tablet 5 mg, Daily, Oral  carvediloL tablet 25 mg, 2 times daily, Oral  hydrALAZINE injection 10 mg, Every 4 hours PRN, Intravenous  furosemide injection 20 mg, Every 12 hours, Intravenous    Plan  - BP is uncontrolled, will adjust as follows:  Resume home blood pressure medicines  -  plans as above    Stage 3b chronic kidney disease  Creatine stable for now. BMP reviewed- noted Estimated " Creatinine Clearance: 46.4 mL/min (based on SCr of 1.2 mg/dL). according to latest data. Based on current GFR, CKD stage is stage 3 - GFR 30-59.  Monitor UOP and serial BMP and adjust therapy as needed. Renally dose meds. Avoid nephrotoxic medications and procedures.      VTE Risk Mitigation (From admission, onward)           Ordered     enoxaparin injection 40 mg  Daily         11/17/24 1812     IP VTE HIGH RISK PATIENT  Once         11/17/24 1812     Place sequential compression device  Until discontinued         11/17/24 1812                    Discharge Planning   VERONICA:      Code Status: Full Code   Is the patient medically ready for discharge?:     Reason for patient still in hospital (select all that apply): Patient trending condition  Discharge Plan A: Home with family                  Kiara Peraza MD  Department of [facility] Medicine   [facility]     Clear bilaterally, pupils equal, round and reactive to light.

## 2025-03-05 NOTE — ED PROVIDER NOTE - CCCP TRG CHIEF CMPLNT
Called patient to review need to hold Trulicity dose x 7 days prior to procedure on 3/12, pt stated awareness.   shortness of breath

## 2025-04-22 NOTE — OCCUPATIONAL THERAPY INITIAL EVALUATION ADULT - LEVEL OF INDEPENDENCE: SIT/STAND, REHAB EVAL
Problem: Occupational Therapy  Goal: Occupational Therapy Goal  Description: Goals to be met by: 5/15/25     Patient will increase functional independence with ADLs by performing:    UE Dressing with Modified [place].  LE Dressing with Modified [place].  Grooming while standing at sink with Modified [place].  Toileting from toilet with Modified [place] for hygiene and clothing management.   Toilet transfer to toilet with Modified [place].    Outcome: Progressing      minimum assist (75% patients effort)

## (undated) DEVICE — FORCEP RADIAL JAW 4 W NDL 2.4MM 2.8MM 240CM ORANGE DISP

## (undated) DEVICE — CONTAINER FORMALIN BUFF 10% 60ML

## (undated) DEVICE — SOL IRR POUR H2O 1000ML

## (undated) DEVICE — KIT ENDO PROCEDURE CUST W/VLV

## (undated) DEVICE — TUBING CAP SET ERBEFLO CLEVERCAP HYBRID CO2 FOR OLYMPUS SCOPES AND UCR